# Patient Record
Sex: FEMALE | Race: WHITE | Employment: OTHER | URBAN - METROPOLITAN AREA
[De-identification: names, ages, dates, MRNs, and addresses within clinical notes are randomized per-mention and may not be internally consistent; named-entity substitution may affect disease eponyms.]

---

## 2018-09-22 ENCOUNTER — HOSPITAL ENCOUNTER (EMERGENCY)
Facility: CLINIC | Age: 83
Discharge: HOME | End: 2018-09-22
Attending: EMERGENCY MEDICINE | Admitting: EMERGENCY MEDICINE
Payer: COMMERCIAL

## 2018-09-22 VITALS
RESPIRATION RATE: 16 BRPM | SYSTOLIC BLOOD PRESSURE: 141 MMHG | OXYGEN SATURATION: 95 % | TEMPERATURE: 97.6 F | DIASTOLIC BLOOD PRESSURE: 83 MMHG

## 2018-09-22 DIAGNOSIS — I10 ESSENTIAL HYPERTENSION: ICD-10-CM

## 2018-09-22 LAB
ANION GAP SERPL CALCULATED.3IONS-SCNC: 8 MMOL/L (ref 3–14)
BASOPHILS # BLD AUTO: 0.1 10E9/L (ref 0–0.2)
BASOPHILS NFR BLD AUTO: 0.6 %
BUN SERPL-MCNC: 18 MG/DL (ref 7–30)
CALCIUM SERPL-MCNC: 9.2 MG/DL (ref 8.5–10.1)
CHLORIDE SERPL-SCNC: 98 MMOL/L (ref 94–109)
CO2 SERPL-SCNC: 29 MMOL/L (ref 20–32)
CREAT SERPL-MCNC: 0.67 MG/DL (ref 0.52–1.04)
DIFFERENTIAL METHOD BLD: NORMAL
EOSINOPHIL # BLD AUTO: 0.2 10E9/L (ref 0–0.7)
EOSINOPHIL NFR BLD AUTO: 2.5 %
ERYTHROCYTE [DISTWIDTH] IN BLOOD BY AUTOMATED COUNT: 13.4 % (ref 10–15)
GFR SERPL CREATININE-BSD FRML MDRD: 81 ML/MIN/1.7M2
GLUCOSE SERPL-MCNC: 104 MG/DL (ref 70–99)
HCT VFR BLD AUTO: 39.6 % (ref 35–47)
HGB BLD-MCNC: 13.7 G/DL (ref 11.7–15.7)
IMM GRANULOCYTES # BLD: 0 10E9/L (ref 0–0.4)
IMM GRANULOCYTES NFR BLD: 0.2 %
INTERPRETATION ECG - MUSE: NORMAL
LYMPHOCYTES # BLD AUTO: 1.4 10E9/L (ref 0.8–5.3)
LYMPHOCYTES NFR BLD AUTO: 16.7 %
MCH RBC QN AUTO: 32.3 PG (ref 26.5–33)
MCHC RBC AUTO-ENTMCNC: 34.6 G/DL (ref 31.5–36.5)
MCV RBC AUTO: 93 FL (ref 78–100)
MONOCYTES # BLD AUTO: 0.9 10E9/L (ref 0–1.3)
MONOCYTES NFR BLD AUTO: 10.3 %
NEUTROPHILS # BLD AUTO: 6 10E9/L (ref 1.6–8.3)
NEUTROPHILS NFR BLD AUTO: 69.7 %
PLATELET # BLD AUTO: 296 10E9/L (ref 150–450)
POTASSIUM SERPL-SCNC: 3.9 MMOL/L (ref 3.4–5.3)
RBC # BLD AUTO: 4.24 10E12/L (ref 3.8–5.2)
SODIUM SERPL-SCNC: 135 MMOL/L (ref 133–144)
TROPONIN I SERPL-MCNC: <0.015 UG/L (ref 0–0.04)
WBC # BLD AUTO: 8.7 10E9/L (ref 4–11)

## 2018-09-22 PROCEDURE — 96374 THER/PROPH/DIAG INJ IV PUSH: CPT

## 2018-09-22 PROCEDURE — 93005 ELECTROCARDIOGRAM TRACING: CPT

## 2018-09-22 PROCEDURE — 85025 COMPLETE CBC W/AUTO DIFF WBC: CPT | Performed by: EMERGENCY MEDICINE

## 2018-09-22 PROCEDURE — 84484 ASSAY OF TROPONIN QUANT: CPT | Performed by: EMERGENCY MEDICINE

## 2018-09-22 PROCEDURE — 80048 BASIC METABOLIC PNL TOTAL CA: CPT | Performed by: EMERGENCY MEDICINE

## 2018-09-22 PROCEDURE — 25000128 H RX IP 250 OP 636: Performed by: EMERGENCY MEDICINE

## 2018-09-22 PROCEDURE — 99284 EMERGENCY DEPT VISIT MOD MDM: CPT | Mod: 25

## 2018-09-22 RX ORDER — HYDRALAZINE HYDROCHLORIDE 20 MG/ML
20 INJECTION INTRAMUSCULAR; INTRAVENOUS ONCE
Status: COMPLETED | OUTPATIENT
Start: 2018-09-22 | End: 2018-09-22

## 2018-09-22 RX ADMIN — HYDRALAZINE HYDROCHLORIDE 20 MG: 20 INJECTION INTRAMUSCULAR; INTRAVENOUS at 18:58

## 2018-09-22 ASSESSMENT — ENCOUNTER SYMPTOMS
LIGHT-HEADEDNESS: 1
SHORTNESS OF BREATH: 0

## 2018-09-22 NOTE — ED AVS SNAPSHOT
Emergency Department    [address] [place] [address]    Phone:  [phone]    Fax:  [phone]                                       Rhea Abad   MRN: [id number]    Department:   Emergency Department   Date of Visit:  9/22/2018           After Visit Summary Signature Page     I have received my discharge instructions, and my questions have been answered. I have discussed any challenges I see with this plan with the nurse or doctor.    ..........................................................................................................................................  Patient/Patient Representative Signature      ..........................................................................................................................................  Patient Representative Print Name and Relationship to Patient    ..................................................               ................................................  Date                                   Time    ..........................................................................................................................................  Reviewed by Signature/Title    ...................................................              ..............................................  Date                                               Time          22EPIC Rev 08/18

## 2018-09-22 NOTE — ED PROVIDER NOTES
History     Chief Complaint:  Hypertension       HPI   Rhea Abad is a 89 year old female who presents with her daughter to the Emergency Department for evaluation of hypertension. The patient reports that she measured her BP at home, with a systolic pressure greater than 200. Her usual BP is 161/76.  She is currently on a variety of BP medication. She also notes a few 30 second episodes of lightheadedness today but otherwise has been asx. She had a med dose change in Atenol from 100 mg to 200 mg within the past few months.  She denies any chest pain or shortness of breath      Allergies:  Lisinopril    Medications:    atenolol (TENORMIN) 200 MG tablet  BUSPIRONE HCL PO  calcium polycarbophil (FIBERCON) 625 MG tablet  Citalopram Hydrobromide (CELEXA PO)  dimenhyDRINATE (DRAMAMINE) 25 MG tablet  furosemide (LASIX) 20 MG tablet  levothyroxine (LEVOXYL) 100 MCG tablet  LOSARTAN POTASSIUM PO  pantoprazole (PROTONIX) 40 MG enteric coated tablet  senna-docusate (SENOKOT-S;PERICOLACE) 8.6-50 MG per tablet  TUMS E-X 750 MG OR CHEW     Past Medical History:    Chronic or unspecified peptic ulcer, unspecified site, with hemorrhage, without mention of obstruct  Essential hypertension, benign   GLUCOSE INTOLERANCE   Unspecified hypothyroidism   Hypertension   Depression   Prolapse Vaginal wall   Hypotension due to blood loss   Acute upper GI bleed     Past Surgical History:    EGD x2   Hernia repair   HC Blood transfusion   HC Upper GI W/O KUB  Left cataracts extraction   Left breast core needle biopsy     Family History:    DM  Cerebrovascular Disease  Lung Cancer   HTN     Social History:  Marital Status:    The patient was accompanied to the ED by her daughter.  Smoking Status: Never  Smokeless Tobacco: Never  Alcohol Use: Yes-occ. mixed drinks or wine     Review of Systems   Constitutional:        High BP   Respiratory: Negative for shortness of breath.    Cardiovascular: Negative for chest pain.    Neurological: Positive for light-headedness.   All other systems reviewed and are negative.    Physical Exam   First Vitals:  BP: (!) 229/87  Heart Rate: 64  Temp: 97.6  F (36.4  C)  Resp: 16  SpO2: 98 %      Physical Exam  General/Appearance: appears stated age, well-groomed, appears comfortable  Eyes: EOMI, no scleral injection, no icterus  ENT: MMM  Neck: supple, nl ROM, no stiffness  Cardiovascular: RRR, nl S1S2, no m/r/g, 2+ pulses in all 4 extremities, cap refill <2sec  Respiratory: CTAB, good air movement throughout, no wheezes/rhonchi/rales, no increased WOB, no retractions  Back: no lesions  GI: abd soft, non-distended, nttp,  no HSM, no rebound, no guarding, nl BS  MSK: WALDROP, good tone, no bony abnormality  Skin: warm and well-perfused, no rash, no edema, no ecchymosis, nl turgor  Neuro: GCS 15, alert and oriented, no gross focal neuro deficits  Psych: interacts appropriately  Heme: no petechia, no purpura, no active bleeding      Emergency Department Course     ECG:  Indication: Hypertension  Completed at 2016.  Read at 2016.   Sinus rhythm with marked sinus arrhythmia. Otherwise normal ECG    Rate 71 bpm. WY interval 156. QRS duration 86. QT/QTc 428/465. P-R-T axes 55 67 64.     Laboratory:  Laboratory findings were communicated with the patient who voiced understanding of the findings.    CBC: AWNL. (WBC 8.7, HGB 13.7, )    BMP: Glucose 104 (H) o/w WNL (Creatinine 0.67)   Troponin I: < 0.015    Interventions:  1858: Apresoline 20 mg IV     Emergency Department Course:  IV was inserted and blood was drawn for laboratory testing, results above.    Nursing notes and vitals reviewed.  1845: I performed an exam of the patient as documented above.     Findings and plan explained to the Patient. Patient discharged home with instructions regarding supportive care, medications, and reasons to return. The importance of close follow-up was reviewed.     Impression & Plan      Medical Decision Making:  This  patient is a 89-year-old female who presents with hypertension.  She had several episodes of lightheadedness today but otherwise denies vertigo, headache, chest pain, shortness of breath.  She feels well.  Labs, including troponin and creatinine were within normal limits.  EKG shows no evidence of strain.  After 20 of hydralazine her blood pressure improved markedly.  I think she is safe for discharge and of asked her to follow-up with her PCP.  Diagnosis:    ICD-10-CM    1. Essential hypertension I10        Disposition:  discharged to home    Gwen   9/22/2018    EMERGENCY DEPARTMENT      Scribe Disclosure:  I, Gwen Sanchez, am serving as a scribe at 6:45 PM on 9/22/2018 to document services personally performed by Selma Mccormick MD based on my observations and the provider's statements to me.        Selma Mccormick MD  09/22/18 [id number]

## 2018-09-22 NOTE — ED AVS SNAPSHOT
Emergency Department    [address] [facility] [address]    Phone:  [phone]    Fax:  [phone]                                       Rhea Abad   MRN: [id number]    Department:   Emergency Department   Date of Visit:  9/22/2018           Patient Information     Date Of Birth          10/28/1923        Your diagnoses for this visit were:     Essential hypertension        You were seen by Selma Mccormick MD.      Follow-up Information     Schedule an appointment as soon as possible for a visit with Yoan Saldana MD.    Specialty:  Internal Medicine    Contact information:    [facility]  [address] [address] [phone]          Follow up with  Emergency Department.    Specialty:  EMERGENCY MEDICINE    Why:  As needed, If symptoms worsen    Contact information:    [address] [facility] [id number] [phone]        Discharge Instructions         Uncontrolled High Blood Pressure (Established)    Your blood pressure was unusually high today. This can occur if you ve missed doses of your blood pressure medicine. Or it can happen if you are taking other medicines. These include some asthma inhalers, decongestants, diet pills, and street drugs like cocaine and amphetamine.  Other causes include:    Weight gain    More salt in your diet    Smoking    Caffeine  Your blood pressure can also rise if you are emotionally upset or in intense pain. It may go back to normal after a period of rest.  Blood pressure measurements are given as 2 numbers. Systolic blood pressure is the upper number. This is the pressure when the heart contracts. Diastolic blood pressure is the lower number. This is the pressure when the heart relaxes between beats. You will see your blood pressure readings written together. For example, a person with a systolic pressure of 118 and a diastolic pressure of 78 will have 118/78 written in the medical  record. To be high blood pressure, the numbers must be higher when tested over a period of time.  Blood pressure is categorized as normal, elevated, or stage 1 or stage 2 high blood pressure:    Normal blood pressure is systolic of less than 120 and diastolic of less than 80 (120/80)    Elevated blood pressure is systolic of 120 to 129 and diastolic less than 80    Stage 1 high blood pressure is systolic is 130 to 139 or diastolic between 80 to 89    Stage 2 high blood pressure is when systolic is 140 or higher or the diastolic is 90 or higher  Uncontrolled high blood pressure can cause serious health problems. It raises your risk for heart attack, stroke, and heart failure. In general, if you have high blood pressure, keeping your blood pressure below 130/80 mmHg may help prevent these problems. Your healthcare provider may prescribe medicine to help control blood pressure if lifestyle changes are not enough.  Home care  It s important to take steps to lower your blood pressure. If you are taking blood pressure medicine, the guidelines below may help you need less or no medicines in the future.    Start a weight-loss program if you are overweight.    Cut back on the amount of salt in your diet:  ? Avoid high-salt foods like olives, pickles, smoked meats, and salted potato chips.  ? Don t add salt to your food at the table.  ? Use only small amounts of salt when cooking.    Start an exercise program. Talk with your healthcare provider about what exercise program is best for you. It doesn t have to be difficult. Even brisk walking for 20 minutes 3 times a week is a good form of exercise.    Avoid medicines that stimulates the heart. This includes many over-the-counter cold and sinus decongestant pills and sprays, as well as diet pills. Check the warnings about high blood pressure on the label. Before purchasing any over-the-counter medicines or supplements, always ask the pharmacist about the product's potential  interaction with your high blood pressure and your medicines.    Stimulants such as amphetamine or cocaine could be lethal for someone with high blood pressure. Never take these.    Limit how much caffeine you drink. Or switch to noncaffeinated beverages.    Stop smoking. If you are a long-time smoker, this can be hard. Enroll in a stop-smoking program to make it more likely that you will succeed. Talk with your provider about ways to quit.    Learn how to handle stress better. This is an important part of any program to lower blood pressure. Learn ways to relax. These include meditation, yoga, and biofeedback.    If medicines were prescribed, take them exactly as directed. Missing doses may cause your blood pressure to get out of control.    If you miss a dose or doses of your medicines, check with your healthcare provider or pharmacist about what to do.    Consider buying an automatic blood pressure machine. Your provider may recommend a certain type. You can get one of these at most pharmacies. Measure your blood pressure twice a day, in the morning, and in the late afternoon. Keep a written record of your home blood pressure readings and take the record to your medical appointments.  Here are some additional guidelines on home blood pressure monitoring from the American Heart Association.    Don't smoke or drink coffee for 30 minutes    Go to the bathroom before the test.    Relax for 5 minutes before taking the measurement.    Sit correctly. Be sure your back is supported. Don't sit on a couch or soft chair. Uncross your feet and place them flat on the floor. Place your arm on a solid, flat surface like a table with the upper arm at heart level. Make certain the middle of the cuff is directly above the bend of the elbow. Check the monitor's instruction manual for an illustration.    Take multiple readings. When you measure, take 2 or 3 readings one minute apart and record all of the results.    Take your blood  pressure at the same time every day, or as your healthcare provider recommends.    Record the date, time, and blood pressure reading.    Take the record with you to your next appointment. If your blood pressure monitor has a built-in memory, simply take the monitor with you to your next appointment.    Call your provider if you have several high readings. Don't be frightened by a single high reading, but if you get several high readings, check in with your healthcare provider.    Note: When blood pressure reaches a systolic (top number) of 180 or higher or a diastolic (bottom number) of 110 or higher, emergency medical treatment is required. Call your healthcare provider immediately.  Follow-up care  Regular visits to your own healthcare provider for blood pressure and medicine checks are an important part of your care. Make a follow-up appointment as directed. Bring the record of your home blood pressure readings to the appointment.  When to seek medical advice  Call your healthcare provider right away if any of these occur:    Blood pressure reaches a systolic (top number) of 180 or higher or diastolic (bottom number) of 110 or higher, emergency medical treatment is required.    Chest, arm, shoulder, neck, or upper back pain    Shortness of breath    Severe headache    Throbbing or rushing sound in the ears    Nosebleed    Extreme drowsiness, confusion, or fainting    Dizziness or dizziness with spinning sensation (vertigo)    Weakness in an arm or leg or on one side of the face    Trouble speaking or seeing   Date Last Reviewed: 1/1/2017 2000-2017 [employer]. [address] [place] [address]. All rights reserved. This information is not intended as a substitute for professional medical care. Always follow your healthcare professional's instructions.          24 Hour Appointment Hotline       To make an appointment at any [facility], call [phone] ([phone]). If you  don't have a family doctor or clinic, we will help you find one. [facility] clinics are conveniently located to serve the needs of you and your family.             Review of your medicines      Our records show that you are taking the medicines listed below. If these are incorrect, please call your family doctor or clinic.        Dose / Directions Last dose taken    atenolol 100 MG tablet   Commonly known as:  TENORMIN   Dose:  100 mg   Quantity:  90 tablet        Take 1 tablet by mouth daily.   Refills:  3        BUSPIRONE HCL PO   Dose:  7.5 mg        Take 7.5 mg by mouth daily Prescribed TID   Refills:  0        CALCIUM 600 PO   Dose:  1 tablet        Take 1 tablet by mouth daily   Refills:  0        calcium polycarbophil 625 MG tablet   Commonly known as:  FIBERCON   Dose:  2 tablet        Take 2 tablets by mouth daily   Refills:  0        CELEXA PO   Dose:  20 mg        Take 20 mg by mouth daily   Refills:  0        dimenhyDRINATE 25 mg half-tab   Commonly known as:  DRAMAMINE   Dose:  50 mg        Take 50 mg by mouth At Bedtime   Refills:  0        ferrous sulfate 325 (65 Fe) MG tablet   Commonly known as:  IRON   Dose:  325 mg   Quantity:  60 tablet        Take 1 tablet (325 mg) by mouth 2 times daily   Refills:  1        furosemide 20 MG tablet   Commonly known as:  LASIX   Dose:  20 mg   Quantity:  90 tablet        Take 1 tablet (20 mg) by mouth daily Stop taking Lasix for now until you will be seen by your PMD later on this week; if needed- he may resume Lasix.   Refills:  0        levothyroxine 100 MCG tablet   Commonly known as:  LEVOXYL   Dose:  1 tablet   Quantity:  90 tablet        Take 1 tablet by mouth daily.   Refills:  3        LOSARTAN POTASSIUM PO   Dose:  100 mg        Take 100 mg by mouth daily   Refills:  0        MULTIVITAMIN TABS   OR        1 tablet daily   Refills:  0        pantoprazole 40 MG EC tablet   Commonly known as:  PROTONIX   Dose:  40 mg   Quantity:  30 tablet        Take 1  tablet (40 mg) by mouth daily   Refills:  1        senna-docusate 8.6-50 MG per tablet   Commonly known as:  SENOKOT-S;PERICOLACE   Dose:  1-2 tablet   Quantity:  100 tablet        Take 1-2 tablets by mouth 2 times daily as needed (constipation )   Refills:  0        TUMS E-X 750 MG Chew   Generic drug:  calcium carbonate        prn   Refills:  0        VITAMIN C PO   Dose:  500 mg        Take 500 mg by mouth daily   Refills:  0                Procedures and tests performed during your visit     Basic metabolic panel    CBC with platelets differential    EKG 12-lead, tracing only    Troponin I      Orders Needing Specimen Collection     None      Pending Results     No orders found from 9/20/2018 to 9/23/2018.            Pending Culture Results     No orders found from 9/20/2018 to 9/23/2018.            Pending Results Instructions     If you had any lab results that were not finalized at the time of your Discharge, you can call the ED Lab Result RN at [phone]. You will be contacted by this team for any positive Lab results or changes in treatment. The nurses are available 7 days a week from 10A to 6:30P.  You can leave a message 24 hours per day and they will return your call.        Test Results From Your Hospital Stay        9/22/2018  7:22 PM      Component Results     Component Value Ref Range & Units Status    WBC 8.7 4.0 - 11.0 10e9/L Final    RBC Count 4.24 3.8 - 5.2 10e12/L Final    Hemoglobin 13.7 11.7 - 15.7 g/dL Final    Hematocrit 39.6 35.0 - 47.0 % Final    MCV 93 78 - 100 fl Final    MCH 32.3 26.5 - 33.0 pg Final    MCHC 34.6 31.5 - 36.5 g/dL Final    RDW 13.4 10.0 - 15.0 % Final    Platelet Count 296 150 - 450 10e9/L Final    Diff Method Automated Method  Final    % Neutrophils 69.7 % Final    % Lymphocytes 16.7 % Final    % Monocytes 10.3 % Final    % Eosinophils 2.5 % Final    % Basophils 0.6 % Final    % Immature Granulocytes 0.2 % Final    Absolute Neutrophil 6.0 1.6 - 8.3 10e9/L Final     Absolute Lymphocytes 1.4 0.8 - 5.3 10e9/L Final    Absolute Monocytes 0.9 0.0 - 1.3 10e9/L Final    Absolute Eosinophils 0.2 0.0 - 0.7 10e9/L Final    Absolute Basophils 0.1 0.0 - 0.2 10e9/L Final    Abs Immature Granulocytes 0.0 0 - 0.4 10e9/L Final         9/22/2018  7:24 PM      Component Results     Component Value Ref Range & Units Status    Sodium 135 133 - 144 mmol/L Final    Potassium 3.9 3.4 - 5.3 mmol/L Final    Chloride 98 94 - 109 mmol/L Final    Carbon Dioxide 29 20 - 32 mmol/L Final    Anion Gap 8 3 - 14 mmol/L Final    Glucose 104 (H) 70 - 99 mg/dL Final    Urea Nitrogen 18 7 - 30 mg/dL Final    Creatinine 0.67 0.52 - 1.04 mg/dL Final    GFR Estimate 81 >60 mL/min/1.7m2 Final    Non  GFR Calc    GFR Estimate If Black >90 >60 mL/min/1.7m2 Final    African American GFR Calc    Calcium 9.2 8.5 - 10.1 mg/dL Final         9/22/2018  8:26 PM      Component Results     Component Value Ref Range & Units Status    Troponin I ES <0.015 0.000 - 0.045 ug/L Final    The 99th percentile for upper reference range is 0.045 ug/L.  Troponin values   in the range of 0.045 - 0.120 ug/L may be associated with risks of adverse   clinical events.                  Clinical Quality Measure: Blood Pressure Screening     Your blood pressure was checked while you were in the emergency department today. The last reading we obtained was  BP: 141/83 . Please read the guidelines below about what these numbers mean and what you should do about them.  If your systolic blood pressure (the top number) is less than 120 and your diastolic blood pressure (the bottom number) is less than 80, then your blood pressure is normal. There is nothing more that you need to do about it.  If your systolic blood pressure (the top number) is 120-139 or your diastolic blood pressure (the bottom number) is 80-89, your blood pressure may be higher than it should be. You should have your blood pressure rechecked within a year by a primary  "care provider.  If your systolic blood pressure (the top number) is 140 or greater or your diastolic blood pressure (the bottom number) is 90 or greater, you may have high blood pressure. High blood pressure is treatable, but if left untreated over time it can put you at risk for heart attack, stroke, or kidney failure. You should have your blood pressure rechecked by a primary care provider within the next 4 weeks.  If your provider in the emergency department today gave you specific instructions to follow-up with your doctor or provider even sooner than that, you should follow that instruction and not wait for up to 4 weeks for your follow-up visit.        Thank you for choosing [facility]       Thank you for choosing [facility] for your care. Our goal is always to provide you with excellent care. Hearing back from our patients is one way we can continue to improve our services. Please take a few minutes to complete the written survey that you may receive in the mail after you visit with us. Thank you!        [employer] Information     [employer] lets you send messages to your doctor, view your test results, renew your prescriptions, schedule appointments and more. To sign up, go to [email or web address] . Click on \"Log in\" on the left side of the screen, which will take you to the Welcome page. Then click on \"Sign up Now\" on the right side of the page.     You will be asked to enter the access code listed below, as well as some personal information. Please follow the directions to create your username and password.     Your access code is: [email or web address]  Expires: 2018  8:28 PM     Your access code will  in 90 days. If you need help or a new code, please call your [facility] clinic or [phone].        Care EveryWhere ID     This is your Care EveryWhere ID. This could be used by other organizations to access your [facility] medical records  [id number]        Equal Access to Services     AAKASH GAMA: Hadii " ivelisse Gupta, marcelino rizvi, renee khan. So [facility] [phone].    ATENCIÓN: Si habla español, tiene a jain disposición servicios gratuitos de asistencia lingüística. Llame al [phone].    We comply with applicable federal civil rights laws and [place] laws. We do not discriminate on the basis of race, color, national origin, age, disability, sex, sexual orientation, or gender identity.            After Visit Summary       This is your record. Keep this with you and show to your community pharmacist(s) and doctor(s) at your next visit.

## 2018-09-23 NOTE — DISCHARGE INSTRUCTIONS
Uncontrolled High Blood Pressure (Established)    Your blood pressure was unusually high today. This can occur if you ve missed doses of your blood pressure medicine. Or it can happen if you are taking other medicines. These include some asthma inhalers, decongestants, diet pills, and street drugs like cocaine and amphetamine.  Other causes include:    Weight gain    More salt in your diet    Smoking    Caffeine  Your blood pressure can also rise if you are emotionally upset or in intense pain. It may go back to normal after a period of rest.  Blood pressure measurements are given as 2 numbers. Systolic blood pressure is the upper number. This is the pressure when the heart contracts. Diastolic blood pressure is the lower number. This is the pressure when the heart relaxes between beats. You will see your blood pressure readings written together. For example, a person with a systolic pressure of 118 and a diastolic pressure of 78 will have 118/78 written in the medical record. To be high blood pressure, the numbers must be higher when tested over a period of time.  Blood pressure is categorized as normal, elevated, or stage 1 or stage 2 high blood pressure:    Normal blood pressure is systolic of less than 120 and diastolic of less than 80 (120/80)    Elevated blood pressure is systolic of 120 to 129 and diastolic less than 80    Stage 1 high blood pressure is systolic is 130 to 139 or diastolic between 80 to 89    Stage 2 high blood pressure is when systolic is 140 or higher or the diastolic is 90 or higher  Uncontrolled high blood pressure can cause serious health problems. It raises your risk for heart attack, stroke, and heart failure. In general, if you have high blood pressure, keeping your blood pressure below 130/80 mmHg may help prevent these problems. Your healthcare provider may prescribe medicine to help control blood pressure if lifestyle changes are not enough.  Home care  It s important to take  steps to lower your blood pressure. If you are taking blood pressure medicine, the guidelines below may help you need less or no medicines in the future.    Start a weight-loss program if you are overweight.    Cut back on the amount of salt in your diet:  ? Avoid high-salt foods like olives, pickles, smoked meats, and salted potato chips.  ? Don t add salt to your food at the table.  ? Use only small amounts of salt when cooking.    Start an exercise program. Talk with your healthcare provider about what exercise program is best for you. It doesn t have to be difficult. Even brisk walking for 20 minutes 3 times a week is a good form of exercise.    Avoid medicines that stimulates the heart. This includes many over-the-counter cold and sinus decongestant pills and sprays, as well as diet pills. Check the warnings about high blood pressure on the label. Before purchasing any over-the-counter medicines or supplements, always ask the pharmacist about the product's potential interaction with your high blood pressure and your medicines.    Stimulants such as amphetamine or cocaine could be lethal for someone with high blood pressure. Never take these.    Limit how much caffeine you drink. Or switch to noncaffeinated beverages.    Stop smoking. If you are a long-time smoker, this can be hard. Enroll in a stop-smoking program to make it more likely that you will succeed. Talk with your provider about ways to quit.    Learn how to handle stress better. This is an important part of any program to lower blood pressure. Learn ways to relax. These include meditation, yoga, and biofeedback.    If medicines were prescribed, take them exactly as directed. Missing doses may cause your blood pressure to get out of control.    If you miss a dose or doses of your medicines, check with your healthcare provider or pharmacist about what to do.    Consider buying an automatic blood pressure machine. Your provider may recommend a certain  type. You can get one of these at most pharmacies. Measure your blood pressure twice a day, in the morning, and in the late afternoon. Keep a written record of your home blood pressure readings and take the record to your medical appointments.  Here are some additional guidelines on home blood pressure monitoring from the American [employer] Association.    Don't smoke or drink coffee for 30 minutes    Go to the bathroom before the test.    Relax for 5 minutes before taking the measurement.    Sit correctly. Be sure your back is supported. Don't sit on a couch or soft chair. Uncross your feet and place them flat on the floor. Place your arm on a solid, flat surface like a table with the upper arm at heart level. Make certain the middle of the cuff is directly above the bend of the elbow. Check the monitor's instruction manual for an illustration.    Take multiple readings. When you measure, take 2 or 3 readings one minute apart and record all of the results.    Take your blood pressure at the same time every day, or as your healthcare provider recommends.    Record the date, time, and blood pressure reading.    Take the record with you to your next appointment. If your blood pressure monitor has a built-in memory, simply take the monitor with you to your next appointment.    Call your provider if you have several high readings. Don't be frightened by a single high reading, but if you get several high readings, check in with your healthcare provider.    Note: When blood pressure reaches a systolic (top number) of 180 or higher or a diastolic (bottom number) of 110 or higher, emergency medical treatment is required. Call your healthcare provider immediately.  Follow-up care  Regular visits to your own healthcare provider for blood pressure and medicine checks are an important part of your care. Make a follow-up appointment as directed. Bring the record of your home blood pressure readings to the appointment.  When to seek  medical advice  Call your healthcare provider right away if any of these occur:    Blood pressure reaches a systolic (top number) of 180 or higher or diastolic (bottom number) of 110 or higher, emergency medical treatment is required.    Chest, arm, shoulder, neck, or upper back pain    Shortness of breath    Severe headache    Throbbing or rushing sound in the ears    Nosebleed    Extreme drowsiness, confusion, or fainting    Dizziness or dizziness with spinning sensation (vertigo)    Weakness in an arm or leg or on one side of the face    Trouble speaking or seeing   Date Last Reviewed: 1/1/2017 2000-2017 [employer]. [address] [place] [address]. All rights reserved. This information is not intended as a substitute for professional medical care. Always follow your healthcare professional's instructions.

## 2018-09-25 ENCOUNTER — HOSPITAL ENCOUNTER (EMERGENCY)
Facility: CLINIC | Age: 83
Discharge: HOME | End: 2018-09-25
Attending: EMERGENCY MEDICINE | Admitting: EMERGENCY MEDICINE
Payer: COMMERCIAL

## 2018-09-25 VITALS
DIASTOLIC BLOOD PRESSURE: 93 MMHG | RESPIRATION RATE: 18 BRPM | SYSTOLIC BLOOD PRESSURE: 203 MMHG | TEMPERATURE: 97.8 F | HEIGHT: 65 IN | BODY MASS INDEX: 19.49 KG/M2 | WEIGHT: 117 LBS | OXYGEN SATURATION: 95 %

## 2018-09-25 DIAGNOSIS — I10 ESSENTIAL HYPERTENSION: ICD-10-CM

## 2018-09-25 LAB
ANION GAP SERPL CALCULATED.3IONS-SCNC: 7 MMOL/L (ref 3–14)
BASOPHILS # BLD AUTO: 0.1 10E9/L (ref 0–0.2)
BASOPHILS NFR BLD AUTO: 0.7 %
BUN SERPL-MCNC: 18 MG/DL (ref 7–30)
CALCIUM SERPL-MCNC: 9 MG/DL (ref 8.5–10.1)
CHLORIDE SERPL-SCNC: 101 MMOL/L (ref 94–109)
CO2 SERPL-SCNC: 28 MMOL/L (ref 20–32)
CREAT SERPL-MCNC: 0.64 MG/DL (ref 0.52–1.04)
DIFFERENTIAL METHOD BLD: NORMAL
EOSINOPHIL # BLD AUTO: 0.2 10E9/L (ref 0–0.7)
EOSINOPHIL NFR BLD AUTO: 2.1 %
ERYTHROCYTE [DISTWIDTH] IN BLOOD BY AUTOMATED COUNT: 13.1 % (ref 10–15)
GFR SERPL CREATININE-BSD FRML MDRD: 86 ML/MIN/1.7M2
GLUCOSE SERPL-MCNC: 91 MG/DL (ref 70–99)
HCT VFR BLD AUTO: 38.9 % (ref 35–47)
HGB BLD-MCNC: 13.4 G/DL (ref 11.7–15.7)
IMM GRANULOCYTES # BLD: 0 10E9/L (ref 0–0.4)
IMM GRANULOCYTES NFR BLD: 0.1 %
LYMPHOCYTES # BLD AUTO: 1.3 10E9/L (ref 0.8–5.3)
LYMPHOCYTES NFR BLD AUTO: 17.3 %
MCH RBC QN AUTO: 32.3 PG (ref 26.5–33)
MCHC RBC AUTO-ENTMCNC: 34.4 G/DL (ref 31.5–36.5)
MCV RBC AUTO: 94 FL (ref 78–100)
MONOCYTES # BLD AUTO: 0.8 10E9/L (ref 0–1.3)
MONOCYTES NFR BLD AUTO: 10.6 %
NEUTROPHILS # BLD AUTO: 5.2 10E9/L (ref 1.6–8.3)
NEUTROPHILS NFR BLD AUTO: 69.2 %
NRBC # BLD AUTO: 0 10*3/UL
NRBC BLD AUTO-RTO: 0 /100
PLATELET # BLD AUTO: 266 10E9/L (ref 150–450)
POTASSIUM SERPL-SCNC: 4 MMOL/L (ref 3.4–5.3)
RBC # BLD AUTO: 4.15 10E12/L (ref 3.8–5.2)
SODIUM SERPL-SCNC: 136 MMOL/L (ref 133–144)
TROPONIN I SERPL-MCNC: 0.04 UG/L (ref 0–0.04)
WBC # BLD AUTO: 7.6 10E9/L (ref 4–11)

## 2018-09-25 PROCEDURE — 80048 BASIC METABOLIC PNL TOTAL CA: CPT | Performed by: EMERGENCY MEDICINE

## 2018-09-25 PROCEDURE — 25000132 ZZH RX MED GY IP 250 OP 250 PS 637: Performed by: EMERGENCY MEDICINE

## 2018-09-25 PROCEDURE — 85025 COMPLETE CBC W/AUTO DIFF WBC: CPT | Performed by: EMERGENCY MEDICINE

## 2018-09-25 PROCEDURE — 99283 EMERGENCY DEPT VISIT LOW MDM: CPT

## 2018-09-25 PROCEDURE — 84484 ASSAY OF TROPONIN QUANT: CPT | Performed by: EMERGENCY MEDICINE

## 2018-09-25 RX ORDER — HYDRALAZINE HYDROCHLORIDE 25 MG/1
25 TABLET, FILM COATED ORAL ONCE
Status: COMPLETED | OUTPATIENT
Start: 2018-09-25 | End: 2018-09-25

## 2018-09-25 RX ORDER — HYDRALAZINE HYDROCHLORIDE 25 MG/1
25 TABLET, FILM COATED ORAL
Status: DISCONTINUED | OUTPATIENT
Start: 2018-09-25 | End: 2018-09-26 | Stop reason: HOSPADM

## 2018-09-25 RX ORDER — HYDRALAZINE HYDROCHLORIDE 25 MG/1
25 TABLET, FILM COATED ORAL 3 TIMES DAILY
Qty: 90 TABLET | Refills: 1 | Status: SHIPPED | OUTPATIENT
Start: 2018-09-25 | End: 2018-09-25

## 2018-09-25 RX ORDER — HYDRALAZINE HYDROCHLORIDE 25 MG/1
25 TABLET, FILM COATED ORAL 3 TIMES DAILY
Qty: 90 TABLET | Refills: 1 | Status: ON HOLD | OUTPATIENT
Start: 2018-09-25 | End: 2019-11-15

## 2018-09-25 RX ADMIN — HYDRALAZINE HYDROCHLORIDE 25 MG: 25 TABLET ORAL at 20:56

## 2018-09-25 ASSESSMENT — ENCOUNTER SYMPTOMS
CONSTIPATION: 0
DIARRHEA: 0
DIFFICULTY URINATING: 0
NAUSEA: 0
ABDOMINAL PAIN: 0
LIGHT-HEADEDNESS: 1
DYSURIA: 0
VOMITING: 0
HEADACHES: 0
FREQUENCY: 0

## 2018-09-25 NOTE — ED AVS SNAPSHOT
Emergency Department    [address] [facility] [address]    Phone:  [phone]    Fax:  [phone]                                       Rhea Abad   MRN: [id number]    Department:   Emergency Department   Date of Visit:  9/25/2018           Patient Information     Date Of Birth          10/28/1923        Your diagnoses for this visit were:     Essential hypertension        You were seen by Nazario Green MD.      Follow-up Information     Follow up with Yoan Saldana MD In 1 day.    Specialty:  Internal Medicine    Contact information:    [facility]  [address] [address]  [phone]          Follow up with  Emergency Department.    Specialty:  EMERGENCY MEDICINE    Why:  As needed    Contact information:    [address] [facility] [address] [phone]        Discharge Instructions       Discharge Instructions  Hypertension - High Blood Pressure    During you visit to the Emergency Department, your blood pressure was higher than the recommended blood pressure.  This may be related to stress, pain, medication or other temporary conditions. In these cases, your blood pressure may return to normal on its own. If you have a history of high blood pressure, you may need to have your provider adjust your medications. Sometimes, your high measurement here may indicate that you have developed high blood pressure that will stay high unless it is treated. As a general rule, high blood pressure causes problems over years rather than days, weeks, or months. So, while it is important to treat blood pressure, it is rarely important to treat blood pressure immediately. Occasionally we will begin a medication in the Emergency Department; more often we will recommend close follow-up for medications with a primary doctor/clinic.    Generally, every Emergency Department visit should have a follow-up clinic visit with either a primary or a  specialty clinic/provider. Please follow-up as instructed by your emergency provider today.    Return to the Emergency Department if you start to have:    A severe headache.    Chest pain.    Shortness of breath.    Weakness or numbness that affects one part of the body.    Confusion.    Vision changes.    Significant swelling of legs and/or eyes.    A reaction to any medication started in the Emergency Department.    What can I do to help myself?    Avoid alcohol.    Take any blood pressure medicine that you are prescribed.    Get a good night s sleep.    Lower your salt intake.    Exercise.    Lose weight.    Manage stress.    See your doctor regularly    If blood pressure medication was started in the Emergency Department:    The medicine may not have an immediate effect. The body and brain determine what blood pressure you have. The medicine s job is to retrain the body s  thermostat  to a lower blood pressure.    You will need to follow up with your provider to see how this medicine is working for you.  If you were given a prescription for medicine here today, be sure to read all of the information (including the package insert) that comes with your prescription.  This will include important information about the medicine, its side effects, and any warnings that you need to know about.  The pharmacist who fills the prescription can provide more information and answer questions you may have about the medicine.  If you have questions or concerns that the pharmacist cannot address, please call or return to the Emergency Department.   Remember that you can always come back to the Emergency Department if you are not able to see your regular provider in the amount of time listed above, if you get any new symptoms, or if there is anything that worries you.      24 Hour Appointment Hotline       To make an appointment at any [facility], call [phone] ([phone]). If you don't have a family doctor or  clinic, we will help you find one. [facility] are conveniently located to serve the needs of you and your family.             Review of your medicines      START taking        Dose / Directions Last dose taken    hydrALAZINE 25 MG tablet   Commonly known as:  APRESOLINE   Dose:  25 mg   Quantity:  90 tablet        Take 1 tablet (25 mg) by mouth 3 times daily   Refills:  1          Our records show that you are taking the medicines listed below. If these are incorrect, please call your family doctor or clinic.        Dose / Directions Last dose taken    atenolol 100 MG tablet   Commonly known as:  TENORMIN   Dose:  100 mg   Quantity:  90 tablet        Take 1 tablet by mouth daily.   Refills:  3        BUSPIRONE HCL PO   Dose:  7.5 mg        Take 7.5 mg by mouth daily Prescribed TID   Refills:  0        CALCIUM 600 PO   Dose:  1 tablet        Take 1 tablet by mouth daily   Refills:  0        calcium polycarbophil 625 MG tablet   Commonly known as:  FIBERCON   Dose:  2 tablet        Take 2 tablets by mouth daily   Refills:  0        CELEXA PO   Dose:  20 mg        Take 20 mg by mouth daily   Refills:  0        dimenhyDRINATE 25 mg half-tab   Commonly known as:  DRAMAMINE   Dose:  50 mg        Take 50 mg by mouth At Bedtime   Refills:  0        ferrous sulfate 325 (65 Fe) MG tablet   Commonly known as:  IRON   Dose:  325 mg   Quantity:  60 tablet        Take 1 tablet (325 mg) by mouth 2 times daily   Refills:  1        furosemide 20 MG tablet   Commonly known as:  LASIX   Dose:  20 mg   Quantity:  90 tablet        Take 1 tablet (20 mg) by mouth daily Stop taking Lasix for now until you will be seen by your PMD later on this week; if needed- he may resume Lasix.   Refills:  0        levothyroxine 100 MCG tablet   Commonly known as:  LEVOXYL   Dose:  1 tablet   Quantity:  90 tablet        Take 1 tablet by mouth daily.   Refills:  3        LOSARTAN POTASSIUM PO   Dose:  100 mg        Take 100 mg by mouth daily    Refills:  0        MULTIVITAMIN TABS   OR        1 tablet daily   Refills:  0        pantoprazole 40 MG EC tablet   Commonly known as:  PROTONIX   Dose:  40 mg   Quantity:  30 tablet        Take 1 tablet (40 mg) by mouth daily   Refills:  1        senna-docusate 8.6-50 MG per tablet   Commonly known as:  SENOKOT-S;PERICOLACE   Dose:  1-2 tablet   Quantity:  100 tablet        Take 1-2 tablets by mouth 2 times daily as needed (constipation )   Refills:  0        TUMS E-X 750 MG Chew   Generic drug:  calcium carbonate        prn   Refills:  0        VITAMIN C PO   Dose:  500 mg        Take 500 mg by mouth daily   Refills:  0                Prescriptions were sent or printed at these locations (1 Prescription)                   Other Prescriptions                Printed at Department/Unit printer (1 of 1)         hydrALAZINE (APRESOLINE) 25 MG tablet                Procedures and tests performed during your visit     Basic metabolic panel    CBC with platelets differential    Troponin I      Orders Needing Specimen Collection     None      Pending Results     No orders found from 9/23/2018 to 9/26/2018.            Pending Culture Results     No orders found from 9/23/2018 to 9/26/2018.            Pending Results Instructions     If you had any lab results that were not finalized at the time of your Discharge, you can call the ED Lab Result RN at [phone]. You will be contacted by this team for any positive Lab results or changes in treatment. The nurses are available 7 days a week from 10A to 6:30P.  You can leave a message 24 hours per day and they will return your call.        Test Results From Your Hospital Stay        9/25/2018  8:59 PM      Component Results     Component Value Ref Range & Units Status    Sodium 136 133 - 144 mmol/L Final    Potassium 4.0 3.4 - 5.3 mmol/L Final    Chloride 101 94 - 109 mmol/L Final    Carbon Dioxide 28 20 - 32 mmol/L Final    Anion Gap 7 3 - 14 mmol/L Final    Glucose 91 70 -  99 mg/dL Final    Urea Nitrogen 18 7 - 30 mg/dL Final    Creatinine 0.64 0.52 - 1.04 mg/dL Final    GFR Estimate 86 >60 mL/min/1.7m2 Final    Non  GFR Calc    GFR Estimate If Black >90 >60 mL/min/1.7m2 Final    African American GFR Calc    Calcium 9.0 8.5 - 10.1 mg/dL Final         9/25/2018  8:43 PM      Component Results     Component Value Ref Range & Units Status    WBC 7.6 4.0 - 11.0 10e9/L Final    RBC Count 4.15 3.8 - 5.2 10e12/L Final    Hemoglobin 13.4 11.7 - 15.7 g/dL Final    Hematocrit 38.9 35.0 - 47.0 % Final    MCV 94 78 - 100 fl Final    MCH 32.3 26.5 - 33.0 pg Final    MCHC 34.4 31.5 - 36.5 g/dL Final    RDW 13.1 10.0 - 15.0 % Final    Platelet Count 266 150 - 450 10e9/L Final    Diff Method Automated Method  Final    % Neutrophils 69.2 % Final    % Lymphocytes 17.3 % Final    % Monocytes 10.6 % Final    % Eosinophils 2.1 % Final    % Basophils 0.7 % Final    % Immature Granulocytes 0.1 % Final    Nucleated RBCs 0 0 /100 Final    Absolute Neutrophil 5.2 1.6 - 8.3 10e9/L Final    Absolute Lymphocytes 1.3 0.8 - 5.3 10e9/L Final    Absolute Monocytes 0.8 0.0 - 1.3 10e9/L Final    Absolute Eosinophils 0.2 0.0 - 0.7 10e9/L Final    Absolute Basophils 0.1 0.0 - 0.2 10e9/L Final    Abs Immature Granulocytes 0.0 0 - 0.4 10e9/L Final    Absolute Nucleated RBC 0.0  Final         9/25/2018  9:03 PM      Component Results     Component Value Ref Range & Units Status    Troponin I ES 0.045 0.000 - 0.045 ug/L Final    The 99th percentile for upper reference range is 0.045 ug/L.  Troponin values   in the range of 0.045 - 0.120 ug/L may be associated with risks of adverse   clinical events.                  Clinical Quality Measure: Blood Pressure Screening     Your blood pressure was checked while you were in the emergency department today. The last reading we obtained was  BP: (!) 186/94 . Please read the guidelines below about what these numbers mean and what you should do about them.  If your  "systolic blood pressure (the top number) is less than 120 and your diastolic blood pressure (the bottom number) is less than 80, then your blood pressure is normal. There is nothing more that you need to do about it.  If your systolic blood pressure (the top number) is 120-139 or your diastolic blood pressure (the bottom number) is 80-89, your blood pressure may be higher than it should be. You should have your blood pressure rechecked within a year by a primary care provider.  If your systolic blood pressure (the top number) is 140 or greater or your diastolic blood pressure (the bottom number) is 90 or greater, you may have high blood pressure. High blood pressure is treatable, but if left untreated over time it can put you at risk for heart attack, stroke, or kidney failure. You should have your blood pressure rechecked by a primary care provider within the next 4 weeks.  If your provider in the emergency department today gave you specific instructions to follow-up with your doctor or provider even sooner than that, you should follow that instruction and not wait for up to 4 weeks for your follow-up visit.        Thank you for choosing [facility]       Thank you for choosing [facility] for your care. Our goal is always to provide you with excellent care. Hearing back from our patients is one way we can continue to improve our services. Please take a few minutes to complete the written survey that you may receive in the mail after you visit with us. Thank you!        goOutMap Information     goOutMap lets you send messages to your doctor, view your test results, renew your prescriptions, schedule appointments and more. To sign up, go to [email or web address] . Click on \"Log in\" on the left side of the screen, which will take you to the Welcome page. Then click on \"Sign up Now\" on the right side of the page.     You will be asked to enter the access code listed below, as well as some personal information. Please " follow the directions to create your username and password.     Your access code is: [email or web address]  Expires: 2018  8:28 PM     Your access code will  in 90 days. If you need help or a new code, please call your [facility] clinic or [phone].        Care EveryWhere ID     This is your Care EveryWhere ID. This could be used by other organizations to access your [facility] medical records  [id number]        Equal Access to Services     [facility] CenterLEONID : Hadsofía loyao Sokirill, waaxda luqadaha, qaybta kaalmada adedelfino, renee reece. So [facility] [phone].    ATENCIÓN: Si habla español, tiene a jain disposición servicios gratuitos de asistencia lingüística. Llame al [phone].    We comply with applicable federal civil rights laws and [place] laws. We do not discriminate on the basis of race, color, national origin, age, disability, sex, sexual orientation, or gender identity.            After Visit Summary       This is your record. Keep this with you and show to your community pharmacist(s) and doctor(s) at your next visit.

## 2018-09-25 NOTE — ED AVS SNAPSHOT
Emergency Department    [address] [place] [address]    Phone:  [phone]    Fax:  [phone]                                       Rhea Abad   MRN: [id number]    Department:   Emergency Department   Date of Visit:  9/25/2018           After Visit Summary Signature Page     I have received my discharge instructions, and my questions have been answered. I have discussed any challenges I see with this plan with the nurse or doctor.    ..........................................................................................................................................  Patient/Patient Representative Signature      ..........................................................................................................................................  Patient Representative Print Name and Relationship to Patient    ..................................................               ................................................  Date                                   Time    ..........................................................................................................................................  Reviewed by Signature/Title    ...................................................              ..............................................  Date                                               Time          22EPIC Rev 08/18

## 2018-09-26 NOTE — ED PROVIDER NOTES
History     Chief Complaint:    Hypertension    HPI   Rhea Abad is a 89 year old with a history of hypertension female who presents with hypertension. The patient reports that on 9/22/18 she was seen in the emergency department for hypertension and eventually was discharged after her blood pressure came down with intervention. Today, the patient notes that her blood pressure has gone back up and she has been feeling intermittently lightheaded but is currently asymptomatic. She denies any chest pain, shortness of breath, headache, nausea, vomiting, changes in urination or bowel movements, or abdominal pain. The patient reports that she has not been able to see her primary doctor yesterday or today. She describes that she takes her blood pressure at home and at one point today the machine stopped and said error after a systolic reading of 215. Today, she notes she has had 40 mg of the hydralazine. Of note, the patient describes that her blood pressure will flare up occasionally in the 35 years of taking medications for hypertension.     Allergies:  Lisinopril     Medications:    Tenormin  Fibercon  Celexa  Dramamine  Lasix  Levoxyl  Losartan Potassium  Senokot-S;Pericolace  Hydralazine    Past Medical History:    Peptic ulcer  Hypertension  Glucose intolerance  Hypothyroidism   Constipation  Low back and right leg pain  prolapse of vaginal wall  Acute upper gastrointestinal bleeding    Past Surgical History:    C REMV cataract intracap  Breast biopsy  Esophagoscopy, gastroscopy, dueodenoscopy  HC blood tranfusion  HC Upper GI W/O CATALINA  Hernia repair    Family History:    Mother: diabetes  Father: Cerebrovascular disease  Brother: Cancer, hypertension  Sister: Hypertension  Maternal grandmother: breast cancer    Social History:  The patient was accompanied to the ED by daughter.  Smoking Status: Never Smoker  Smokeless Tobacco: Never Used  Alcohol Use: Yes  Marital Status:        Review of Systems  "  Cardiovascular: Negative for chest pain.   Gastrointestinal: Negative for abdominal pain, constipation, diarrhea, nausea and vomiting.   Genitourinary: Negative for decreased urine volume, difficulty urinating, dysuria, frequency and urgency.   Neurological: Positive for light-headedness. Negative for headaches.   All other systems reviewed and are negative.    Physical Exam     Patient Vitals for the past 24 hrs:   BP Temp Temp src Heart Rate Resp SpO2 Height Weight   09/25/18 2203 (!) 186/94 - - 59 - 96 % - -   09/25/18 2143 120/90 - - 57 - 95 % - -   09/25/18 2056 (!) 203/102 - - - - - - -   09/25/18 2054 (!) 203/102 - - - - - - -   09/25/18 1824 (!) 220/104 97.8  F (36.6  C) Oral 61 18 96 % 1.651 m (5' 5\") 53.1 kg (117 lb)     Physical Exam  Constitutional: Well developed, nontox appearance  Head: Atraumatic.   Mouth/Throat: Oropharynx is clear and moist.   Neck:  no stridor  Eyes: no scleral icterus  Cardiovascular: borderline bradycardia, 2+ bilat radial, DP pulses  Pulmonary/Chest: nml resp effort, Clear BS bilat  Abdominal: ND, +BS, soft, NT, no rebound or guarding   Ext: Warm, well perfused, no edema  Neurological: A&O, symmetric facies, moves ext x4  Skin: Skin is warm and dry.   Psychiatric: Behavior is normal. Thought content normal.   Nursing note and vitals reviewed.        Emergency Department Course   Laboratory:  Laboratory findings were communicated with the patient who voiced understanding of the findings.    CBC: WBC 7.6, HGB 13.4,   BMP: WNL (Creatinine 0.64)  Troponin (Collected 2011): 0.045    Interventions:  2056 Apresoline 25 mg PO    Emergency Department Course:    1824 Nursing notes and vitals reviewed.    2029 I performed an exam of the patient as documented above.     2011 IV was inserted and blood was drawn for laboratory testing, results above.    2211 Recheck and update.    2232 I personally reviewed the lab results with the patient and answered all related questions prior to " discharge.    Impression & Plan      Medical Decision Making:  Rhea Abad is a 89 year old female who presents to the emergency department today for evaluation of Rhea Abad is a 89 year old female who presents for evaluation of elevated blood pressure.  There is positive history of hypertension in the past.  The workup here is negative and the patient does not have any clinical, lab or historical signs of end-organ dysfunction.  There is no signs of hypertensive emergency or urgency.  She is currently asymptomatic.  Supportive outpatient management is therefore indicated with close follow-up of primary care physician.  Given data obtained here in ED, plan to increase the pt's hydralazine to 25 mg TID at this time from 10 mg TID and encouraged serial blood pressure monitoring at home to aid primary in decision making regarding hypertension.  Recommendations given regarding follow up with primary care doctor in 1 day and return to the emergency department as needed for new or worsening symptoms.  Counseled on all results, diagnosis and disposition.  Pt understanding and agreeable to plan. Patient discharged in stable condition.        Diagnosis:    ICD-10-CM    1. Essential hypertension I10      Disposition:   The patient is discharged to home.    Discharge Medications:  New Prescriptions    HYDRALAZINE (APRESOLINE) 25 MG TABLET    Take 1 tablet (25 mg) by mouth 3 times daily     Scribe Disclosure:  I, Orla Severson, am serving as a scribe at 8:29 PM on 9/25/2018 to document services personally performed by Nazario Green MD based on my observations and the provider's statements to me.     EMERGENCY DEPARTMENT       Nazario Green MD  09/25/18 5719

## 2019-10-05 ENCOUNTER — HOSPITAL ENCOUNTER (EMERGENCY)
Facility: CLINIC | Age: 84
Discharge: HOME | End: 2019-10-05
Attending: EMERGENCY MEDICINE | Admitting: EMERGENCY MEDICINE
Payer: COMMERCIAL

## 2019-10-05 ENCOUNTER — APPOINTMENT (OUTPATIENT)
Dept: GENERAL RADIOLOGY | Facility: CLINIC | Age: 84
End: 2019-10-05
Attending: EMERGENCY MEDICINE
Payer: COMMERCIAL

## 2019-10-05 VITALS
SYSTOLIC BLOOD PRESSURE: 161 MMHG | HEIGHT: 64 IN | OXYGEN SATURATION: 97 % | WEIGHT: 107 LBS | TEMPERATURE: 98.4 F | RESPIRATION RATE: 24 BRPM | DIASTOLIC BLOOD PRESSURE: 78 MMHG | BODY MASS INDEX: 18.27 KG/M2 | HEART RATE: 60 BPM

## 2019-10-05 DIAGNOSIS — R05.9 COUGH: ICD-10-CM

## 2019-10-05 DIAGNOSIS — I10 HYPERTENSION, UNSPECIFIED TYPE: ICD-10-CM

## 2019-10-05 LAB
ANION GAP SERPL CALCULATED.3IONS-SCNC: 5 MMOL/L (ref 3–14)
BASOPHILS # BLD AUTO: 0 10E9/L (ref 0–0.2)
BASOPHILS NFR BLD AUTO: 0.5 %
BUN SERPL-MCNC: 13 MG/DL (ref 7–30)
CALCIUM SERPL-MCNC: 8.7 MG/DL (ref 8.5–10.1)
CHLORIDE SERPL-SCNC: 105 MMOL/L (ref 94–109)
CO2 SERPL-SCNC: 26 MMOL/L (ref 20–32)
CREAT SERPL-MCNC: 0.74 MG/DL (ref 0.52–1.04)
D DIMER PPP FEU-MCNC: 0.7 UG/ML FEU (ref 0–0.5)
DIFFERENTIAL METHOD BLD: NORMAL
EOSINOPHIL # BLD AUTO: 0.2 10E9/L (ref 0–0.7)
EOSINOPHIL NFR BLD AUTO: 2.2 %
ERYTHROCYTE [DISTWIDTH] IN BLOOD BY AUTOMATED COUNT: 12.8 % (ref 10–15)
GFR SERPL CREATININE-BSD FRML MDRD: 69 ML/MIN/{1.73_M2}
GLUCOSE SERPL-MCNC: 103 MG/DL (ref 70–99)
HCT VFR BLD AUTO: 38.7 % (ref 35–47)
HGB BLD-MCNC: 13.1 G/DL (ref 11.7–15.7)
IMM GRANULOCYTES # BLD: 0 10E9/L (ref 0–0.4)
IMM GRANULOCYTES NFR BLD: 0.1 %
INTERPRETATION ECG - MUSE: NORMAL
LYMPHOCYTES # BLD AUTO: 2.2 10E9/L (ref 0.8–5.3)
LYMPHOCYTES NFR BLD AUTO: 26.4 %
MCH RBC QN AUTO: 31.8 PG (ref 26.5–33)
MCHC RBC AUTO-ENTMCNC: 33.9 G/DL (ref 31.5–36.5)
MCV RBC AUTO: 94 FL (ref 78–100)
MONOCYTES # BLD AUTO: 0.7 10E9/L (ref 0–1.3)
MONOCYTES NFR BLD AUTO: 8.9 %
NEUTROPHILS # BLD AUTO: 5.2 10E9/L (ref 1.6–8.3)
NEUTROPHILS NFR BLD AUTO: 61.9 %
NRBC # BLD AUTO: 0 10*3/UL
NRBC BLD AUTO-RTO: 0 /100
PLATELET # BLD AUTO: 352 10E9/L (ref 150–450)
POTASSIUM SERPL-SCNC: 4 MMOL/L (ref 3.4–5.3)
RBC # BLD AUTO: 4.12 10E12/L (ref 3.8–5.2)
SODIUM SERPL-SCNC: 136 MMOL/L (ref 133–144)
WBC # BLD AUTO: 8.3 10E9/L (ref 4–11)

## 2019-10-05 PROCEDURE — 99285 EMERGENCY DEPT VISIT HI MDM: CPT | Mod: 25

## 2019-10-05 PROCEDURE — 85379 FIBRIN DEGRADATION QUANT: CPT | Performed by: EMERGENCY MEDICINE

## 2019-10-05 PROCEDURE — 85025 COMPLETE CBC W/AUTO DIFF WBC: CPT | Performed by: EMERGENCY MEDICINE

## 2019-10-05 PROCEDURE — 80048 BASIC METABOLIC PNL TOTAL CA: CPT | Performed by: EMERGENCY MEDICINE

## 2019-10-05 PROCEDURE — 93005 ELECTROCARDIOGRAM TRACING: CPT

## 2019-10-05 PROCEDURE — 71046 X-RAY EXAM CHEST 2 VIEWS: CPT

## 2019-10-05 ASSESSMENT — ENCOUNTER SYMPTOMS
DYSURIA: 0
HEMATURIA: 0
SHORTNESS OF BREATH: 1
UNEXPECTED WEIGHT CHANGE: 0
COUGH: 1
FEVER: 0
LIGHT-HEADEDNESS: 0
CHILLS: 0
PALPITATIONS: 1
DIZZINESS: 0
BLOOD IN STOOL: 0

## 2019-10-05 ASSESSMENT — MIFFLIN-ST. JEOR: SCORE: 865.35

## 2019-10-05 NOTE — ED AVS SNAPSHOT
Emergency Department  [address] [place] [id number]  Phone:  [phone]  Fax:  [phone]                                    Rhea Abad   MRN: [id number]    Department:   Emergency Department   Date of Visit:  10/5/2019           After Visit Summary Signature Page    I have received my discharge instructions, and my questions have been answered. I have discussed any challenges I see with this plan with the nurse or doctor.    ..........................................................................................................................................  Patient/Patient Representative Signature      ..........................................................................................................................................  Patient Representative Print Name and Relationship to Patient    ..................................................               ................................................  Date                                   Time    ..........................................................................................................................................  Reviewed by Signature/Title    ...................................................              ..............................................  Date                                               Time          22EPIC Rev 08/18

## 2019-10-05 NOTE — ED PROVIDER NOTES
"  History     Chief Complaint:  Cough and Generalized Weakness    HPI   Rhea Abad is a 89 year old female with a history of hypertension who presents with her daughter for the evaluation of cough and generalized weakness. The patient reports that over the past two weeks she has been experiencing a slightly productive cough and that today just prior to her arrival she experienced an episode of shortness of breath, heart palpitations, and hypertension with a blood pressure in the 200swhere she \"could not think straight,\" prompting her to the ED. The patient notes that over the past few weeks her blood pressure has been normal and that she has not had any recent changes to her medications. She states that over the past month she has also been experiencing generalized weakness and increased fatigue. She also notes that she has been experiencing rhinorrhea over the past few days. The patient denies hematemesis, chest pain, lightheadedness, dizziness, blood in her stool, dysuria, hematuria, leg swelling, fever, chills, unexpected weight changes, and other issues.      Allergies:  Lisinopril      Medications:    Tenormin  Buspirone  Celexa  Dramamine  Lasix  Apresoline  Levoxyl  Losartan  Protonix  Senokot     Past Medical History:    Peptic ulcer  Hypertension  Glucose intolerance  Hypothyroidism  Hypotension  GI Bleed  Prolapse of vaginal wall  Depression  Constipation     Past Surgical History:    History reviewed. No pertinent surgical history.     Family History:    Diabetes  Cerebrovascular disease  Cancer    Social History:  Smoking status: Never Smoker  Alcohol use: Yes  Drug use: No   Marital Status:   [5]   Accompanied to the ED by daughter.     Review of Systems   Constitutional: Negative for chills, fever and unexpected weight change.   Respiratory: Positive for cough and shortness of breath.    Cardiovascular: Positive for palpitations. Negative for chest pain and leg swelling. " "  Gastrointestinal: Negative for blood in stool.   Genitourinary: Negative for dysuria and hematuria.   Neurological: Negative for dizziness and light-headedness.   All other systems reviewed and are negative.    Physical Exam     Patient Vitals for the past 24 hrs:   BP Temp Temp src Pulse Heart Rate Resp SpO2 Height Weight   10/05/19 1930 (!) 161/78 -- -- 60 -- 24 97 % -- --   10/05/19 1910 (!) 163/86 -- -- -- 61 26 98 % -- --   10/05/19 1830 (!) 169/85 -- -- 60 66 22 98 % -- --   10/05/19 1815 (!) 195/97 -- -- -- 68 20 97 % -- --   10/05/19 1757 (!) 185/97 98.4  F (36.9  C) Oral 70 -- 16 97 % 1.626 m (5' 4\") 48.5 kg (107 lb)        Physical Exam  SKIN:  Warm, dry.  HEMATOLOGIC/IMMUNOLOGIC/LYMPHATIC:  No pallor.  No extremity edema.  HENT:  Moist oral mucosa.  No oropharyngeal inflammation.  Normal phonation.  No stridor.  EYES:  Conjunctivae normal.  CARDIOVASCULAR:  Regular rate and rhythm.  No murmur.  RESPIRATORY:  No respiratory distress, breath sounds equal and normal.  GASTROINTESTINAL:  Soft, nontender abdomen.  MUSCULOSKELETAL: Normal body habitus.  NEUROLOGIC:  Alert, conversant.  PSYCHIATRIC: Anxious mood.    Emergency Department Course   ECG (18:11:58):  Rate 61 bpm. WY interval 172. QRS duration 90. QT/QTc 440/442. P-R-T axes 78 57 56. Normal sinus rhythm with sinus arrhythmia. Normal ECG.  Interpreted at 1825 by Tomy Choi MD.     Imaging:  Radiographic findings were communicated with the patient and family who voiced understanding of the findings.   Chest XR, PA and LAT  IMPRESSION:  1. Large hiatal hernia likely containing most of if not the complete  stomach.  2. Hyperaeration and mild pulmonary scarring likely represent COPD. No  definite pneumonia.  3. Chronic appearing degenerative changes of the right shoulder.  As read by Radiology.     Laboratory:  CBC: WNL (WBC 8.3, HGB 13.1, )  BMP: Glucose 103 (H), WNL (Creatinine 0.74)  D dimer quantitative (1814): 0.7 (H)    Emergency " Department Course:  Past medical records, nursing notes, and vitals reviewed.  1800: I performed an exam of the patient and obtained history, as documented above.     IV inserted and blood drawn.     The patient was sent for a chest x ray while in the emergency department, findings above.     1918: I rechecked the patient. Explained findings to patient and daughter.     1932: I rechecked the patient.  Findings and plan explained to the Patient and daughter. Patient and daughter discharged home with instructions regarding supportive care, medications, and reasons to return. The importance of close follow-up was reviewed.        Impression & Plan    Medical Decision Making:  This patient presents with a number of complaints. Well appearing. She has been having cough for sometime although x ray was unrevealing. She has an elevated d dimer, but at an age adjusted d dimer, I consider it negative so I did not think further imaging regarding PE was indicated. Blood pressure was high upon arrival, but did improve somewhat without intervention. I do not think she is having symptoms of end organ damage relative to hypertension. I reassured the patient that testing was fine and she can follow up with PCP regarding blood pressure cuff or other symptoms.     Diagnosis:    ICD-10-CM    1. Hypertension, unspecified type I10    2. Cough R05      Disposition:  discharged to home    Scribe Disclosure:  I, Brandon Pérez, am serving as a scribe at 6:02 PM on 10/5/2019 to document services personally performed by Tomy Choi MD based on my observations and the provider's statements to me.      Brandon Pérez  10/5/2019    EMERGENCY DEPARTMENT       Tomy Choi MD  10/05/19 0602

## 2019-10-05 NOTE — ED TRIAGE NOTES
Cough for 1-2 weeks,  patient now c/o generalized weakness. Patient hypertensive, having trouble controlling with meds.

## 2019-11-15 ENCOUNTER — APPOINTMENT (OUTPATIENT)
Dept: CT IMAGING | Facility: CLINIC | Age: 84
End: 2019-11-15
Attending: EMERGENCY MEDICINE
Payer: COMMERCIAL

## 2019-11-15 ENCOUNTER — APPOINTMENT (OUTPATIENT)
Dept: ULTRASOUND IMAGING | Facility: CLINIC | Age: 84
End: 2019-11-15
Attending: INTERNAL MEDICINE
Payer: COMMERCIAL

## 2019-11-15 ENCOUNTER — APPOINTMENT (OUTPATIENT)
Dept: GENERAL RADIOLOGY | Facility: CLINIC | Age: 84
End: 2019-11-15
Attending: EMERGENCY MEDICINE
Payer: COMMERCIAL

## 2019-11-15 ENCOUNTER — HOSPITAL ENCOUNTER (OUTPATIENT)
Facility: CLINIC | Age: 84
Setting detail: OBSERVATION
Discharge: HOME | End: 2019-11-17
Attending: EMERGENCY MEDICINE | Admitting: INTERNAL MEDICINE
Payer: COMMERCIAL

## 2019-11-15 DIAGNOSIS — G45.9 TIA (TRANSIENT ISCHEMIC ATTACK): ICD-10-CM

## 2019-11-15 DIAGNOSIS — I63.423 CEREBROVASCULAR ACCIDENT (CVA) DUE TO BILATERAL EMBOLISM OF ANTERIOR CEREBRAL ARTERIES (H): Primary | ICD-10-CM

## 2019-11-15 LAB
ANION GAP SERPL CALCULATED.3IONS-SCNC: <1 MMOL/L (ref 3–14)
BASOPHILS # BLD AUTO: 0 10E9/L (ref 0–0.2)
BASOPHILS NFR BLD AUTO: 0.3 %
BUN SERPL-MCNC: 25 MG/DL (ref 7–30)
CALCIUM SERPL-MCNC: 8.9 MG/DL (ref 8.5–10.1)
CHLORIDE SERPL-SCNC: 104 MMOL/L (ref 94–109)
CO2 SERPL-SCNC: 33 MMOL/L (ref 20–32)
CREAT SERPL-MCNC: 0.82 MG/DL (ref 0.52–1.04)
DIFFERENTIAL METHOD BLD: NORMAL
EOSINOPHIL # BLD AUTO: 0.2 10E9/L (ref 0–0.7)
EOSINOPHIL NFR BLD AUTO: 2.2 %
ERYTHROCYTE [DISTWIDTH] IN BLOOD BY AUTOMATED COUNT: 12.9 % (ref 10–15)
GFR SERPL CREATININE-BSD FRML MDRD: 61 ML/MIN/{1.73_M2}
GLUCOSE SERPL-MCNC: 125 MG/DL (ref 70–99)
HCT VFR BLD AUTO: 38.9 % (ref 35–47)
HGB BLD-MCNC: 12.9 G/DL (ref 11.7–15.7)
IMM GRANULOCYTES # BLD: 0 10E9/L (ref 0–0.4)
IMM GRANULOCYTES NFR BLD: 0.1 %
LYMPHOCYTES # BLD AUTO: 1.1 10E9/L (ref 0.8–5.3)
LYMPHOCYTES NFR BLD AUTO: 16.2 %
MCH RBC QN AUTO: 32.1 PG (ref 26.5–33)
MCHC RBC AUTO-ENTMCNC: 33.2 G/DL (ref 31.5–36.5)
MCV RBC AUTO: 97 FL (ref 78–100)
MONOCYTES # BLD AUTO: 0.7 10E9/L (ref 0–1.3)
MONOCYTES NFR BLD AUTO: 10.8 %
NEUTROPHILS # BLD AUTO: 4.8 10E9/L (ref 1.6–8.3)
NEUTROPHILS NFR BLD AUTO: 70.4 %
NRBC # BLD AUTO: 0 10*3/UL
NRBC BLD AUTO-RTO: 0 /100
PLATELET # BLD AUTO: 282 10E9/L (ref 150–450)
POTASSIUM SERPL-SCNC: 3.9 MMOL/L (ref 3.4–5.3)
RBC # BLD AUTO: 4.02 10E12/L (ref 3.8–5.2)
SODIUM SERPL-SCNC: 137 MMOL/L (ref 133–144)
WBC # BLD AUTO: 6.8 10E9/L (ref 4–11)

## 2019-11-15 PROCEDURE — 71046 X-RAY EXAM CHEST 2 VIEWS: CPT

## 2019-11-15 PROCEDURE — 25000132 ZZH RX MED GY IP 250 OP 250 PS 637: Performed by: EMERGENCY MEDICINE

## 2019-11-15 PROCEDURE — 25000125 ZZHC RX 250: Performed by: EMERGENCY MEDICINE

## 2019-11-15 PROCEDURE — 25800030 ZZH RX IP 258 OP 636: Performed by: EMERGENCY MEDICINE

## 2019-11-15 PROCEDURE — G0378 HOSPITAL OBSERVATION PER HR: HCPCS

## 2019-11-15 PROCEDURE — 25000132 ZZH RX MED GY IP 250 OP 250 PS 637: Performed by: INTERNAL MEDICINE

## 2019-11-15 PROCEDURE — 70498 CT ANGIOGRAPHY NECK: CPT

## 2019-11-15 PROCEDURE — 25000128 H RX IP 250 OP 636: Performed by: EMERGENCY MEDICINE

## 2019-11-15 PROCEDURE — 83036 HEMOGLOBIN GLYCOSYLATED A1C: CPT | Performed by: EMERGENCY MEDICINE

## 2019-11-15 PROCEDURE — 93880 EXTRACRANIAL BILAT STUDY: CPT

## 2019-11-15 PROCEDURE — 99285 EMERGENCY DEPT VISIT HI MDM: CPT | Mod: 25

## 2019-11-15 PROCEDURE — 85025 COMPLETE CBC W/AUTO DIFF WBC: CPT | Performed by: EMERGENCY MEDICINE

## 2019-11-15 PROCEDURE — 93005 ELECTROCARDIOGRAM TRACING: CPT

## 2019-11-15 PROCEDURE — 80048 BASIC METABOLIC PNL TOTAL CA: CPT | Performed by: EMERGENCY MEDICINE

## 2019-11-15 PROCEDURE — 96360 HYDRATION IV INFUSION INIT: CPT | Mod: 59

## 2019-11-15 PROCEDURE — 70450 CT HEAD/BRAIN W/O DYE: CPT | Mod: 59

## 2019-11-15 PROCEDURE — 99203 OFFICE O/P NEW LOW 30 MIN: CPT | Mod: GC | Performed by: PSYCHIATRY & NEUROLOGY

## 2019-11-15 PROCEDURE — 99220 ZZC INITIAL OBSERVATION CARE,LEVL III: CPT | Performed by: INTERNAL MEDICINE

## 2019-11-15 RX ORDER — CALCIUM CARBONATE 500 MG/1
1-2 TABLET, CHEWABLE ORAL DAILY PRN
COMMUNITY

## 2019-11-15 RX ORDER — ATENOLOL 50 MG/1
100 TABLET ORAL 2 TIMES DAILY
Status: ON HOLD | COMMUNITY
End: 2021-01-09

## 2019-11-15 RX ORDER — ACETAMINOPHEN 650 MG/1
650 SUPPOSITORY RECTAL EVERY 4 HOURS PRN
Status: DISCONTINUED | OUTPATIENT
Start: 2019-11-15 | End: 2019-11-17 | Stop reason: HOSPADM

## 2019-11-15 RX ORDER — ACETAMINOPHEN 325 MG/1
650 TABLET ORAL EVERY 4 HOURS PRN
Status: DISCONTINUED | OUTPATIENT
Start: 2019-11-15 | End: 2019-11-17 | Stop reason: HOSPADM

## 2019-11-15 RX ORDER — ONDANSETRON 2 MG/ML
4 INJECTION INTRAMUSCULAR; INTRAVENOUS EVERY 6 HOURS PRN
Status: DISCONTINUED | OUTPATIENT
Start: 2019-11-15 | End: 2019-11-17 | Stop reason: HOSPADM

## 2019-11-15 RX ORDER — AMOXICILLIN 250 MG
1 CAPSULE ORAL DAILY PRN
COMMUNITY

## 2019-11-15 RX ORDER — HYDRALAZINE HYDROCHLORIDE 20 MG/ML
10 INJECTION INTRAMUSCULAR; INTRAVENOUS EVERY 4 HOURS PRN
Status: DISCONTINUED | OUTPATIENT
Start: 2019-11-15 | End: 2019-11-17 | Stop reason: HOSPADM

## 2019-11-15 RX ORDER — NALOXONE HYDROCHLORIDE 0.4 MG/ML
.1-.4 INJECTION, SOLUTION INTRAMUSCULAR; INTRAVENOUS; SUBCUTANEOUS
Status: DISCONTINUED | OUTPATIENT
Start: 2019-11-15 | End: 2019-11-17 | Stop reason: HOSPADM

## 2019-11-15 RX ORDER — CLOPIDOGREL BISULFATE 75 MG/1
75 TABLET ORAL DAILY
Status: DISCONTINUED | OUTPATIENT
Start: 2019-11-16 | End: 2019-11-17 | Stop reason: HOSPADM

## 2019-11-15 RX ORDER — CITALOPRAM HYDROBROMIDE 20 MG/1
20 TABLET ORAL DAILY
Status: DISCONTINUED | OUTPATIENT
Start: 2019-11-16 | End: 2019-11-17 | Stop reason: HOSPADM

## 2019-11-15 RX ORDER — LORAZEPAM 0.5 MG/1
0.5 TABLET ORAL 2 TIMES DAILY PRN
Status: DISCONTINUED | OUTPATIENT
Start: 2019-11-15 | End: 2019-11-17 | Stop reason: HOSPADM

## 2019-11-15 RX ORDER — ONDANSETRON 4 MG/1
4 TABLET, ORALLY DISINTEGRATING ORAL EVERY 6 HOURS PRN
Status: DISCONTINUED | OUTPATIENT
Start: 2019-11-15 | End: 2019-11-17 | Stop reason: HOSPADM

## 2019-11-15 RX ORDER — HYDRALAZINE HYDROCHLORIDE 25 MG/1
25 TABLET, FILM COATED ORAL 3 TIMES DAILY
Status: DISCONTINUED | OUTPATIENT
Start: 2019-11-15 | End: 2019-11-17 | Stop reason: HOSPADM

## 2019-11-15 RX ORDER — PANTOPRAZOLE SODIUM 40 MG/1
40 TABLET, DELAYED RELEASE ORAL DAILY
Status: DISCONTINUED | OUTPATIENT
Start: 2019-11-16 | End: 2019-11-17 | Stop reason: HOSPADM

## 2019-11-15 RX ORDER — LORAZEPAM 0.5 MG/1
0.5 TABLET ORAL 2 TIMES DAILY PRN
COMMUNITY
End: 2021-01-06

## 2019-11-15 RX ORDER — HYDRALAZINE HYDROCHLORIDE 25 MG/1
25 TABLET, FILM COATED ORAL 2 TIMES DAILY
Status: ON HOLD | COMMUNITY
End: 2021-01-09

## 2019-11-15 RX ORDER — IOPAMIDOL 755 MG/ML
120 INJECTION, SOLUTION INTRAVASCULAR ONCE
Status: COMPLETED | OUTPATIENT
Start: 2019-11-15 | End: 2019-11-15

## 2019-11-15 RX ORDER — LOSARTAN POTASSIUM 100 MG/1
100 TABLET ORAL DAILY
Status: DISCONTINUED | OUTPATIENT
Start: 2019-11-16 | End: 2019-11-17 | Stop reason: HOSPADM

## 2019-11-15 RX ORDER — CLOPIDOGREL 300 MG/1
300 TABLET, FILM COATED ORAL ONCE
Status: COMPLETED | OUTPATIENT
Start: 2019-11-15 | End: 2019-11-15

## 2019-11-15 RX ORDER — LEVOTHYROXINE SODIUM 100 UG/1
100 TABLET ORAL
Status: DISCONTINUED | OUTPATIENT
Start: 2019-11-16 | End: 2019-11-17 | Stop reason: HOSPADM

## 2019-11-15 RX ORDER — FUROSEMIDE 20 MG
20 TABLET ORAL EVERY MORNING
Status: ON HOLD | COMMUNITY
End: 2021-01-09

## 2019-11-15 RX ORDER — ASPIRIN 325 MG
325 TABLET ORAL ONCE
Status: COMPLETED | OUTPATIENT
Start: 2019-11-15 | End: 2019-11-15

## 2019-11-15 RX ORDER — ATENOLOL 50 MG/1
100 TABLET ORAL DAILY
Status: DISCONTINUED | OUTPATIENT
Start: 2019-11-16 | End: 2019-11-17 | Stop reason: HOSPADM

## 2019-11-15 RX ADMIN — SODIUM CHLORIDE 100 ML: 9 INJECTION, SOLUTION INTRAVENOUS at 17:35

## 2019-11-15 RX ADMIN — Medication 1 LOZENGE: at 21:15

## 2019-11-15 RX ADMIN — IOPAMIDOL 120 ML: 755 INJECTION, SOLUTION INTRAVENOUS at 17:35

## 2019-11-15 RX ADMIN — SODIUM CHLORIDE 1000 ML: 9 INJECTION, SOLUTION INTRAVENOUS at 18:16

## 2019-11-15 RX ADMIN — HYDRALAZINE HYDROCHLORIDE 25 MG: 25 TABLET ORAL at 21:15

## 2019-11-15 RX ADMIN — Medication 1 MG: at 21:57

## 2019-11-15 RX ADMIN — ASPIRIN 325 MG ORAL TABLET 325 MG: 325 PILL ORAL at 18:20

## 2019-11-15 RX ADMIN — CLOPIDOGREL BISULFATE 300 MG: 300 TABLET, FILM COATED ORAL at 18:20

## 2019-11-15 ASSESSMENT — ENCOUNTER SYMPTOMS
COUGH: 1
WEAKNESS: 1

## 2019-11-15 ASSESSMENT — MIFFLIN-ST. JEOR: SCORE: 846.74

## 2019-11-15 NOTE — ED TRIAGE NOTES
Patients BP was high today when EMS was called to [facility] when friends and family witnessed her at the table at [facility] Slurred speech/left arm weakness, EMS witnessed high BP but no stroke S/S.  She declined the first EMS call to report tot the hospital for further workup.  Family called EMS again when she got home due to HTN and she had her legs buckle 3-4 times.  She took atenolol 50mg and hydralazine 25mg about an hour ago on top of her normal AM medications.

## 2019-11-15 NOTE — ED PROVIDER NOTES
History     Chief Complaint:  Hypertension    HPI   Rhea Abad is a 89 year old female with a history of hypertension and hypothyroidisn who presents to the emergency department for evaluation of hypertension. The patient was recently seen on 10/05 for a cough and generalized weakness. She was diagnosed with a cough and hypertension and discharged to home. Today the patient reports that she was eating at [employer] today around 1300 with her friends and family. Her family noted that the patient seemed to not be paying attention and they noted that she had slurring of her speech and left arm weakness. The patient states that she did not find the conversation interesting and that explained her inattention. The family called EMS who did not find any evidence of stroke, but did note that her blood pressure was high. Later in the day the patient retook her blood pressure and it was 222/80. She then took 25 mg of hydralazine and 50 mg of atenolol in addition to her normal medications. The patient also reportedly had another episode of a glazed look and potential left sided weakness. EMS was called again due to the patient's continued high blood pressure and reoccurring symptoms. The patient did note that today she has had 4 episodes of her knees buckling. The patient had 3 episodes of this in the morning and an episode later in the day at [employer]. She states having a Baker's cyst on her right knee that has been more painful than normal. The patient was able to catch herself each episode and did not fall. The patient mentions not feeling safe walking recently. She does have some leg swelling. She does mention having a cough and congestion that she was seen on 10/05 for. The daughter states that the patient had a glazed look and was not responding normally to direct questions. She also mentions that the daughter was asked to raise both of her arms, but only raised one. This was concerning enough to her to call EMS.  "The patient denies any weakness.    Allergies:  Lisinopril    Medications:    Tenormin  Buspirone  Fibercon  Celexa  Dramamine  Iron  Lasix  Apresoline  Levoxyl  Losartan  Protonix  Senokot  Tums     Past Medical History:    Chronic ulcer  Hypertension  Glucose intolerance  Hypothyroidism  Chronic otitis media  Depression    Past Surgical History:    Cataract extraction  Cataract surgery  Left breast biopsy  Thyroidectomy  EGD x2  Blood transfusion  Hernia repair x2    Family History:    Diabetes  Cerebrovascular disease  Cancer  Hypertension    Social History:  The patient was accompanied to the ED by EMS and her daughter.  Smoking Status: Never  Smokeless Tobacco: Never  Alcohol Use: Yes  Drug Use: No  Marital Status:        Review of Systems   HENT: Positive for congestion.    Respiratory: Positive for cough.    Cardiovascular: Positive for leg swelling.   Neurological: Positive for weakness.   All other systems reviewed and are negative.      Physical Exam   Vitals:  Patient Vitals for the past 24 hrs:   BP Temp Pulse Heart Rate Resp SpO2 Height Weight   11/15/19 1630 (!) 177/79 -- 60 -- -- -- -- --   11/15/19 1615 (!) 183/83 -- 61 -- -- 97 % -- --   11/15/19 1612 (!) 183/81 98.5  F (36.9  C) -- 61 18 98 % 1.626 m (5' 4\") 47.2 kg (104 lb)     Physical Exam  Eye:  Pupils are equal, round, and reactive.  Extraocular movements intact.    ENT:  No rhinorrhea.  Moist mucus membranes.  Normal tongue and tonsil.    Cardiac:  Regular rate and rhythm.  No murmurs, gallops, or rubs.    Pulmonary:  Clear to auscultation bilaterally.  No wheezes, rales, or rhonchi.    Abdomen:  Positive bowel sounds.  Abdomen is soft and non-distended, without focal tenderness.    Musculoskeletal:  Normal movement of all extremities without evidence for deficit.    Skin:  Warm and dry without rashes.    Neurological: CN II - XII intact.  5/5 strength in all extremities.  Normal sensation throughout.  Normal finger to nose and heel to " shin.  2+ patellar reflexes.  Normal gait.    Psychiatric:  Normal affect with appropriate interaction with examiner.    Emergency Department Course     ECG:  Indication: Stroke  Completed at 1806.  Read at 1813.   Rate 57 bpm. GA interval 170. QRS duration 86. QT/QTc 438/426. P-R-T axes 51 59 60.  Sinus bradycardia with premature atrial complexes  Otherwise normal ECG.    Imaging:  Radiographic findings were communicated with the patient and family who voiced understanding of the findings.    CT Head WO Contrast (1710)  No acute intracranial abnormality.  Reading per radiology.    XR Chest 2 Views  PA and lateral views of the chest. Lungs are clear. Heart  is normal in size. No effusions are evident. No pneumothorax. Large  hiatal hernia or Bochdalek hernia is again noted.  Reading per radiology.    CT Head WO Contrast (1737)  Pending    CTA Head Neck W Contrast  CTA Head: High-grade stenosis involving the right anterior cerebral  artery A3 segment.   CTA Neck: Severe plaquing at the bilateral carotid bifurcations  without evidence of flow-limiting stenosis.   Reading per radiology.    Laboratory:  CBC: WNL. (WBC 6.8, HGB 12.9, )   BMP: Carbon Dioxide 33 (H) Anion Gap <1 Glucose 125 (H) o/w AWNL (Creatinine 0.82)  UA with micro: Pending    Interventions:  1816 NS, 1 L, IV bolus  1820 Aspirin 325 mg PO  1820 Plavix 300 mg PO    Emergency Department Course:  Nursing notes and vitals reviewed. 1621 I performed an exam of the patient as documented above.     IV inserted. Medicine administered as documented above. Blood drawn. This was sent to the lab for further testing, results above.    The patient provided a urine sample here in the emergency department. This was sent for laboratory testing, findings above.    The patient was sent for a chest XR, neck CTA and head CT x2 while in the emergency department, findings above.     1725 I rechecked and updated the patient. The patient is now showing signs of a  stroke.    1726 I called a code stroke.    1732 I spoke with the Dr. Jennings, stroke fellow, regarding the patient.    1743 I spoke with Dr. Padgett, neuroradiology regarding the patient.    1758 I spoke with Dr. Jennings, stroke fellow, following his examination of the patient.     1803 I spoke with Dr. Sheikh, hospitalist, who agreed to admit the patient.     1806 I spoke with Dr. Padgett, neuroradiology regarding the patient.    1833 I rechecked the patient and discussed the results of her workup thus far.     Findings and plan explained to the Patient and daughter who consents to observation or admission. Discussed the patient with Dr. Sheikh, who will place the patient in a neuro obs bed for further monitoring, evaluation, and treatment.    I personally reviewed the laboratory results with the Patient and daughter and answered all related questions prior to admission.    Impression & Plan      CMS Diagnoses: The patient has stroke symptoms:           ED Stroke specific documentation           NIHSS PDF          Protocol PDF     Patient last known well time: 1300  ED Provider first to bedside at: 1621  CT Results received at: 1808    tPA:   Not given due to Quick resolution of all symptoms , outside the time window.    If treating with tPA: Ensure SBP<185 and DBP<105 prior to treatment with IV tPA.  Administering IV tPA after treatment with IV labetalol, hydralazine, or nicardipine is reasonable once BP control is established.    Endovascular Retrieval:  Not initiated due to absence of proximal vessel occlusion    National Institutes of Health Stroke Scale (Baseline)  Time Performed: Time of arrival      Score    Level of consciousness: (0)   Alert, keenly responsive    LOC questions: (0)   Answers both questions correctly    LOC commands: (0)   Performs both tasks correctly    Best gaze: (0)   Normal    Visual: (0)   No visual loss    Facial palsy: (0)   Normal symmetrical movements    Motor arm (left): (0)   No  "drift    Motor arm (right): (0)   No drift    Motor leg (left): (0)   No drift    Motor leg (right): (0)   No drift    Limb ataxia: (0)   Absent    Sensory: (0)   Normal- no sensory loss    Best language: (0)   Normal- no aphasia    Dysarthria: (0)   Normal    Extinction and inattention: (0)   No abnormality        Total Score:  0        Stroke Mimics were considered (including migraine headache, seizure disorder, hypoglycemia (or hyperglycemia), head or spinal trauma, CNS infection, Toxin ingestion and shock state (e.g. sepsis) .      Medical Decision Making:  This very healthy 89-year-old woman presents to us because of possible strokelike symptoms.  Family noted that she seems slightly weaker than usual today.  When they were out for lunch, she had a spell where she seemed to be \"out of it\" and may be \"listing to the left.\"  The patient denies any of this, noting that she felt completely fine.  Nonetheless, EMS evaluated her after the restaurant and they found no sign of abnormality except for an elevated blood pressure, but the patient refusing to come to the hospital.    Family then watched her at home and she had another spell where she seemed to have significant weakness in the left side of her body that lasted a few seconds, certainly less than a minute.  This in addition to her elevated blood pressure prompted EMS to again be called in the patient to be brought to the hospital.    On my initial assessment, the patient appears clinically very well.  She is alert and able to provide an excellent history.  She denies any focal weakness or numbness.  I performed a head to toe physical exam and found no evidence for a neurologic deficit.  She states that she took an extra tab of her atenolol and hydralazine because of her high blood pressure at home, but otherwise noted that she was asymptomatic to this.  Her blood pressure has come down to its baseline of 170s over 80s.  In the spirit of caution in a " 89-year-old, laboratory investigation was pursued which was normal.  A chest x-ray and a head CT were obtained which were all unremarkable.    I was called to the room when the patient returned from CT.  She now had signs of a dense weakness to the left arm and the left leg, unable to lift them against gravity or move them at all.  She also had a significant left-sided facial droop.  She was able to communicate, but was much slower to respond and had slurring of her speech.  With this sudden and dramatic change, a code stroke was initiated.  The patient was taken back to CT for an angiogram.  However, by the time that she returned to the emergency department, all of her symptoms had again completely resolved and a third neuro exam shows 100% strength in the left arm and left leg with no further facial droop and normal speech.    I spoke with the neuroradiologist and the stroke neurologist.  There appears to be a narrowing of the vessel in her right NAE that may be the culprit lesion.  However, this is too small to intervene on.  Furthermore, she has had waxing and waning symptoms throughout the day and would not be a TPA candidate.  Furthermore, her neurologic exam puts her stroke scale back to 0.  We have elected not to pursue any type of thrombolysis.  However, she was started on aspirin and loaded with Plavix in consultation with neurology.  Plan will be for admission to the neurology floor for frequent neuro checks and further TIA work-up to see if there are any other interventions that will need to be pursued.  The patient's questions are answered and she is comfortable with the plan for admission.      Critical Care time:  was 30 minutes for this patient excluding procedures.    Diagnosis:    ICD-10-CM    1. TIA (transient ischemic attack) G45.9        Disposition:  Admitted to Dr. Sheikh for a neuro floor observation bed      Gee GARDNER, am serving as a scribe on 11/15/2019 at 4:08 PM to personally  document services performed by Trierweiler, Chad A, MD based on my observations and the provider's statements to me.     Gee Easton  11/15/2019    EMERGENCY DEPARTMENT       Trierweiler, Chad A, MD  11/16/19 1897

## 2019-11-15 NOTE — ED NOTES
Bed: ED29  Expected date:   Expected time:   Means of arrival:   Comments:  [facility] [place], [place] [address] - 89F HTN - ETA 5 min

## 2019-11-16 ENCOUNTER — APPOINTMENT (OUTPATIENT)
Dept: OCCUPATIONAL THERAPY | Facility: CLINIC | Age: 84
End: 2019-11-16
Attending: INTERNAL MEDICINE
Payer: COMMERCIAL

## 2019-11-16 ENCOUNTER — APPOINTMENT (OUTPATIENT)
Dept: MRI IMAGING | Facility: CLINIC | Age: 84
End: 2019-11-16
Attending: PHYSICIAN ASSISTANT
Payer: COMMERCIAL

## 2019-11-16 ENCOUNTER — APPOINTMENT (OUTPATIENT)
Dept: PHYSICAL THERAPY | Facility: CLINIC | Age: 84
End: 2019-11-16
Attending: INTERNAL MEDICINE
Payer: COMMERCIAL

## 2019-11-16 ENCOUNTER — APPOINTMENT (OUTPATIENT)
Dept: CARDIOLOGY | Facility: CLINIC | Age: 84
End: 2019-11-16
Attending: INTERNAL MEDICINE
Payer: COMMERCIAL

## 2019-11-16 PROBLEM — I63.423: Status: ACTIVE | Noted: 2019-11-15

## 2019-11-16 LAB
ALBUMIN UR-MCNC: 10 MG/DL
APPEARANCE UR: CLEAR
BILIRUB UR QL STRIP: NEGATIVE
CHOLEST SERPL-MCNC: 155 MG/DL
COLOR UR AUTO: YELLOW
GLUCOSE BLDC GLUCOMTR-MCNC: 81 MG/DL (ref 70–99)
GLUCOSE BLDC GLUCOMTR-MCNC: 95 MG/DL (ref 70–99)
GLUCOSE UR STRIP-MCNC: NEGATIVE MG/DL
HBA1C MFR BLD: 5.3 % (ref 0–5.6)
HDLC SERPL-MCNC: 57 MG/DL
HGB UR QL STRIP: NEGATIVE
INTERPRETATION ECG - MUSE: NORMAL
KETONES UR STRIP-MCNC: NEGATIVE MG/DL
LDLC SERPL CALC-MCNC: 83 MG/DL
LEUKOCYTE ESTERASE UR QL STRIP: ABNORMAL
MUCOUS THREADS #/AREA URNS LPF: PRESENT /LPF
NITRATE UR QL: NEGATIVE
NONHDLC SERPL-MCNC: 98 MG/DL
PH UR STRIP: 6 PH (ref 5–7)
RBC #/AREA URNS AUTO: 154 /HPF (ref 0–2)
SOURCE: ABNORMAL
SP GR UR STRIP: >1.035 (ref 1–1.03)
SQUAMOUS #/AREA URNS AUTO: 20 /HPF (ref 0–1)
TRIGL SERPL-MCNC: 77 MG/DL
TROPONIN I SERPL-MCNC: <0.015 UG/L (ref 0–0.04)
UROBILINOGEN UR STRIP-MCNC: NORMAL MG/DL (ref 0–2)
WBC #/AREA URNS AUTO: 153 /HPF (ref 0–5)

## 2019-11-16 PROCEDURE — 93005 ELECTROCARDIOGRAM TRACING: CPT

## 2019-11-16 PROCEDURE — 87186 SC STD MICRODIL/AGAR DIL: CPT | Performed by: HOSPITALIST

## 2019-11-16 PROCEDURE — 87086 URINE CULTURE/COLONY COUNT: CPT | Performed by: HOSPITALIST

## 2019-11-16 PROCEDURE — 97161 PT EVAL LOW COMPLEX 20 MIN: CPT | Mod: GP

## 2019-11-16 PROCEDURE — 99214 OFFICE O/P EST MOD 30 MIN: CPT | Performed by: PSYCHIATRY & NEUROLOGY

## 2019-11-16 PROCEDURE — 87088 URINE BACTERIA CULTURE: CPT | Performed by: HOSPITALIST

## 2019-11-16 PROCEDURE — 70553 MRI BRAIN STEM W/O & W/DYE: CPT

## 2019-11-16 PROCEDURE — 25000132 ZZH RX MED GY IP 250 OP 250 PS 637: Performed by: INTERNAL MEDICINE

## 2019-11-16 PROCEDURE — 25500064 ZZH RX 255 OP 636: Performed by: INTERNAL MEDICINE

## 2019-11-16 PROCEDURE — 97116 GAIT TRAINING THERAPY: CPT | Mod: GP

## 2019-11-16 PROCEDURE — 84484 ASSAY OF TROPONIN QUANT: CPT | Performed by: INTERNAL MEDICINE

## 2019-11-16 PROCEDURE — 99225 ZZC SUBSEQUENT OBSERVATION CARE,LEVEL II: CPT | Performed by: INTERNAL MEDICINE

## 2019-11-16 PROCEDURE — G0378 HOSPITAL OBSERVATION PER HR: HCPCS

## 2019-11-16 PROCEDURE — 80061 LIPID PANEL: CPT | Performed by: INTERNAL MEDICINE

## 2019-11-16 PROCEDURE — 40000264 ECHOCARDIOGRAM COMPLETE

## 2019-11-16 PROCEDURE — 93306 TTE W/DOPPLER COMPLETE: CPT | Mod: 26 | Performed by: INTERNAL MEDICINE

## 2019-11-16 PROCEDURE — A9585 GADOBUTROL INJECTION: HCPCS | Performed by: INTERNAL MEDICINE

## 2019-11-16 PROCEDURE — 93010 ELECTROCARDIOGRAM REPORT: CPT | Performed by: INTERNAL MEDICINE

## 2019-11-16 PROCEDURE — 97530 THERAPEUTIC ACTIVITIES: CPT | Mod: GO,59

## 2019-11-16 PROCEDURE — 25000132 ZZH RX MED GY IP 250 OP 250 PS 637: Performed by: PHYSICIAN ASSISTANT

## 2019-11-16 PROCEDURE — 97535 SELF CARE MNGMENT TRAINING: CPT | Mod: GO

## 2019-11-16 PROCEDURE — 00000146 ZZHCL STATISTIC GLUCOSE BY METER IP

## 2019-11-16 PROCEDURE — 36415 COLL VENOUS BLD VENIPUNCTURE: CPT | Performed by: INTERNAL MEDICINE

## 2019-11-16 PROCEDURE — 81001 URINALYSIS AUTO W/SCOPE: CPT | Performed by: EMERGENCY MEDICINE

## 2019-11-16 PROCEDURE — 97165 OT EVAL LOW COMPLEX 30 MIN: CPT | Mod: GO

## 2019-11-16 RX ORDER — ASPIRIN 81 MG/1
81 TABLET, CHEWABLE ORAL DAILY
Status: DISCONTINUED | OUTPATIENT
Start: 2019-11-16 | End: 2019-11-17 | Stop reason: HOSPADM

## 2019-11-16 RX ORDER — ATORVASTATIN CALCIUM 10 MG/1
10 TABLET, FILM COATED ORAL EVERY EVENING
Status: DISCONTINUED | OUTPATIENT
Start: 2019-11-16 | End: 2019-11-16

## 2019-11-16 RX ORDER — ATORVASTATIN CALCIUM 10 MG/1
5 TABLET, FILM COATED ORAL EVERY EVENING
Qty: 15 TABLET | Refills: 0 | Status: SHIPPED | OUTPATIENT
Start: 2019-11-16 | End: 2021-01-06

## 2019-11-16 RX ORDER — CLOPIDOGREL BISULFATE 75 MG/1
75 TABLET ORAL DAILY
Qty: 30 TABLET | Refills: 0 | Status: SHIPPED | OUTPATIENT
Start: 2019-11-17 | End: 2021-01-06

## 2019-11-16 RX ORDER — GADOBUTROL 604.72 MG/ML
5 INJECTION INTRAVENOUS ONCE
Status: COMPLETED | OUTPATIENT
Start: 2019-11-16 | End: 2019-11-16

## 2019-11-16 RX ADMIN — GADOBUTROL 5 ML: 604.72 INJECTION INTRAVENOUS at 12:38

## 2019-11-16 RX ADMIN — LORAZEPAM 0.5 MG: 0.5 TABLET ORAL at 11:48

## 2019-11-16 RX ADMIN — LOSARTAN POTASSIUM 100 MG: 100 TABLET, FILM COATED ORAL at 08:16

## 2019-11-16 RX ADMIN — CITALOPRAM HYDROBROMIDE 20 MG: 20 TABLET ORAL at 08:16

## 2019-11-16 RX ADMIN — HYDRALAZINE HYDROCHLORIDE 25 MG: 25 TABLET ORAL at 20:57

## 2019-11-16 RX ADMIN — ATORVASTATIN CALCIUM 5 MG: 10 TABLET, FILM COATED ORAL at 20:57

## 2019-11-16 RX ADMIN — HYDRALAZINE HYDROCHLORIDE 25 MG: 25 TABLET ORAL at 08:16

## 2019-11-16 RX ADMIN — Medication 1 MG: at 21:03

## 2019-11-16 RX ADMIN — HYDRALAZINE HYDROCHLORIDE 25 MG: 25 TABLET ORAL at 16:06

## 2019-11-16 RX ADMIN — PANTOPRAZOLE SODIUM 40 MG: 40 TABLET, DELAYED RELEASE ORAL at 08:16

## 2019-11-16 RX ADMIN — CLOPIDOGREL BISULFATE 75 MG: 75 TABLET ORAL at 08:16

## 2019-11-16 RX ADMIN — ASPIRIN 81 MG 81 MG: 81 TABLET ORAL at 09:54

## 2019-11-16 RX ADMIN — LEVOTHYROXINE SODIUM 100 MCG: 100 TABLET ORAL at 06:56

## 2019-11-16 ASSESSMENT — ACTIVITIES OF DAILY LIVING (ADL): PREVIOUS_RESPONSIBILITIES: MEAL PREP;HOUSEKEEPING;LAUNDRY;SHOPPING;MEDICATION MANAGEMENT;FINANCES

## 2019-11-16 NOTE — PROGRESS NOTES
[facility]      OUTPATIENT PHYSICAL THERAPY EVALUATION  PLAN OF TREATMENT FOR OUTPATIENT REHABILITATION  (COMPLETE FOR INITIAL CLAIMS ONLY)  Patient's Last Name, First Name, M.I.  YOB: 1923  Rhea Abad                        Provider's Name  [facility] Medical Record No.  [id number]                               Onset Date:  11/15/19   Start of Care Date:  11/16/19      Type:     _X_PT   ___OT   ___SLP Medical Diagnosis:  TIA                        PT Diagnosis:  Impaired gait   Visits from SOC:  1   _________________________________________________________________________________  Plan of Treatment/Functional Goals    Planned Interventions:  ,    gait training, transfer training,       Goals: See Physical Therapy Goals on Care Plan in [employer] electronic health record.    Therapy Frequency: Other (see comments)(eval plus one treat)  Predicted Duration of Therapy Intervention: 1 day  _________________________________________________________________________________    I CERTIFY THE NEED FOR THESE SERVICES FURNISHED UNDER        THIS PLAN OF TREATMENT AND WHILE UNDER MY CARE     (Physician co-signature of this document indicates review and certification of the therapy plan).                Certification date from: 11/16/19, Certification date to: 11/16/19    Referring Physician: Karen Sheikh MD            Initial Assessment        See Physical Therapy evaluation dated 11/16/19 in Epic electronic health record.

## 2019-11-16 NOTE — PROGRESS NOTES
[facility]      OUTPATIENT OCCUPATIONAL THERAPY  EVALUATION  PLAN OF TREATMENT FOR OUTPATIENT REHABILITATION  (COMPLETE FOR INITIAL CLAIMS ONLY)  Patient's Last Name, First Name, M.I.  YOB: 1923  Rhea Abad                          Provider's Name  [facility] Medical Record No.  [id number]                               Onset Date:  11/15/19   Start of Care Date:  11/15/19     Type:     ___PT   _X_OT   ___SLP Medical Diagnosis:  TIA workup/possibly recent stroke on CT.                        OT Diagnosis:  Decreased Ind w/I/ADL   Visits from SOC:  1   _________________________________________________________________________________  Plan of Treatment/Functional Goals    Planned Interventions:  ,       Goals: See Occupational Therapy Goals on Care Plan in [email or web address] electronic health record.    Therapy Frequency: Daily  Predicted Duration of Therapy Intervention: Eval and one treat  _________________________________________________________________________________    I CERTIFY THE NEED FOR THESE SERVICES FURNISHED UNDER        THIS PLAN OF TREATMENT AND WHILE UNDER MY CARE     (Physician co-signature of this document indicates review and certification of the therapy plan).                Certification date from: 11/16/19, Certification date to: 11/16/19    Referring Physician: Karen Sheikh MD            Initial Assessment        See Occupational Therapy evaluation dated 11/15/19 in Epic electronic health record.

## 2019-11-16 NOTE — PROGRESS NOTES
"  [facility]    Stroke Progress Note    Interval Events  She feels well today.    Stroke Evaluation summarized:  MRI/Head CT: MRI shows multiple embolic-appearing infarcts in multiple vascular territories: R NAE, L occipital lobe, R subcortical area  Intracranial Vascular Imaging: CTA Head: High-grade stenosis involving the right anterior cerebral  artery A3 segment.   Cervical Carotid and Vertebral Artery Vascular Imaging: CTA Neck: Severe plaquing at the bilateral carotid bifurcations without definite flow-limiting stenosis.  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  We will continue to follow.     India Powell PA-C  Neurology  11/16/2019 3:24 PM  Text Page (8am-5pm)  To page stroke neurology after hours or on a subsequent day, click here: [facility]COM  Choose \"On Call\" tab at top, then search dropdown box for \"Neurology Adult\" & press Enter, look for Neuro ICU/Stroke    ______________________________________________________    Medications     Scheduled Meds    aspirin  81 mg Oral Daily     atenolol  100 mg Oral Daily     atorvastatin  5 mg Oral QPM     citalopram  20 mg Oral Daily     clopidogrel  75 mg Oral or NG Tube Daily     hydrALAZINE  25 mg Oral TID     levothyroxine  100 mcg Oral QAM AC     " losartan  100 mg Oral Daily     pantoprazole  40 mg Oral Daily       Infusion Meds    - MEDICATION INSTRUCTIONS -         PRN Meds  acetaminophen, acetaminophen, sore throat lozenge, hydrALAZINE, LORazepam, - MEDICATION INSTRUCTIONS -, melatonin, naloxone, ondansetron **OR** ondansetron       PHYSICAL EXAMINATION  Temp:  [97.6  F (36.4  C)-98.5  F (36.9  C)] 97.6  F (36.4  C)  Pulse:  [59-61] 59  Heart Rate:  [51-61] 51  Resp:  [18] 18  BP: (121-192)/(56-95) 132/72  SpO2:  [94 %-98 %] 97 %     General:  patient lying in bed without any acute distress    HEENT:  normocephalic/atraumatic  Pulmonary:  no respiratory distress     Neurologic  Mental Status:  alert, oriented x 3, follows commands, speech clear and fluent, naming and repetition normal  Cranial Nerves:  visual fields intact, PERRL, EOMI with normal smooth pursuit, facial sensation intact and symmetric, facial movements symmetric, hearing not formally tested but intact to conversation, palate elevation symmetric and uvula midline, no dysarthria, shoulder shrug strong bilaterally, tongue protrusion midline  Motor:  normal muscle tone and bulk, no abnormal movements, able to move all limbs spontaneously, strength 5/5 throughout upper and lower extremities, no pronator drift  Reflexes:  toes down-going  Sensory:  light touch sensation intact and symmetric throughout upper and lower extremities, no extinction on double simultaneous stimulation   Coordination:  normal finger-to-nose and heel-to-shin bilaterally without dysmetria, rapid alternating movements symmetric  Station/Gait:  deferred     Stroke Scales    NIHSS  Interval baseline (11/15/19 2014)   Interval Comments admissin (11/15/19 2043)   1a. Level of Consciousness 0-->Alert, keenly responsive   1b. LOC Questions 0-->Answers both questions correctly   1c. LOC Commands 0-->Performs both tasks correctly   2.   Best Gaze 0-->Normal   3.   Visual 0-->No visual loss   4.   Facial Palsy 0-->Normal symmetrical  movements   5a. Motor Arm, Left 0-->No drift, limb holds 90 (or 45) degrees for full 10 secs   5b. Motor Arm, Right 0-->No drift, limb holds 90 (or 45) degrees for full 10 secs   6a. Motor Leg, Left 0-->No drift, leg holds 30 degree position for full 5 secs   6b. Motor Leg, right 0-->No drift, leg holds 30 degree position for full 5 secs   7.   Limb Ataxia 0-->Absent   8.   Sensory 0-->Normal, no sensory loss   9.   Best Language 0-->No aphasia, normal   10. Dysarthria 0-->Normal   11. Extinction and Inattention  0-->No abnormality   Total 0 (11/15/19 2043)       Imaging  I personally reviewed all imaging; relevant findings per HPI.     Lab Results Data   CBC  Recent Labs   Lab 11/15/19  1639   WBC 6.8   RBC 4.02   HGB 12.9   HCT 38.9        Basic Metabolic Panel    Recent Labs   Lab 11/15/19  1639      POTASSIUM 3.9   CHLORIDE 104   CO2 33*   BUN 25   CR 0.82   *   GRUPO 8.9     Liver Panel  Recent Labs   Lab Test 03/17/16  2049   PROTTOTAL 6.4*   ALBUMIN 3.1*   BILITOTAL 0.2   ALKPHOS 67   AST 17   ALT 26     INRNo lab results found.   Lipid Profile  Recent Labs   Lab Test 11/16/19  0551   CHOL 155   HDL 57   LDL 83   TRIG 77     A1C  Recent Labs   Lab Test 11/15/19  1639   A1C 5.3     Troponin I  Recent Labs   Lab 11/16/19  0551   TROPI <0.015

## 2019-11-16 NOTE — PHARMACY-ADMISSION MEDICATION HISTORY
Pharmacy Medication History  Admission medication history interview status for the 11/15/2019  admission is complete. See EPIC admission navigator for prior to admission medications     Medication history sources: Patient and Surescripts  Medication history source reliability: Good  Adherence assessment: Good    Significant changes made to the medication list:  - Atenolol prescribed as 100 mg (2 x 50 mg) BID. Patient takes 100 mg AM and checks her blood pressure in the afternoon. Will take 50 mg if SBP <~160, will take 100 mg if SBP >160-180.  - Removed buspirone, ferrous sulfate, and pantoprazole (takes omeprazole) from list.  - Hydralazine ordered 25 mg TID, patient takes 25 mg BID.    Medication reconciliation completed by provider prior to medication history? No    Time spent in this activity: 30 minutes      Prior to Admission medications    Medication Sig Last Dose Taking? Auth Provider   ascorbic acid (VITAMIN C) 500 MG CPCR CR capsule Take 500 mg by mouth daily Takes in the afternoon. 11/14/2019 at afternoon Yes Unknown, Entered By History   atenolol (TENORMIN) 50 MG tablet Take 100 mg by mouth 2 times daily Rx written for 100 mg BID. Patient takes 100 mg (2 x 50 mg) in the morning and then checks blood pressure in the afternoon. If SBP >160-180, will take 100 mg (2 x 50 mg) in the evening. If SBP < ~160 will take 50 mg. 11/15/2019 at Took 100 mg am and 50 mg in the afternoon Yes Unknown, Entered By History   calcium carbonate (CALCIUM CARBONATE) 600 MG tablet Take 600 mg by mouth daily Takes in the afternoon 11/14/2019 at afternoon Yes Unknown, Entered By History   calcium carbonate (TUMS) 500 MG chewable tablet Take 1-2 chew tab by mouth daily as needed for heartburn prn Yes Unknown, Entered By History   calcium polycarbophil (FIBERCON) 625 MG tablet Take 2 tablets by mouth daily In the afternoon 11/14/2019 at afternoon Yes Unknown, Entered By History   citalopram (CELEXA) 20 MG tablet Take 20 mg by mouth  daily  11/15/2019 at am Yes Unknown, Entered By History   dimenhyDRINATE 50 MG CHEW Take 50 mg by mouth At Bedtime 11/14/2019 at hs Yes Unknown, Entered By History   furosemide (LASIX) 20 MG tablet Take 20 mg by mouth every morning 11/15/2019 at am Yes Unknown, Entered By History   hydrALAZINE (APRESOLINE) 25 MG tablet Take 25 mg by mouth 2 times daily Written TID, patient taking BID. 11/15/2019 at am Yes Unknown, Entered By History   levothyroxine (LEVOXYL) 100 MCG tablet Take 1 tablet by mouth daily. 11/15/2019 at am Yes Nazario Strong MD   LORazepam (ATIVAN) 0.5 MG tablet Take 0.5 mg by mouth 2 times daily as needed for anxiety prn Yes Unknown, Entered By History   losartan (COZAAR) 100 MG tablet Take 100 mg by mouth daily  11/15/2019 at am Yes Unknown, Entered By History   Multiple Vitamins-Minerals (ONE-A-DAY WOMENS 50 PLUS PO) Take 1 tablet by mouth daily In the afternoon 11/14/2019 at afternoon Yes Unknown, Entered By History   omeprazole (PRILOSEC) 20 MG DR capsule Take 20 mg by mouth daily 11/15/2019 at am Yes Unknown, Entered By History   senna-docusate (SENOKOT-S/PERICOLACE) 8.6-50 MG tablet Take 1 tablet by mouth daily as needed for constipation prn Yes Unknown, Entered By History   Tetrahydrozoline HCl (VISINE OP) Place 1 drop into both eyes daily as needed prn Yes Unknown, Entered By History

## 2019-11-16 NOTE — PLAN OF CARE
PT: PT orders received, initial eval and treatment completed. Patient lives alone in a house with 2 stairs to enter and main floor living, basement laundry and shower. Previously independent with all ADLs and mobility without a device, does drive to the grocery store and [employer]. Pt presented with L side weakness, near fall, spells. Admitted under OBS status. Work-up pending for possible TIA/CVA.     Discharge Planner PT   Patient plan for discharge: home  Current status: Pt demonstrates independence with bed mobility; requires SBA for transfers and CGA for gait x 10' without a device. Unsteady on her feet. Instructed pt in correct use of a FWW for transfers and gait with pt demonstrating modified independence with transfers and gait x 150' after instruction in safe walker use. Pt in agreement to have a FWW issued and used at home. Pt reports that her daughters can assist with driving and negotiating stairs at home.  Barriers to return to prior living situation: none  Recommendations for discharge: home with daughter assist to negotiate exterior and basement stairs, use of FWW  Rationale for recommendations: Goals met. No further skilled PT needs. Pt will consult with her PCP regarding any OPPT needs. Will sign off       Entered by: Capri Varma 11/16/2019 10:58 AM

## 2019-11-16 NOTE — PROGRESS NOTES
RECEIVING UNIT ED HANDOFF REVIEW    ED Nurse Handoff Report was reviewed by: Gwen Hopson RN on November 15, 2019 at 7:46 PM

## 2019-11-16 NOTE — ED NOTES
Observation Brochure and Video    Patient and family informed of observation status based on provider's order.  Observation brochure was given. Patient/Family stated understanding. Questions answered.  Enid Padilla RN

## 2019-11-16 NOTE — PROGRESS NOTES
11/16/19 0829   Quick Adds   Type of Visit Initial Occupational Therapy Evaluation   Living Environment   Lives With alone   Living Arrangements house   Home Accessibility stairs to enter home;stairs within home   Number of Stairs, Main Entrance 2   Number of Stairs, Within Home, Primary 10   Stair Railings, Within Home, Primary railings on both sides of stairs   Transportation Anticipated family or friend will provide   Living Environment Comment Pt lives in a one story home but has stairs to enter basement for laundry.   Self-Care   Usual Activity Tolerance good   Current Activity Tolerance moderate   Regular Exercise Yes   Activity/Exercise Type other (see comments)  (exercycle for 25 mins/day)   Exercise Amount/Frequency daily   Equipment Currently Used at Home none   Functional Level   Ambulation 0-->independent   Transferring 0-->independent   Toileting 0-->independent   Bathing 0-->independent   Dressing 0-->independent   Eating 0-->independent   Communication 0-->understands/communicates without difficulty   Cognition 0 - no cognition issues reported   Fall history within last six months no   General Information   Onset of Illness/Injury or Date of Surgery - Date 11/15/19   Referring Physician Karen Sheikh MD   Patient/Family Goals Statement Home   Additional Occupational Profile Info/Pertinent History of Current Problem TIA workup/possibly recent stroke on CT.   Precautions/Limitations fall precautions   Cognitive Status Examination   Orientation orientation to person, place and time   Level of Consciousness alert   Follows Commands (Cognition) WNL   Visual Perception   Visual Perception No deficits were identified   Sensory Examination   Sensory Quick Adds No deficits were identified   Pain Assessment   Patient Currently in Pain No   Range of Motion (ROM)   ROM Quick Adds No deficits were identified   Strength   Strength Comments 4/5 in LUE, 4+5 in RUE   Coordination   Upper Extremity  "Coordination Right UE impaired   Coordination Comments Thumb to 4h digit opposition impaired due to trigger finger    Transfer Skill: Bed to Chair/Chair to Bed   Level of [place]: Bed to Chair stand-by assist   Physical Assist/Nonphysical Assist: Bed to Chair supervision   Weight-Bearing Restrictions full weight-bearing   Transfer Skill: Sit to Stand   Level of [place]: Sit/Stand stand-by assist   Physical Assist/Nonphysical Assist: Sit/Stand supervision   Transfer Skill: Toilet Transfer   Level of [place]: Toilet stand-by assist   Physical Assist/Nonphysical Assist: Toilet supervision   Assistive Device grab bars;seat riser   Lower Body Dressing   Level of [place]: Dress Lower Body independent   Toileting   Level of [place]: Toilet independent   Grooming   Level of [place]: Grooming independent   Eating/Self Feeding   Level of [place]: Eating independent   Instrumental Activities of Daily Living (IADL)   Previous Responsibilities meal prep;housekeeping;laundry;shopping;medication management;finances   Activities of Daily Living Analysis   Impairments Contributing to Impaired Activities of Daily Living strength decreased   Clinical Impression   Criteria for Skilled Therapeutic Interventions Met no   OT Diagnosis Decreased Ind w/I/ADL   Influenced by the following impairments L-sided weakness   Assessment of Occupational Performance 1-3 Performance Deficits   Identified Performance Deficits decreased I w/I/ADL   Clinical Decision Making (Complexity) Low complexity   Therapy Frequency Daily   Predicted Duration of Therapy Intervention (days/wks) Eval and one treat   Anticipated Equipment Needs at Discharge bath sponge;shower chair;reacher   Anticipated Discharge Disposition Home with Home Therapy;Home with Assist   Risks and Benefits of Treatment have been explained. Yes   Patient, Family & other staff in agreement with plan of care Yes   [facility] AM-PAC TM \"6 Clicks\"   " "2016, Trustees of [facility], under license to [employer].  All rights reserved.   6 Clicks Short Forms Daily Activity Inpatient Short Form   [facility] AM-PAC  \"6 Clicks\" Daily Activity Inpatient Short Form   1. Putting on and taking off regular lower body clothing? 4 - None   2. Bathing (including washing, rinsing, drying)? 3 - A Little   3. Toileting, which includes using toilet, bedpan or urinal? 4 - None   4. Putting on and taking off regular upper body clothing? 4 - None   5. Taking care of personal grooming such as brushing teeth? 4 - None   6. Eating meals? 4 - None   Daily Activity Raw Score (Score out of 24.Lower scores equate to lower levels of function) 23   Total Evaluation Time   Total Evaluation Time (Minutes) 8     "

## 2019-11-16 NOTE — ED NOTES
"[facility]  ED Nurse Handoff Report    ED Chief complaint: Hypertension      ED Diagnosis:   Final diagnoses:   TIA (transient ischemic attack)       Code Status: Full Code    Allergies:   Allergies   Allergen Reactions     Lisinopril Cough     No Known Drug Allergies        Activity level - Baseline/Home:  Independent  Activity Level - Current:   Independent with SBA, almost fell three-four times today.    Patient's Preferred language: English   Needed?: No    Isolation: No  Infection: Not Applicable  Bariatric?: No    Vital Signs:   Vitals:    11/15/19 1612 11/15/19 1615 11/15/19 1630   BP: (!) 183/81 (!) 183/83 (!) 177/79   Pulse:  61 60   Resp: 18     Temp: 98.5  F (36.9  C)     SpO2: 98% 97%    Weight: 47.2 kg (104 lb)     Height: 1.626 m (5' 4\")         Cardiac Rhythm: ,        Pain level:      Is this patient confused?: No   Does this patient have a guardian?  No         If yes, is there guardianship documents in the Epic \"Code/ACP\" activity?  N/A         Guardian Notified?  N/A  [place] - Suicide Severity Rating Scale Completed?  Yes  If yes, what color did the patient score?  N/A    Patient Report: Initial Complaint: HTN with stroke S/S witnessed at 1230/1728  Focused Assessment: Patient comes in with high blood pressure evaluation with 1230 Stroke signs that resolved at a restaurant per family.  On arrival t the room by EMS 1603, she was not having S/S of stroke. Labs sent. Xray completed an head CT w/o contrast completed and then at 1728, RN/writer was going to take her for a walk to the bathroom but she was unable to use her left arm or leg, and she had a left facial droop. CODE stroke was called for the other half of her CT-profusion.  When she returned to the room her stroke symptoms had resolved.  No cognitive S/S but she was a little hesitant to answer/slower to reponde, and she was unaware of the episode.    Tests Performed: CT/Labs and Xray  Abnormal Results:   Labs " Ordered and Resulted from Time of ED Arrival Up to the Time of Departure from the ED   BASIC METABOLIC PANEL - Abnormal; Notable for the following components:       Result Value    Carbon Dioxide 33 (*)     Anion Gap <1 (*)     Glucose 125 (*)     All other components within normal limits   CBC WITH PLATELETS DIFFERENTIAL   ROUTINE UA WITH MICROSCOPIC   PULSE OXIMETRY NURSING   CARDIAC CONTINUOUS MONITORING   PERIPHERAL IV CATHETER       Treatments provided: Palvix/ASA/bolus    Family Comments: two daughters in the room, supportive and concerned    OBS brochure/video discussed/provided to patient/family: YES              Name of person given brochure if not patient: Daughter and marito herself              Relationship to patient: daughters and patient    ED Medications:   Medications   sodium chloride (PF) 0.9% PF flush 3 mL (has no administration in time range)   sodium chloride (PF) 0.9% PF flush 3 mL (has no administration in time range)   iopamidol (ISOVUE-370) solution 120 mL (120 mLs Intravenous Given 11/15/19 1735)   Saline (100 mLs As instructed Given 11/15/19 1735)   aspirin (ASA) tablet 325 mg (325 mg Oral Given 11/15/19 1820)   clopidogrel (PLAVIX) tablet 300 mg (300 mg Oral Given 11/15/19 1820)   0.9% sodium chloride BOLUS (1,000 mLs Intravenous New Bag 11/15/19 1816)       Drips infusing?:  No    For the majority of the shift this patient was green  Interventions performed were NA.    Severe Sepsis OR Septic Shock Diagnosis Present: No    To be done/followed up on inpatient unit:  NA    ED NURSE PHONE NUMBER: *[phone]

## 2019-11-16 NOTE — CONSULTS
[facility]    Stroke Consult Note    Reason for Consult: stroke code    Chief Complaint: Hypertension      HPI  89 F hx of HTN who p/w stuttering TIAs. The patient was in her normal state of health today when around 1300 she had sudden onset of feeling unwell. It is unclear, but she may have had mild slurred speech and LUE weakness. EMS came to assess her and found she was backa t baseline. She refused to come in. After they left, she measured her BP and it was 222/80 so she took hydralazine and two atenolol tabs. She came in to the ED to be evaluated for HTN. While in the ED at 1725, she abruptly developed dense L hemiplegia including the face. This lasted for roughly 15 minutes or so and after CT/CTA were done she was back to baseline. CTH shows a recent-appearing infarct in the R genu of the internal capsule so tPA was not given, along with resolving symptoms as a reason. She has no prior history of stroke.     ______________________________________________________    Past Medical History   Past Medical History:   Diagnosis Date     Chronic or unspecified peptic ulcer, unspecified site, with hemorrhage, without mention of obstruction 1984     Essential hypertension, benign      GLUCOSE INTOLERANCE 11-01     Unspecified hypothyroidism      Past Surgical History   Past Surgical History:   Procedure Laterality Date     C REMV CATARACT INTRACAP,INSERT LENS  06/01/1989    L cataract extraction     C REMV CATARACT INTRACAP,INSERT LENS  1987,1994    cataract surgeries     C STEREOTACTIC BREAST BIOPSY  01/02/98    left breast ster. core needle biopsy     C THYROIDECTOMY  1966    throidectomy     ESOPHAGOSCOPY, GASTROSCOPY, DUODENOSCOPY (EGD), COMBINED N/A 3/18/2016    Procedure: COMBINED ESOPHAGOSCOPY, GASTROSCOPY, DUODENOSCOPY (EGD);  Surgeon: Tray Davis MD;  Location: [facility]     ESOPHAGOSCOPY, GASTROSCOPY, DUODENOSCOPY (EGD), COMBINED N/A 3/19/2016    Procedure: COMBINED ESOPHAGOSCOPY,  GASTROSCOPY, DUODENOSCOPY (EGD);  Surgeon: Reyna Carrero MD;  Location:  GI     HC BLOOD TRANSFUSION SERVICE  1960's    bleeding ulcer requiring transfusion     HC UPPER GI W/O KUB  6/19/2001    Upper GI- hiatal hernia     HERNIA REPAIR       Medications   Home Meds  Prior to Admission medications    Medication Sig Start Date End Date Taking? Authorizing Provider   ascorbic acid (VITAMIN C) 500 MG CPCR CR capsule Take 500 mg by mouth daily Takes in the afternoon.   Yes Unknown, Entered By History   atenolol (TENORMIN) 50 MG tablet Take 100 mg by mouth 2 times daily Rx written for 100 mg BID. Patient takes 100 mg (2 x 50 mg) in the morning and then checks blood pressure in the afternoon. If SBP >160-180, will take 100 mg (2 x 50 mg) in the evening. If SBP < ~160 will take 50 mg.   Yes Unknown, Entered By History   calcium carbonate (CALCIUM CARBONATE) 600 MG tablet Take 600 mg by mouth daily Takes in the afternoon   Yes Unknown, Entered By History   calcium carbonate (TUMS) 500 MG chewable tablet Take 1-2 chew tab by mouth daily as needed for heartburn   Yes Unknown, Entered By History   calcium polycarbophil (FIBERCON) 625 MG tablet Take 2 tablets by mouth daily In the afternoon   Yes Unknown, Entered By History   citalopram (CELEXA) 20 MG tablet Take 20 mg by mouth daily    Yes Unknown, Entered By History   dimenhyDRINATE 50 MG CHEW Take 50 mg by mouth At Bedtime   Yes Unknown, Entered By History   furosemide (LASIX) 20 MG tablet Take 20 mg by mouth every morning   Yes Unknown, Entered By History   hydrALAZINE (APRESOLINE) 25 MG tablet Take 25 mg by mouth 2 times daily Written TID, patient taking BID.   Yes Unknown, Entered By History   levothyroxine (LEVOXYL) 100 MCG tablet Take 1 tablet by mouth daily. 12/27/11  Yes Nazario Strong MD   LORazepam (ATIVAN) 0.5 MG tablet Take 0.5 mg by mouth 2 times daily as needed for anxiety   Yes Unknown, Entered By History   losartan (COZAAR) 100 MG  tablet Take 100 mg by mouth daily    Yes Unknown, Entered By History   Multiple Vitamins-Minerals (ONE-A-DAY WOMENS 50 PLUS PO) Take 1 tablet by mouth daily In the afternoon   Yes Unknown, Entered By History   omeprazole (PRILOSEC) 20 MG DR capsule Take 20 mg by mouth daily   Yes Unknown, Entered By History   senna-docusate (SENOKOT-S/PERICOLACE) 8.6-50 MG tablet Take 1 tablet by mouth daily as needed for constipation   Yes Unknown, Entered By History   Tetrahydrozoline HCl (VISINE OP) Place 1 drop into both eyes daily as needed   Yes Unknown, Entered By History       Scheduled Meds    atenolol  100 mg Oral Daily     busPIRone  7.5 mg Oral Daily     citalopram  20 mg Oral Daily     clopidogrel  75 mg Oral or NG Tube Daily     hydrALAZINE  25 mg Oral TID     levothyroxine  100 mcg Oral Daily     losartan  100 mg Oral Daily     pantoprazole  40 mg Oral Daily     sodium chloride (PF)  3 mL Intracatheter Q8H       Infusion Meds    - MEDICATION INSTRUCTIONS -         PRN Meds  acetaminophen, acetaminophen, hydrALAZINE, - MEDICATION INSTRUCTIONS -, melatonin, naloxone, ondansetron **OR** ondansetron, sodium chloride (PF)    Allergies   Allergies   Allergen Reactions     Lisinopril Cough     No Known Drug Allergies      Family History   Family History   Problem Relation Age of Onset     Diabetes Mother              Cerebrovascular Disease Father              Cancer Brother          - lung     Hypertension Sister      Hypertension Brother      Breast Cancer Maternal Grandmother         DIABETES MELLITUS     Social History   Social History     Tobacco Use     Smoking status: Never Smoker     Smokeless tobacco: Never Used   Substance Use Topics     Alcohol use: Yes     Comment: occasional wine or mixed drink     Drug use: No       Review of Systems   The 10 point Review of Systems is negative other than noted in the HPI or here.        PHYSICAL EXAMINATION  Temp:  [97.6  F (36.4  C)-98.5  F (36.9  C)]  97.6  F (36.4  C)  Pulse:  [59-61] 59  Heart Rate:  [54-61] 54  Resp:  [18] 18  BP: (177-192)/(74-95) 179/74  SpO2:  [95 %-98 %] 95 %     Mental status: AOX4, speech fluent  Cranial nerves: EOMI, VFF, face symmetric and equal to LT  Motor: 5/5 diffusely  Sensory: equal to LT  Coordination: FTN intact BL  Gait: defer    Dysphagia Screen  Passed screening, no dysarthria - Regular Diet with thin liquids  11/15/2019 1545    Stroke Scales    NIHSS  Interval baseline (11/15/19 2014)   Interval Comments     1a. Level of Consciousness 0-->Alert, keenly responsive   1b. LOC Questions 0-->Answers both questions correctly   1c. LOC Commands 0-->Performs both tasks correctly   2.   Best Gaze 0-->Normal   3.   Visual 0-->No visual loss   4.   Facial Palsy 0-->Normal symmetrical movements   5a. Motor Arm, Left 0-->No drift, limb holds 90 (or 45) degrees for full 10 secs   5b. Motor Arm, Right 0-->No drift, limb holds 90 (or 45) degrees for full 10 secs   6a. Motor Leg, Left 0-->No drift, leg holds 30 degree position for full 5 secs   6b. Motor Leg, right 0-->No drift, leg holds 30 degree position for full 5 secs   7.   Limb Ataxia 0-->Absent   8.   Sensory 0-->Normal, no sensory loss   9.   Best Language 0-->No aphasia, normal   10. Dysarthria 0-->Normal   11. Extinction and Inattention  0-->No abnormality   Total 0 (11/15/19 2014)       Imaging  I personally reviewed all imaging; relevant findings per HPI.     Lab Results CBC  Recent Labs   Lab 11/15/19  1639   WBC 6.8   RBC 4.02   HGB 12.9   HCT 38.9        Basic Metabolic Panel    Recent Labs   Lab 11/15/19  1639      POTASSIUM 3.9   CHLORIDE 104   CO2 33*   BUN 25   CR 0.82   *   GRUPO 8.9     Liver Panel  Recent Labs   Lab Test 03/17/16  2049   PROTTOTAL 6.4*   ALBUMIN 3.1*   BILITOTAL 0.2   ALKPHOS 67   AST 17   ALT 26     INRNo lab results found.   Lipid ProfileNo lab results found.  A1CNo lab results found.  Troponin Arsen results for input(s): TROPI in  "the last 168 hours.    =================================================================    ASSESSMENT/PLAN: 89 F hx of HTN who p/w stuttering TIAs.     ## stuttering TIAs: presenting with recurrent L-sided symptoms. Most likely etiology is a stuttering lacune. Imaging does show a recent infarct in the R internal capsule genu. Whether this infarct is extending into an adjacent area leading to symptoms is unclear. Her pressures are very high as she is pressure-dependent to the ischemic area at risk and probably precipitated worsening of symptoms when she took her BP meds earlier today.   --recommend admission to the floor with stroke consult.  --q2h neuro checks overnight  --aspirin 81mg daily  --loaded with plavix in the ED. Begin plavix 75mg daily starting tomorrow  --goal SBP < 220 for 24h after symptom onset, f/b <180 ongoing  --goal A1c < 7, goal LDL < 70  --PT/OT/SLP  --LDL, A1c  --statin pending LDL  --cardiac monitoring  --TTE  --she does have some ongoing memory deficits. She does not remember any of her episodes of weakness today. Family noticed this multiple times. Unclear how this ties into her clinical presentation currently.  --MRI brain when stable.     =================================================================    Walter Jennings  Vascular Neurology Fellow    11/15/2019 8:20 PM  Text Page (8am-7pm)  To page stroke neurology after hours or on a subsequent day, click here: AMCOM  Choose \"On Call\" tab at top, then search dropdown box for \"Neurology Adult\" & press Enter, look for Neuro ICU/Stroke    Stroke Code / Stroke Consult Data Data   Stroke Code Data  (for stroke code without tele)  Stroke code activated 11/15/19   1730   First stroke provider response 11/15/19   1732   Last known normal 11/15/19   1300   Time of discovery   (or onset of symptoms) 11/15/19   1300   Head CT read by me 11/15/19   2326   Was stroke code de-escalated?                     "

## 2019-11-16 NOTE — DISCHARGE INSTRUCTIONS
Please follow up with neurology in 2-4 weeks. You may call any of the following neurology clinics for an appointment or a clinic of your choice. Tell them you were recently hospitalized and need follow up as recommended at discharge.    1. [facility]   [address], [address]   [place] [address]   [phone]  2. [facility]   [address] [facility], [address]   [place] [address]   [phone]  3. [facility]   Alen Valladares MD    Your risk factors for stroke or TIA (transient ischemic attack):    Your Risk Factors Your Results Normal Ranges   High blood pressure BP Readings from Last 1 Encounters:   11/17/19 (!) 150/82    Less than 120/80   Cholesterol              Total Lab Results   Component Value Date    CHOL 155 11/16/2019      Less than 150    Triglycerides   Lab Results   Component Value Date    TRIG 77 11/16/2019    Less than 150   LDL Lab Results   Component Value Date    LDL 83 11/16/2019       Less than 70   HDL Lab Results   Component Value Date    HDL 57 11/16/2019            Greater than 40 (men)  Greater than 50 (women)   Diabetes Recent Labs   Lab 11/15/19  1639   *    Fasting blood glucose    Smoking/tobacco use  Quit smoking and tobacco   Overweight  Lose 1-2 pounds a week   Lack of exercise  30 minutes moderate activity each day   Other risk factors include carotid (neck) artery disease, atrial fibrillation and stress. You may be on new medicine to treat high blood pressure, cholesterol, diabetes or atrial fibrillation.    Understanding Stroke Booklet given to patient. Please refer to booklet for further information.    Stroke warning signs and symptoms - CALL 911 right away for:  - Sudden numbness or weakness in the face, arm or leg (often on one side of the body).  - Sudden confusion or trouble understanding what is going on.  - Sudden blurred or decreased vision in one or both eyes.  - Sudden trouble speaking,  loss of balance, dizziness or problems with coordination.  - Sudden, severe headache for no reason.  - Fainting or seizures.  - Symptoms may go away then come back suddenly.   [address] [place] [address]   [phone]  4. [facility] - Hadley Valladares MD   [address]   [place], [place] [address] [phone]    AFTER SPEAKING WITH DR. RUANO IT IS OK FOR YOU TO TAKE MELATONIN AT NIGHT TO SLEEP.  START WITH 1MG BUT MAY TAKE AS MUCH 3MG. YOU MAY TAKE THIS WITH DRAMAMINE BUT WITH CAUTION. PLEASE START WITH JUST DRAMAMINE AND ADD MELATONIN as NEEDED.

## 2019-11-16 NOTE — H&P
Admitted:     11/15/2019      PRIMARY CARE PHYSICIAN:  Yoan Saldana MD      CHIEF COMPLAINT:  Spell and left-sided weakness and clumsiness.      HISTORY OF PRESENT ILLNESS:  The history is obtained from discussion with the ER staff as well as the patient herself who is a good historian and her friends at the bedside.  Ms. Abad is a generally active 89-year-old female who lives independently, who has had issues with labile hypertension who presented today after a series of spells.  She reported that earlier today she felt some leg weakness in which her legs would buckle underneath her.  This did not concern her enough to prevent her from going out to visit with her friends for lunch.  During lunch, the patient was noted to have a period of spells which she does not quite remember that lasted for several seconds by description of her friends at the bedside.  Afterwards, she had some clumsiness with her left arm and inability to raise a glass.  This was transient and ultimately the symptoms resolved.  However, the symptoms happened again several minutes later and at the urging by the patient's friend, she came to the emergency room for further evaluation where she was seen by Dr. Trierweiler.  She was noted to be somewhat hypertensive in the emergency room and was also observed to have an aphasic spell that again was self-limited.  This prompted a stroke evaluation.  She underwent CTA which did not reveal evidence of acute stroke, but did reveal high-grade stenosis of the right NAE.  At the time of my interview, the patient is comfortable.  She is alert and oriented x 3.  No current complaints as above.  She typically lives independently could climb a flight of stairs.  She is able to drive.      REVIEW OF SYSTEMS:  Patient denies any recent antecedent illnesses.  No chest pain, shortness of breath.  Otherwise, a 10-point review of systems was conducted and is negative except as noted above in the history of  present illness.      PAST MEDICAL HISTORY:   1.  Labile hypertension.   2.  History of upper gastrointestinal bleed, likely related to Anant erosions.   3.  Acute blood loss anemia secondary to above.   4.  Hypothyroidism.   5.  History of depression.      SOCIAL HISTORY:  The patient lives independently.  Lifetime nonsmoker and very rare alcohol use.      FAMILY HISTORY:  Reviewed and noncontributory.      MEDICATIONS PRIOR TO ADMISSION:   1.  Atenolol.   2.  Buspirone.   3.  Celexa.   4.  P.r.n. Dramamine.   5.  Lasix.   6.  Hydralazine.   7.  Synthroid.   8.  Losartan.   9.  P.r.n. Senna.      PHYSICAL EXAMINATION:   VITAL SIGNS:  Blood pressure ranging from 177/79 to 183/83, heart rate is 61, temperature is 98.5, respirations 18.   GENERAL:  This is a well-nourished, slightly thin-appearing female.  She is cooperative and appropriate, oriented x 3 currently, does have some unusual amnesia related to the events of earlier today.   HEENT:  Pupils are round and reactive.   NEUROLOGIC:  She has equal  strength.  Bilateral downgoing, dorsi and plantarflexion are equal.  Deep tendon patellar reflexes are equal.  Tongue and uvula are midline.   HEART:  Rhythm is regular.  S1, S2 present.  Rhythm is regular.   ABDOMEN:  Soft, nontender, nondistended.   EXTREMITIES:  No substantial peripheral edema is noted.      LABORATORY DATA:  Sodium 137, potassium 3.9, BUN 25, creatinine 0.82.  White blood cell count 6.8, hemoglobin 12.9, platelets are 282.      IMAGIN.  Chest x-ray:  Impression is no effusions are evident, lungs are clear, and heart is normal in size.   2.  CTA of the head:  Impression is high-grade stenosis involving the right anterior cerebral artery, A3 segment.  Severe plaquing of the bilateral carotid bifurcations without definite flow-limiting stenosis.  Evaluation is limited due to motion artifact.   3.  CT scan of the head:  Preliminary read is no acute stroke.      ASSESSMENT AND PLAN:      1.  This is a 89-year-old female presenting with what appears to be a transient ischemic attack in the setting of an ICA stenosis.  Her symptomatology would be consistent with that anatomy.  She is being admitted for stroke evaluation.  There is a carotid plaque noted without flow-limiting lesions.  We will consult the Neurology Service.  We will order a carotid ultrasounds at this point.  Defer further vascular management to Neurology.  I suspect the patient's labile hypertension is also contributing.  Of note, she does report that she measured her blood pressure at home and sometimes self-titrates her atenolol, specifically.  It may be reasonable to consider switching her to a different beta blocker for now.  We will allow for permissive hypertension.  She does have hydralazine ordered on a p.r.n. basis.  Otherwise, the patient can continue her oral hydralazine, Atenolol at 100 mg daily, and her losartan.  For now, we will plan on holding her Lasix.  She does have a slight metabolic alkalosis consider the possibility of a contraction alkalosis.   2.  Hypothyroidism.  She can continue her Synthroid.   3.  Disposition:  The patient was admitted to observation with Neurology consultation, echocardiogram, and carotid ultrasound.  Further recommendations based on results of these studies.  I suspect this will be a short hospital stay.   4.  CODE STATUS:  Full code.      TOTAL TIME SPENT:  Greater than 50% of which involved counseling and coordination of patient care, is 45 minutes.         NIMISHA CONWAY MD             D: 11/15/2019   T: 11/15/2019   MT: DINORA      Name:     MODESTA ALEJO   MRN:      [id number]        Account:      [id number]   :      10/28/1923        Admitted:     11/15/2019                   Document: [id number]       cc: Yoan Saldana MD

## 2019-11-16 NOTE — PROGRESS NOTES
"   11/16/19 1015   Quick Adds   Type of Visit Initial PT Evaluation   Living Environment   Lives With alone   Living Arrangements house   Home Accessibility stairs to enter home;stairs within home   Number of Stairs, Main Entrance 2   Stair Railings, Main Entrance none   Number of Stairs, Within Home, Primary 10   Stair Railings, Within Home, Primary railings on both sides of stairs   Transportation Anticipated car, drives self;family or friend will provide   Living Environment Comment main floor living but basement laundry and shower. Pt reportst hat she still drives to [place] and for groceries   Self-Care   Usual Activity Tolerance good   Current Activity Tolerance moderate   Regular Exercise Yes   Activity/Exercise Type other (see comments)  (exercycle )   Exercise Amount/Frequency daily   Equipment Currently Used at Home none   Functional Level Prior   Ambulation 0-->independent   Transferring 0-->independent   Toileting 0-->independent   Bathing 0-->independent   Communication 0-->understands/communicates without difficulty   Swallowing 0-->swallows foods/liquids without difficulty   Cognition 0 - no cognition issues reported   Fall history within last six months no   Prior Functional Level Comment Pt did have both knees buckle yesterday but caught herself with a chair   General Information   Onset of Illness/Injury or Date of Surgery - Date 11/15/19   Referring Physician Karen Sheikh MD   Patient/Family Goals Statement \"Go home\"   Pertinent History of Current Problem (include personal factors and/or comorbidities that impact the POC) 89 YOF presented with L side weakness, near fall, spells. Admitted under OBS status. Work-up pending for possible TIA/CVA. PMH: HTN   Precautions/Limitations fall precautions   Weight-Bearing Status - LUE full weight-bearing   Weight-Bearing Status - RUE full weight-bearing   Weight-Bearing Status - LLE full weight-bearing   Weight-Bearing Status - RLE full weight-bearing "   General Observations Pleasant and cooperative   General Info Comments Activity: ambulate with assist   Cognitive Status Examination   Orientation orientation to person, place and time   Level of Consciousness alert   Follows Commands and Answers Questions 100% of the time;able to follow multistep instructions   Personal Safety and Judgment intact   Memory intact   Pain Assessment   Patient Currently in Pain No   Posture    Posture Forward head position;Protracted shoulders;Kyphosis   Range of Motion (ROM)   ROM Comment BLEs WFL   Strength   Strength Comments BLEs 5/5 throughout   Bed Mobility   Bed Mobility Comments Independent   Transfer Skills   Transfer Comments Sit<>stand with SBA   Gait   Gait Comments Gait without a device x 10' with CGA, wide JAUN< decreased step length B, reaches out for UE support   Balance   Balance Comments Good sitting and static standing, requires UE support for dynamic standing   Sensory Examination   Sensory Perception no deficits were identified   Coordination   Coordination no deficits were identified   General Therapy Interventions   Planned Therapy Interventions gait training;transfer training   Clinical Impression   Criteria for Skilled Therapeutic Intervention yes, treatment indicated   PT Diagnosis Impaired gait   Influenced by the following impairments impaired balance   Functional limitations due to impairments transfers, gait, stairs   Clinical Presentation Stable/Uncomplicated   Clinical Presentation Rationale see above   Clinical Decision Making (Complexity) Low complexity   Therapy Frequency Other (see comments)  (eval plus one treat)   Predicted Duration of Therapy Intervention (days/wks) 1 day   Anticipated Equipment Needs at Discharge walker   Anticipated Discharge Disposition Home with Assist   Risk & Benefits of therapy have been explained Yes   Patient, Family & other staff in agreement with plan of care Yes   Clinical Impression Comments Pt reports that her  "daughters can do the driving and assist with navigating exterior stairs and to the basement to complete laundry. Pt will benefit from one session to instruct in safe walker use   [facility] TM \"6 Clicks\"   2016, Trustees of [facility], under license to [employer].  All rights reserved.   6 Clicks Short Forms Basic Mobility Inpatient Short Form   [facility]  \"6 Clicks\" V.2 Basic Mobility Inpatient Short Form   1. Turning from your back to your side while in a flat bed without using bedrails? 4 - None   2. Moving from lying on your back to sitting on the side of a flat bed without using bedrails? 4 - None   3. Moving to and from a bed to a chair (including a wheelchair)? 3 - A Little   4. Standing up from a chair using your arms (e.g., wheelchair, or bedside chair)? 3 - A Little   5. To walk in hospital room? 3 - A Little   6. Climbing 3-5 steps with a railing? 3 - A Little   Basic Mobility Raw Score (Score out of 24.Lower scores equate to lower levels of function) 20   Total Evaluation Time   Total Evaluation Time (Minutes) 10     "

## 2019-11-16 NOTE — PROVIDER NOTIFICATION
MD Notification    Notified Person: MD    Notified Person Name: Dr Razo    Notification Date/Time: 11/16/2019 6:32 AM     Notification Interaction: paged    Purpose of Notification: patient UA has come back with WBC, large leuks. Here with TIA/AMS. No UC ordered. Please advise.    Orders Received: Dr Razo ordered a UC. Will have day shift review and start antibiotics as needed pending those results    Comments:

## 2019-11-16 NOTE — PROVIDER NOTIFICATION
"Text page to Dr. Grider, \"Tele strip from 0813 shows afib with SVR, 12 lead ekg ordered to confirm FYI.\"  "

## 2019-11-16 NOTE — PROGRESS NOTES
[facility]      OUTPATIENT OCCUPATIONAL THERAPY  EVALUATION  PLAN OF TREATMENT FOR OUTPATIENT REHABILITATION  (COMPLETE FOR INITIAL CLAIMS ONLY)  Patient's Last Name, First Name, M.I.  YOB: 1923  Rhea Abad                          Provider's Name  [facility] Medical Record No.  [id number]                               Onset Date:  11/15/19   Start of Care Date:  11/16/19      Type:     ___PT   _X_OT   ___SLP Medical Diagnosis:                           OT Diagnosis:  Decreased Ind w/I/ADL   Visits from SOC:  1   _________________________________________________________________________________  Plan of Treatment/Functional Goals    Planned Interventions:  ,       Goals: See Occupational Therapy Goals on Care Plan in [facility] electronic health record.    Therapy Frequency: Daily  Predicted Duration of Therapy Intervention: Eval and one treat  _________________________________________________________________________________    I CERTIFY THE NEED FOR THESE SERVICES FURNISHED UNDER        THIS PLAN OF TREATMENT AND WHILE UNDER MY CARE     (Physician co-signature of this document indicates review and certification of the therapy plan).                 Certification date from: 11/16/19, Certification date to: 11/16/19           ,      Referring Physician: Karen Sheikh MD            Initial Assessment        See Occupational Therapy evaluation dated   in Epic electronic health record.

## 2019-11-16 NOTE — PLAN OF CARE
A&Ox4. Neuros intact. VSS, ex bradycardia. Atenolol held this AM d/t HR <60. Tele SB, run of afib noted on tele this AM, MD notified, pt reports no symptoms. 12 lead EKG later in the morning revealed SB. Reg diet, thin liquids. Takes pills whole with water. Up with SBA, gait belt, walker. Denies pain. Pt scoring Green on the Aggression Stop Light Tool. Plan for home with HH OT. Discharge pending workup.  [id number]  Neuros unchanged. Awaiting ECHO result. NIHSS completed. Plan for likely discharge home tomorrow morning with OP therapies. PLC power point handout given to pt.

## 2019-11-16 NOTE — PLAN OF CARE
Pt here with TIA workup/possibly recent stroke on CT per neurology note. A&Ox4. Neuros intact. VSS. Tele SB PAC. Regular diet, thin liquids. Takes pills whole. Up with SBA. Denies pain. Pt scoring green on the Aggression Stop Light Tool. Plan to collect UA, CT head, see neuro, see therapies, echo, workup. No MRI ordered. Discharge pending workup.

## 2019-11-16 NOTE — CONSULTS
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        Contact information and PCP information verified: Yes  Lives With: alone   Living Arrangements: house                 Insurance concerns: No Insurance issues identified  ASSESSMENT  Patient currently receives the following services:  Pt lives indep at home        Identified issues/concerns regarding health management: Pt admitted with TIA.  PT recc home w/ OP PT.  OT rec HHOT.  Met with patient and explained role.  She does not feel she will be homebound and wants to do OP therapy.  MD in room and aware.  Dtr plans to stay with patient and is comfortable with this plan. Pt would like a walker at discharge. MD to order.  Pt/family wish to set up own f/u appts. Voice no other needs for discharge.     PLAN  Financial costs for the patient include TBD .  Patient given options and choices for discharge yes .  Patient/family is agreeable to the plan?  Yes:   Patient anticipates discharging to home .        Patient anticipates needs for home equipment: Yes  Transportation/person available to transport on day of discharge  is daugther and have they been notified/set up dtr here  Plan/Disposition: Home   Appointments: Family to make      Care  (CTS) will continue to follow as needed.                Geovanna Hein RN BSN  Inpatient Care Coordination  [facility]  [phone]

## 2019-11-16 NOTE — PLAN OF CARE
Discharge Planner OT   Patient plan for discharge: Home  Current status: OT orders rec'd, initial eval complete, treatment initiated. Pt admitted under OBS status for TIA work up due to L sided weakness. Pt lives alone in a one story home, but needs to ambulate stairs for laundry and shower. Pt previously IND w/I/ADL's.   Pt SBA for STS transfers, mod I using grab bars for toilet transfers, Ambulated in the hallway ~ 75 ft w/CGA and no adaptive equipment. Pt able to complete ascending and descending 3 stairs x2 trails w/CGA and use of rails on both sides. Pt IND w/g/h and IND w/donning/doffing LB clothing, Ind/w toileting. Pt has not further IP OT needs. Will complete orders.  Barriers to return to prior living situation: Stairs w/in home. Lives alone  Recommendations for discharge: Home w/assist of daughter for I/ADL's (laundry) and supervision w/ ambulating stairs and shower transfers. And HH OT for home safety eval.   Rationale for recommendations: Pt will benefit from assistance w/ambulating stairs for laundry and showering in lower level of home. And HH OT for increased safety and independence in the home.        Entered by: Althea Henriquez 11/16/2019 9:20 AM

## 2019-11-16 NOTE — PROGRESS NOTES
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 Had been in usual state of health prior to admission. At 1300 on day of admission, had sudden onset of \"feeling unwell\". May have had some transient mild slurred speech and LUE weakness. EMS was called and was back to baseline by the time they assessed her. Declined seeking evaluation in the ED. After EMS left, she checked her blood pressure and found it was elevated to 222/80. She then took hydralazine and two tabs of atenolol. Was agreeable to coming to ED for further evaluation. While in the ED, she was noted to have abrupt onset of L sided hemiplegia including the face. Sx lasted for 15 mins. Stat head imaging obtained. Head CT showed findings of a recent lacunar infarct in the R internal capsule anterior limb without acute ischemia. CTA head/neck showed high grade stenosis involving the R NAE (A3 segment) and severe plaquing a the bilateral carotid bifurcations without definite flow-limiting stenosis. Was back to her baseline by the time CT imaging was completed. Not a candidate for TPA. Clinical picture consistent with TIA in setting of known intracranial stenosis and use of additional antihypertensives (given degree of stenosis, perfusion of region is likely pressure dependent). Given aspirin and Plavix load in ED.      FLP this stay showed tot cholest 155, LDL 83, HDL 57. Trop neg. A1C 5.3.   Carotid US obtained and showed <50% stenosis bilaterally.   MRI brain showed acute infarcts in several vascular territories:  IMPRESSION:  1. Multiple acute infarcts involving the right anterior " cerebral artery distribution involving the right frontal lobe and paracentral lobule.  2. Punctate acute infarct involving the left anterior cerebral artery distribution within the left corona radiata.  3. A 1 cm infarct involving the genu of the internal capsule, caudate head, and globus pallidus.   4. Acute punctate infarct involving the left occipital lobe cuneus.     Echocardiogram pending.     Some concern for underlying arrhythmia on telemetry monitor but no confirmed afib on EKG this stay. Will discharge with 30 day cardiac event monitor.     -- maintained on aspirin and Plavix during stay, patient reports intolerance to aspirin with hx of GI bleed so will discharge on Plavix only.   -- cont low dose statin  -- cont antihypertensives  -- planning for outpatient PT/OT after discharge  -- will follow up with PCP in 1 wk and [facility] in 4 wks.      Hypertension  Allowing for permissive hypertension in the setting of above  Conts on atenolol 100mg daily, losartan 100mg daily, hydralazine 25mg TID and      Hypothyroidism  Chronic and stable on levothyroxine     Depression  Chronic and stable on citalopram     Abnl UA  UA on presentation showed lg leuk est with 153 WBCs and 154 RBCs, neg nitrites and mucous. Urine culture pending but in given absence of sx or other signs of infection so have not initiated antibiotics at this time.     FEN: no IVFs, lytes stable, regular diet  DVT Prophylaxis: PCDs  Code Status: Full Code    Disposition: Anticipate discharge home tomorrow pending findings on echocardiogram.    Stephanie Grider    Interval History   Seen this afternoon. Resting comfortably. No complaints. Denies cp/sob/cough, abd pain/n/v. Remains at baseline neurologically.     -Data reviewed today: I reviewed all new labs and imaging results over the last 24 hours. I personally reviewed no images or EKG's today.    Physical Exam   Temp: 97.5  F (36.4  C) Temp src: Oral BP: (!) 147/73  Pulse: 60 Heart Rate: 51 Resp: 18 SpO2: 95 % O2 Device: None (Room air)    Vitals:    11/15/19 1612   Weight: 47.2 kg (104 lb)     Vital Signs with Ranges  Temp:  [97.5  F (36.4  C)-98.1  F (36.7  C)] 97.5  F (36.4  C)  Pulse:  [59-60] 60  Heart Rate:  [51-55] 51  Resp:  [18] 18  BP: (121-192)/(56-95) 147/73  SpO2:  [94 %-97 %] 95 %  I/O last 3 completed shifts:  In: 1020 [P.O.:1020]  Out: 300 [Urine:300]    General: Resting comfortably, alert, conversive, NAD  CVS: HRRR with occ irregular beats, no MGR, no LE edema  Respiratory: CTAB, no wheeze/rales/rhonchi, no increased work of breathing  GI: S, NT, ND, +BS  Skin: Warm/dry  Neuro: A&Ox3, CNs 2-12 intact, no focal motor/sensory deficits    Medications     - MEDICATION INSTRUCTIONS -         aspirin  81 mg Oral Daily     atenolol  100 mg Oral Daily     atorvastatin  5 mg Oral QPM     citalopram  20 mg Oral Daily     clopidogrel  75 mg Oral or NG Tube Daily     hydrALAZINE  25 mg Oral TID     levothyroxine  100 mcg Oral QAM AC     losartan  100 mg Oral Daily     pantoprazole  40 mg Oral Daily       Data   Recent Labs   Lab 11/16/19  0551 11/15/19  1639   WBC  --  6.8   HGB  --  12.9   MCV  --  97   PLT  --  282   NA  --  137   POTASSIUM  --  3.9   CHLORIDE  --  104   CO2  --  33*   BUN  --  25   CR  --  0.82   ANIONGAP  --  <1*   GRUPO  --  8.9   GLC  --  125*   TROPI <0.015  --        Recent Results (from the past 24 hour(s))   CTA Head Neck with Contrast    Narrative    CT ANGIOGRAM OF THE HEAD AND NECK WITH CONTRAST  11/15/2019 5:39 PM     HISTORY: Code stroke     TECHNIQUE:  CT angiography with an injection of 70 mL [id number] IV  with scans through the head and neck. Images were transferred to a  separate 3-D workstation where multiplanar reformations and 3-D images  were created. Estimates of carotid stenoses are made relative to the  distal internal carotid artery diameters except as noted. Radiation  dose for this scan was reduced using automated exposure  control,  adjustment of the mA and/or kV according to patient size, or iterative  reconstruction technique.    COMPARISON: None.     CT ANGIOGRAM HEAD FINDINGS:    Scattered atherosclerotic irregularity is present. The intracranial  vertebral arteries, basilar artery, and posterior cerebral arteries  are patent. The internal carotid arteries, anterior cerebral arteries,  and middle cerebral arteries are patent. High-grade stenosis is  present involving the right anterior cerebral artery A3 segments. No  evidence of aneurysm or vascular reformation. Dural venous sinuses are  unremarkable. Right transverse sinus is dominant.     CT ANGIOGRAM NECK FINDINGS:   A three-vessel aortic arch is present. The bilateral common carotid,  anterior carotid, external carotid, and vertebral arteries are patent.  Severe plaquing is present at the bilateral carotid bifurcations.  There is approximately 50% narrowing of the proximal left internal  carotid artery and 30% narrowing of the proximal right internal  carotid artery. No evidence of dissection, occlusion, or flow-limiting  stenosis. Mild plaquing is present at the bilateral vertebral artery  origins with mild stenosis bilaterally.    Thyroid gland is small or absent. Moderate cervical spine degenerative  change is present.       Impression    IMPRESSION:   CTA Head: High-grade stenosis involving the right anterior cerebral  artery A3 segment.   CTA Neck: Severe plaquing at the bilateral carotid bifurcations  without definite flow-limiting stenosis. Evaluation is limited due to  motion artifact. Consider carotid Doppler ultrasound correlation.    PANCHO MARKS MD   US Carotid Bilateral    Narrative    US CAROTID BILATERAL 11/15/2019 9:46 PM    HISTORY: Plaque in the bilateral carotid bulbs.    COMPARISON: None.    FINDINGS:     Right side:   On the grayscale images, there is shadowing calcified plaque in the  right carotid bulb extending into the internal and external  carotid  arteries.  Spectral waveform analysis was performed. Peak systolic and diastolic  velocities in the right internal carotid artery are 97 and 24 cm/s.  Per sonographic criteria, degree of stenosis in the right internal  carotid artery is less than 50%.  Flow in the right vertebral artery is antegrade.     Left side:   On the grayscale images, there is shadowing calcified plaque in the  right carotid bulb extending into the internal and external carotid  arteries.  Spectral waveform analysis was performed. Peak systolic and diastolic   velocities in the left internal carotid artery are 115 and 20 cm/s.  Per sonographic criteria, degree of stenosis in the left internal  carotid artery is less than 50%.  Flow in the left vertebral artery is antegrade.       Impression    IMPRESSION: Per sonographic criteria, there are less than 50% stenoses  in the internal carotid arteries bilaterally.    Degree of stenosis measured relative to the diameter of the normal  internal carotid artery per NASCET or NASCET type criteria    PRAMOD GILMORE MD   MR Brain w/o & w Contrast    Narrative    MRI BRAIN WITHOUT AND WITH CONTRAST  11/16/2019 12:38 PM    HISTORY:  Stroke, follow up.     TECHNIQUE:  Multiplanar, multisequence MRI of the brain without and  with 5 mL Gadavist.    COMPARISON: None.    FINDINGS:  Cortical and subcortical diffusion restriction is present involving  the anterior aspect of the right superior frontal gyrus. Multiple  smaller punctate areas of diffusion restriction are present involving  the posterior aspect of the right superior frontal gyrus, right middle  frontal gyrus, and right paracentral lobule (series 5 image 92). A 1  cm area of patchy diffusion restriction is present corresponding to  the right internal capsule at the genu (series 5 image 75). Small area  of diffusion restriction is present within the right caudate head.  Punctate acute infarct is present involving the left occipital  lobe  cuneus. No evidence of hemorrhage or significant mass effect.  Frontoparietal prominent white matter T2 hyperintensities likely  represent chronic small vessel ischemic change. No abnormal  enhancement is identified. The visualized calvarium, tympanic  cavities, and paranasal sinuses are unremarkable. Trace opacification  of bilateral mastoid cavities, likely inflammatory.      Impression    IMPRESSION:  1. Multiple acute infarcts involving the right anterior cerebral  artery distribution involving the right frontal lobe and paracentral  lobule.  2. Punctate acute infarct involving the left anterior cerebral artery  distribution within the left corona radiata.  3. A 1 cm infarct involving the genu of the internal capsule, caudate  head, and globus pallidus.   4. Acute punctate infarct involving the left occipital lobe cuneus.    PANCHO MARKS MD

## 2019-11-17 ENCOUNTER — APPOINTMENT (OUTPATIENT)
Dept: CARDIOLOGY | Facility: CLINIC | Age: 84
End: 2019-11-17
Attending: INTERNAL MEDICINE
Payer: COMMERCIAL

## 2019-11-17 VITALS
WEIGHT: 104 LBS | OXYGEN SATURATION: 97 % | BODY MASS INDEX: 17.75 KG/M2 | TEMPERATURE: 98.5 F | DIASTOLIC BLOOD PRESSURE: 82 MMHG | SYSTOLIC BLOOD PRESSURE: 150 MMHG | HEIGHT: 64 IN | HEART RATE: 56 BPM | RESPIRATION RATE: 18 BRPM

## 2019-11-17 LAB
BACTERIA SPEC CULT: ABNORMAL
Lab: ABNORMAL
SPECIMEN SOURCE: ABNORMAL

## 2019-11-17 PROCEDURE — 93270 REMOTE 30 DAY ECG REV/REPORT: CPT

## 2019-11-17 PROCEDURE — 93272 ECG/REVIEW INTERPRET ONLY: CPT | Performed by: INTERNAL MEDICINE

## 2019-11-17 PROCEDURE — 25000132 ZZH RX MED GY IP 250 OP 250 PS 637: Performed by: PHYSICIAN ASSISTANT

## 2019-11-17 PROCEDURE — G0378 HOSPITAL OBSERVATION PER HR: HCPCS

## 2019-11-17 PROCEDURE — 99217 ZZC OBSERVATION CARE DISCHARGE: CPT | Performed by: INTERNAL MEDICINE

## 2019-11-17 PROCEDURE — 25000132 ZZH RX MED GY IP 250 OP 250 PS 637: Performed by: INTERNAL MEDICINE

## 2019-11-17 RX ADMIN — ATENOLOL 100 MG: 50 TABLET ORAL at 08:39

## 2019-11-17 RX ADMIN — CITALOPRAM HYDROBROMIDE 20 MG: 20 TABLET ORAL at 08:39

## 2019-11-17 RX ADMIN — LOSARTAN POTASSIUM 100 MG: 100 TABLET, FILM COATED ORAL at 08:39

## 2019-11-17 RX ADMIN — CLOPIDOGREL BISULFATE 75 MG: 75 TABLET ORAL at 08:39

## 2019-11-17 RX ADMIN — PANTOPRAZOLE SODIUM 40 MG: 40 TABLET, DELAYED RELEASE ORAL at 08:39

## 2019-11-17 RX ADMIN — ASPIRIN 81 MG 81 MG: 81 TABLET ORAL at 08:39

## 2019-11-17 RX ADMIN — HYDRALAZINE HYDROCHLORIDE 25 MG: 25 TABLET ORAL at 08:39

## 2019-11-17 RX ADMIN — LEVOTHYROXINE SODIUM 100 MCG: 100 TABLET ORAL at 07:21

## 2019-11-17 NOTE — DISCHARGE SUMMARY
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 Had been in usual state of health prior to admission. At 1300 on day of admission, had sudden onset of \"feeling unwell\". May have had some transient mild slurred speech and LUE weakness. EMS was called and was back to baseline by the time they assessed her. Declined seeking evaluation in the ED. After EMS left, she checked her blood pressure and found it was elevated to 222/80. She then took hydralazine and two tabs of atenolol. Was agreeable to coming to ED for further evaluation. While in the ED, she was noted to have abrupt onset of L sided hemiplegia including the face. Sx lasted for 15 mins. Stat head imaging obtained. Head CT showed findings of a recent lacunar infarct in the R internal capsule anterior limb without acute ischemia. CTA head/neck showed high grade stenosis involving the R NAE (A3 segment) and severe plaquing a the bilateral carotid bifurcations without definite flow-limiting stenosis. Was back to her baseline by the time CT imaging was completed. Not a candidate for TPA. Clinical picture consistent with TIA in setting of known intracranial stenosis and use of additional " antihypertensives (given degree of stenosis, perfusion of region is likely pressure dependent). Given aspirin and Plavix load in ED.     Carotid US obtained and showed <50% stenosis bilaterally.   FLP this stay showed tot cholest 155, LDL 83, HDL 57. Trop neg. A1C 5.3.   MRI brain showed acute infarcts in several vascular territories:  IMPRESSION:  1. Multiple acute infarcts involving the right anterior cerebral artery distribution involving the right frontal lobe and paracentral lobule.  2. Punctate acute infarct involving the left anterior cerebral artery distribution within the left corona radiata.  3. A 1 cm infarct involving the genu of the internal capsule, caudate head, and globus pallidus.   4. Acute punctate infarct involving the left occipital lobe cuneus.    Echocardiogram showed EF 60-65% with grade II diastolic dysfunction, a severely dilated left atrium and mild to mod MR as well as moderate pulmonary hypertension. Bubble study was negative.    Some concern for underlying arrhythmia on telemetry monitor (PACs, sinus davonte) but no confirmed afib on EKG or telemetry this stay. Will discharge with 30 day cardiac event monitor.    Maintained on aspirin and Plavix during stay. Patient reports intolerance to aspirin with hx of GI bleed so was discharged on Plavix only. Low dose atorvastatin also prescribed at discharge. Antihypertensives continued without changes. Seen by PT/OT this stay -- orders placed for outpatient PT/OT. Walker given at discharge.    Will follow up with PCP in 1 wk and [facility] in 4 wks.      Hypertension  Allowing for permissive hypertension in the setting of above  Conts on atenolol 100mg daily, losartan 100mg daily, hydralazine 25mg TID and      Hypothyroidism  Chronic and stable on levothyroxine     Depression  Chronic and stable on citalopram     Abnl UA  UA on presentation showed lg leuk est with 153 WBCs and 154 RBCs, neg nitrites and mucous. Urine culture  pending but in given absence of sx or other signs of infection did not initiate antibiotics at this time. Urine culture pending at time of discharge.    Stephanie Grider DO    Significant Results and Procedures   See imaging reports below.    Pending Results   These results will be followed up by PCP  Unresulted Labs Ordered in the Past 30 Days of this Admission     Date and Time Order Name Status Description    11/16/2019 0634 Urine Culture Aerobic Bacterial Preliminary           Code Status   Full Code       Primary Care Physician   Yoan TYRA Shana    Physical Exam   Temp: 98.5  F (36.9  C) Temp src: Oral BP: (!) 150/82 Pulse: 56 Heart Rate: 60 Resp: 18 SpO2: 97 % O2 Device: None (Room air)    Vitals:    11/15/19 1612   Weight: 47.2 kg (104 lb)     Vital Signs with Ranges  Temp:  [97.5  F (36.4  C)-98.5  F (36.9  C)] 98.5  F (36.9  C)  Pulse:  [56-60] 56  Heart Rate:  [55-60] 60  Resp:  [18] 18  BP: (132-151)/(66-82) 150/82  SpO2:  [92 %-97 %] 97 %  I/O last 3 completed shifts:  In: 1380 [P.O.:1380]  Out: 100 [Urine:100]    General: Resting comfortably, alert, conversive, NAD  CVS: HRRR with occ irregular beats, no MGR, no LE edema  Respiratory: CTAB, no wheeze/rales/rhonchi, no increased work of breathing  GI: S, NT, ND, +BS  Skin: Warm/dry  Neuro: A&Ox3, CNs 2-12 intact, no focal motor/sensory deficits    Discharge Disposition   Discharged to home  Condition at discharge: Stable    Consultations This Hospital Stay   PHYSICAL THERAPY ADULT IP CONSULT  OCCUPATIONAL THERAPY ADULT IP CONSULT  NEUROLOGY IP CONSULT    Time Spent on this Encounter   Stephanie GARDNER DO, personally saw the patient today and spent greater than 30 minutes discharging this patient.    Discharge Orders      Physical Therapy Referral      Occupational Therapy Referral      Reason for your hospital stay    Evaluation of your weakness, which was due to a stroke.     Follow-up and recommended labs and tests     1. Follow up  with your PCP in the next week.   2. Follow up with [facility] in the next 2-4 weeks.     Activity    Your activity upon discharge: activity as tolerated     Diet    Follow this diet upon discharge: Regular     Discharge Medications   Current Discharge Medication List      START taking these medications    Details   atorvastatin (LIPITOR) 10 MG tablet Take 0.5 tablets (5 mg) by mouth every evening  Qty: 15 tablet, Refills: 0    Comments: Future refills by PCP Dr. Yoan Saldana with phone number [phone].  Associated Diagnoses: Cerebrovascular accident (CVA) due to bilateral embolism of anterior cerebral arteries (H)      clopidogrel (PLAVIX) 75 MG tablet Take 1 tablet (75 mg) by mouth daily  Qty: 30 tablet, Refills: 0    Comments: Future refills by PCP Dr. Yoan Saldana with phone number [phone].  Associated Diagnoses: Cerebrovascular accident (CVA) due to bilateral embolism of anterior cerebral arteries (H)      order for DME Equipment being ordered: Walker Wheels () and Walker ()  Treatment Diagnosis: Impaired gait  Qty: 1 each, Refills: 0    Associated Diagnoses: TIA (transient ischemic attack)         CONTINUE these medications which have NOT CHANGED    Details   ascorbic acid (VITAMIN C) 500 MG CPCR CR capsule Take 500 mg by mouth daily Takes in the afternoon.      atenolol (TENORMIN) 50 MG tablet Take 100 mg by mouth 2 times daily Rx written for 100 mg BID. Patient takes 100 mg (2 x 50 mg) in the morning and then checks blood pressure in the afternoon. If SBP >160-180, will take 100 mg (2 x 50 mg) in the evening. If SBP < ~160 will take 50 mg.      calcium carbonate (CALCIUM CARBONATE) 600 MG tablet Take 600 mg by mouth daily Takes in the afternoon      calcium carbonate (TUMS) 500 MG chewable tablet Take 1-2 chew tab by mouth daily as needed for heartburn      calcium polycarbophil (FIBERCON) 625 MG tablet Take 2 tablets by mouth daily In the afternoon      citalopram  (CELEXA) 20 MG tablet Take 20 mg by mouth daily       dimenhyDRINATE 50 MG CHEW Take 50 mg by mouth At Bedtime      furosemide (LASIX) 20 MG tablet Take 20 mg by mouth every morning      hydrALAZINE (APRESOLINE) 25 MG tablet Take 25 mg by mouth 2 times daily Written TID, patient taking BID.      levothyroxine (LEVOXYL) 100 MCG tablet Take 1 tablet by mouth daily.  Qty: 90 tablet, Refills: 3    Associated Diagnoses: Hypothyroidism      LORazepam (ATIVAN) 0.5 MG tablet Take 0.5 mg by mouth 2 times daily as needed for anxiety      losartan (COZAAR) 100 MG tablet Take 100 mg by mouth daily       Multiple Vitamins-Minerals (ONE-A-DAY WOMENS 50 PLUS PO) Take 1 tablet by mouth daily In the afternoon      omeprazole (PRILOSEC) 20 MG DR capsule Take 20 mg by mouth daily      senna-docusate (SENOKOT-S/PERICOLACE) 8.6-50 MG tablet Take 1 tablet by mouth daily as needed for constipation      Tetrahydrozoline HCl (VISINE OP) Place 1 drop into both eyes daily as needed         STOP taking these medications       pantoprazole (PROTONIX) 40 MG enteric coated tablet Comments:   Reason for Stopping:             Allergies   Allergies   Allergen Reactions     Lisinopril Cough     No Known Drug Allergies      Data   Most Recent 3 CBC's:  Recent Labs   Lab Test 11/15/19  1639 10/05/19  1814 09/25/18  2011   WBC 6.8 8.3 7.6   HGB 12.9 13.1 13.4   MCV 97 94 94    352 266      Most Recent 3 BMP's:  Recent Labs   Lab Test 11/15/19  1639 10/05/19  1814 09/25/18  2011    136 136   POTASSIUM 3.9 4.0 4.0   CHLORIDE 104 105 101   CO2 33* 26 28   BUN 25 13 18   CR 0.82 0.74 0.64   ANIONGAP <1* 5 7   GRUPO 8.9 8.7 9.0   * 103* 91     Most Recent 3 Troponin's:  Recent Labs   Lab Test 11/16/19 0551 09/25/18 2011 09/22/18  1850   TROPI <0.015 0.045 <0.015     Most Recent Cholesterol Panel:  Recent Labs   Lab Test 11/16/19 0551   CHOL 155   LDL 83   HDL 57   TRIG 77     Most Recent 6 Bacteria Isolates From Any Culture (See EPIC  Reports for Culture Details):  Recent Labs   Lab Test 11/16/19  0530   CULT PENDING     Most Recent TSH, T4 and A1c Labs:  Recent Labs   Lab Test 11/15/19  1639   A1C 5.3     Results for orders placed or performed during the hospital encounter of 11/15/19   XR Chest 2 Views    Narrative    XR CHEST TWO VIEWS   11/15/2019 5:10 PM     HISTORY: Cough, weakness.    COMPARISON: Chest x-ray 10/5/2019.      Impression    IMPRESSION: PA and lateral views of the chest. Lungs are clear. Heart  is normal in size. No effusions are evident. No pneumothorax. Large  hiatal hernia or Bochdalek hernia is again noted.    ADITI PADILLA MD   CT Head w/o Contrast    Addendum: 11/15/2019    MODESTA ALEJO  [id number]    Findings were discussed by phone between Dr. Marks and Dr. Trierweiler at 5:43 PM on 11/15/2019.    Alvaro Marks MD ( Date of Addendum: 11/15/2019 )    ALVARO MARKS MD      Narrative    CT SCAN OF THE HEAD WITHOUT CONTRAST   11/15/2019 4:54 PM     HISTORY: Altered mental state.    TECHNIQUE: Axial images of the head and coronal reformations without  IV contrast material. Radiation dose for this scan was reduced using  automated exposure control, adjustment of the mA and/or kV according  to patient size, or iterative reconstruction technique.    COMPARISON: None.    FINDINGS:  Mild volume loss is present. Patchy white matter hypoattenuation  likely represents chronic small ischemic change. Possible old lacunar  infarct within the right internal capsule anterior limb. No evidence  of acute ischemia, hemorrhage, mass, mass effect, or hydrocephalus.  The visualized calvarium, tympanic cavities, mastoid cavities, and  paranasal sinuses are unremarkable. Partially calcified lesion along  the left posterior parietal calvarium like represents epidermal  inclusion or sebaceous cyst.      Impression    IMPRESSION:   No acute intracranial abnormality.    ALVARO MARKS MD   CTA Head Neck with Contrast    Narrative     CT ANGIOGRAM OF THE HEAD AND NECK WITH CONTRAST  11/15/2019 5:39 PM     HISTORY: Code stroke     TECHNIQUE:  CT angiography with an injection of 70 mL [id number] IV  with scans through the head and neck. Images were transferred to a  separate 3-D workstation where multiplanar reformations and 3-D images  were created. Estimates of carotid stenoses are made relative to the  distal internal carotid artery diameters except as noted. Radiation  dose for this scan was reduced using automated exposure control,  adjustment of the mA and/or kV according to patient size, or iterative  reconstruction technique.    COMPARISON: None.     CT ANGIOGRAM HEAD FINDINGS:    Scattered atherosclerotic irregularity is present. The intracranial  vertebral arteries, basilar artery, and posterior cerebral arteries  are patent. The internal carotid arteries, anterior cerebral arteries,  and middle cerebral arteries are patent. High-grade stenosis is  present involving the right anterior cerebral artery A3 segments. No  evidence of aneurysm or vascular reformation. Dural venous sinuses are  unremarkable. Right transverse sinus is dominant.     CT ANGIOGRAM NECK FINDINGS:   A three-vessel aortic arch is present. The bilateral common carotid,  anterior carotid, external carotid, and vertebral arteries are patent.  Severe plaquing is present at the bilateral carotid bifurcations.  There is approximately 50% narrowing of the proximal left internal  carotid artery and 30% narrowing of the proximal right internal  carotid artery. No evidence of dissection, occlusion, or flow-limiting  stenosis. Mild plaquing is present at the bilateral vertebral artery  origins with mild stenosis bilaterally.    Thyroid gland is small or absent. Moderate cervical spine degenerative  change is present.       Impression    IMPRESSION:   CTA Head: High-grade stenosis involving the right anterior cerebral  artery A3 segment.   CTA Neck: Severe plaquing at the  bilateral carotid bifurcations  without definite flow-limiting stenosis. Evaluation is limited due to  motion artifact. Consider carotid Doppler ultrasound correlation.    PANCHO MARKS MD   US Carotid Bilateral    Narrative    US CAROTID BILATERAL 11/15/2019 9:46 PM    HISTORY: Plaque in the bilateral carotid bulbs.    COMPARISON: None.    FINDINGS:     Right side:   On the grayscale images, there is shadowing calcified plaque in the  right carotid bulb extending into the internal and external carotid  arteries.  Spectral waveform analysis was performed. Peak systolic and diastolic  velocities in the right internal carotid artery are 97 and 24 cm/s.  Per sonographic criteria, degree of stenosis in the right internal  carotid artery is less than 50%.  Flow in the right vertebral artery is antegrade.     Left side:   On the grayscale images, there is shadowing calcified plaque in the  right carotid bulb extending into the internal and external carotid  arteries.  Spectral waveform analysis was performed. Peak systolic and diastolic   velocities in the left internal carotid artery are 115 and 20 cm/s.  Per sonographic criteria, degree of stenosis in the left internal  carotid artery is less than 50%.  Flow in the left vertebral artery is antegrade.       Impression    IMPRESSION: Per sonographic criteria, there are less than 50% stenoses  in the internal carotid arteries bilaterally.    Degree of stenosis measured relative to the diameter of the normal  internal carotid artery per NASCET or NASCET type criteria    PRAMOD GILMORE MD   MR Brain w/o & w Contrast    Narrative    MRI BRAIN WITHOUT AND WITH CONTRAST  11/16/2019 12:38 PM    HISTORY:  Stroke, follow up.     TECHNIQUE:  Multiplanar, multisequence MRI of the brain without and  with 5 mL Gadavist.    COMPARISON: None.    FINDINGS:  Cortical and subcortical diffusion restriction is present involving  the anterior aspect of the right superior frontal gyrus.  Multiple  smaller punctate areas of diffusion restriction are present involving  the posterior aspect of the right superior frontal gyrus, right middle  frontal gyrus, and right paracentral lobule (series 5 image 92). A 1  cm area of patchy diffusion restriction is present corresponding to  the right internal capsule at the genu (series 5 image 75). Small area  of diffusion restriction is present within the right caudate head.  Punctate acute infarct is present involving the left occipital lobe  cuneus. No evidence of hemorrhage or significant mass effect.  Frontoparietal prominent white matter T2 hyperintensities likely  represent chronic small vessel ischemic change. No abnormal  enhancement is identified. The visualized calvarium, tympanic  cavities, and paranasal sinuses are unremarkable. Trace opacification  of bilateral mastoid cavities, likely inflammatory.      Impression    IMPRESSION:  1. Multiple acute infarcts involving the right anterior cerebral  artery distribution involving the right frontal lobe and paracentral  lobule.  2. Punctate acute infarct involving the left anterior cerebral artery  distribution within the left corona radiata.  3. A 1 cm infarct involving the genu of the internal capsule, caudate  head, and globus pallidus.   4. Acute punctate infarct involving the left occipital lobe cuneus.    PANCHO MARKS MD   Echocardiogram Complete - Bubble study    Narrative    [id number]  [id number]  [id number]  [id number]^MAN^NIMISHA^SHELLIE                                                                          Version 2        [facility]  Echocardiography Laboratory  [address] [place] [address]        Name: MODESTA ALEJO  MRN: [id number]  : 10/28/1923  Study Date: 2019 01:06 PM  Age: 89 yrs  Gender: Female  Patient Location: [facility]  Reason For Study: TIA  Ordering Physician: NIMISHA CONWAY  Referring Physician: Yoan Saldana  Performed By: Katherine Almonte      BSA: 1.5 m2  Height: 64 in  Weight: 104 lb  HR: 58  BP: 132/72 mmHg  _____________________________________________________________________________  __        Procedure  Complete Portable Bubble Echo Adult.  _____________________________________________________________________________  __        Interpretation Summary     The left ventricle is normal in size. There is mild concentric left  ventricular hypertrophy. Left ventricular systolic function is normal. The EF  is estimated at 60-65%. Grade II or moderate diastolic dysfunction.  The right ventricle is normal in size and function.  The left atrium is severely dilated. The right atrium is mildly dilated.  There is mild to moderate (1-2+) mitral regurgitation. No Mitral stenosis.  The right ventricular systolic pressure is approximated at 46.5 mmHg plus the  right atrial pressure. Right ventricular systolic pressure is elevated,  consistent with moderate pulmonary hypertension.  Negative Bubble study.  No piror study.  _____________________________________________________________________________  __        Left Ventricle  The left ventricle is normal in size. There is mild concentric left  ventricular hypertrophy. Left ventricular systolic function is normal. The  visual ejection fraction is estimated at 60-65%. Grade II or moderate  diastolic dysfunction. No regional wall motion abnormalities noted.     Right Ventricle  The right ventricle is normal in size and function.     Atria  The left atrium is severely dilated. The right atrium is mildly dilated. A  contrast injection (Bubble Study) was performed that was negative for flow  across the interatrial septum.     Mitral Valve  The mitral valve leaflets appear thickened, but open well. There is moderate  mitral annular calcification. Thickened mitral valve posterior leaflet. There  is mild to moderate (1-2+) mitral regurgitation.        Tricuspid Valve  Normal tricuspid valve. There is trace tricuspid  regurgitation. The right  ventricular systolic pressure is approximated at 46.5 mmHg plus the right  atrial pressure. Right ventricular systolic pressure is elevated, consistent  with moderate pulmonary hypertension.     Aortic Valve  The aortic valve is trileaflet with aortic valve sclerosis.     Pulmonic Valve  The pulmonic valve is not well visualized.     Vessels  Normal size aorta. IVC diameter <2.1 cm collapsing >50% with sniff suggests a  normal RA pressure of 3 mmHg.     Pericardium  There is no pericardial effusion.        Rhythm  Sinus rhythm was noted.  _____________________________________________________________________________  __  MMode/2D Measurements & Calculations  IVSd: 1.2 cm     LVIDd: 4.2 cm  LVIDs: 2.7 cm  LVPWd: 1.1 cm  FS: 35.0 %  LV mass(C)d: 173.3 grams  LV mass(C)dI: 117.0 grams/m2  Ao root diam: 3.3 cm  LA dimension: 4.0 cm  LA/Ao: 1.2  LA Volume (BP): 86.1 ml  LA Volume Index (BP): 58.2 ml/m2  RWT: 0.54           Doppler Measurements & Calculations  MV E max calvin: 124.0 cm/sec  MV A max calvin: 80.5 cm/sec  MV E/A: 1.5  MV max P.8 mmHg  MV mean P.9 mmHg  MV V2 VTI: 36.5 cm  MV dec slope: 641.2 cm/sec2  MV dec time: 0.19 sec  Ao V2 max: 138.5 cm/sec  Ao max P.0 mmHg  Ao V2 mean: 91.7 cm/sec  Ao mean PG: 3.9 mmHg  Ao V2 VTI: 35.9 cm  TR max calvin: 340.9 cm/sec  TR max P.5 mmHg  E/E' av.1  Lateral E/e': 46.5  Medial E/e': 31.7           _____________________________________________________________________________  __           Report approved by: Kat Leslie 2019 06:00 PM

## 2019-11-17 NOTE — PLAN OF CARE
Pt here with stroke. A&Ox4. Neuros intact. VSS. Tele SB PACs. Regular diet, thin liquids. Takes pills whole. Up with SBA gait belt and walker. Denies pain. Pt scoring green on the Aggression Stop Light Tool. Plan to go home with HH OT today on cardiac monitor. Please review strips in chart as day shift did capture new a fib.

## 2019-11-17 NOTE — PROGRESS NOTES
"Reviewed echo.Pt not seen today, being discharged. Echo shows severely dilated left atrium. No definite afib has been seen on telemetry here. Recommend 30 day cardiac event monitoring on discharge followed by discussion with outpatient neurology regarding risk/benefit of AC if afib found. Pt may not be a good candidate for anticoagulation given prior GI bleed requiring transfusion, but would wait for confirmation of rhythm problem before having this discussion    India Powell PA-C  Neurology  11/17/2019 10:30 AM  Text Page (8am-5pm)  To page stroke neurology after hours or on a subsequent day, click here: AMCOM  Choose \"On Call\" tab at top, then search dropdown box for \"Neurology Adult\" & press Enter, look for Neuro ICU/Stroke    "

## 2019-11-17 NOTE — PLAN OF CARE
Pt discharged to home via private car accompanied by daughter. Prescriptions filled and sent with pt. Heart monitor on pt and walker sent with pt as well. Pt and daughter state understanding of discharge instructions.

## 2019-11-17 NOTE — PROVIDER NOTIFICATION
"Call placed to Ileana MCWILLIAMS: \"[address] M.W. Dr. Grider would like you to review her echo from yesterday.  She is aware about the history of GI bleed but is wondering about anticoagulation.  Please advise, Lorene CANO [phone]\"  "

## 2019-11-20 LAB — INTERPRETATION ECG - MUSE: NORMAL

## 2019-12-04 ENCOUNTER — HOSPITAL ENCOUNTER (OUTPATIENT)
Dept: OCCUPATIONAL THERAPY | Facility: CLINIC | Age: 84
Setting detail: THERAPIES SERIES
End: 2019-12-04
Attending: INTERNAL MEDICINE
Payer: COMMERCIAL

## 2019-12-04 DIAGNOSIS — I63.423 CEREBROVASCULAR ACCIDENT (CVA) DUE TO BILATERAL EMBOLISM OF ANTERIOR CEREBRAL ARTERIES (H): ICD-10-CM

## 2019-12-04 PROCEDURE — 97165 OT EVAL LOW COMPLEX 30 MIN: CPT | Mod: GO | Performed by: OCCUPATIONAL THERAPIST

## 2019-12-04 NOTE — PROGRESS NOTES
12/04/19 1300   Quick Adds   Quick Adds Certification   Type of Visit Initial Outpatient Occupational Therapy Evaluation   General Information   Start Of Care Date 12/04/19   Referring Physician Stephanie Grider,    Orders Evaluate and treat as indicated   Orders Date 11/16/19   Medical Diagnosis Cerebrovascular accident (CVA) due to bilateral embolism of anterior cerebral arteries   Onset of Illness/Injury or Date of Surgery 11/15/19   Precautions/Limitations   ([place], B hearing aids)   Additional Occupational Profile Info/Pertinent History of Current Problem Rhea Abad is a 89 year old female with PMHx of labile hypertension, hypothyroidism, hx of depression and hx of acute blood loss anemia dt GIB from [facility] who was admitted under observation on 11/15/2019 for evaluation of suspected TIA.   Comments/Observations Patient's daughter, Rose, present during eval.  Both patient and daughter feel she is 100% back to baseline.   Role/Living Environment   Current Community Support Family/friend caregiver  (daughter)   Patient role/Employment history Retired   Community/Avocational Activities Patient completes exercycle for 25 mins/day.  Patient enjoys shopping, crosswBiddingForGood, huge baseball fan, plays bridge 2x/month (has given that up lately because it isn't fun anymore).   Current Living Environment House  (alone)   Number of Stairs to Enter Home 2   Number of Stairs Within Home 10   Primary Bathroom Location/Comments Main level   Primary Bathroom Set Up/Equipment Tub/Shower combo   Additional Bathroom Set Up/Equipment Shower stall   Home/Community Accessibility Comments Laundry lower level, has been going up/down carrying laundry without any problems.   Prior Level - Transfers Independent   Prior Level - Ambulation Independent   Prior Level - ADLS Independent   Prior Responsibilities - IADL Meal Preparation;Housekeeping;Laundry;Shopping;Medication management;Finances;Driving   Prior Level  "Comments Daughter does all the yardwork.  Patient only drives in familiar areas, 5-10 mile radius of her home.     Patient/family Goals Statement None   Pain   Patient currently in pain Yes   Pain location R shoulder   Pain comments Possible rotator cuff tear.   Fall Risk Screen   Have you fallen 2 or more times in the past year? No   Have you fallen and had an injury in the past year? No   Cognitive Status Examination   Orientation Orientation to person, place and time   Level of Consciousness Alert   Follows Commands and Answers Questions 100% of the time;Able to follow multistep instructions   Personal Safety and Judgment Intact   Memory Intact   Memory Comments Reports no changes with memory, no concerns at this time.  [facility] Mental Status Exam: 30/30 (normal is 27+)   Visual Perception   Visual Perception Wears glasses  (reading glasses)   Visual Perception Comments No blurriness or double vision.    Sensation   Upper Extremity Sensory Examination No deficits were identified   Range of Motion (ROM)   ROM Comments LUE WNLs.  R shoulder ROM WFLs but painful.  Recommend patient to follow up with ortho.   Strength   Strength Comments LUE:  Grossly 4+/5, RUE:  shoulder flexion 4/5, abduction 3+/5 with pain   Hand Strength   Hand Dominance Right   Left Hand  (pounds) 25 pounds   Right Hand  (pounds) 30 pounds   Hand Strength Comments B hand strength falls WNLs for her age group.   Coordination   Upper Extremity Coordination No deficits were identified   Balance   Balance Comments Defer to PT.     Functional Mobility   Functional Mobility Comments \"I am not as comfortable walking by myself.  I can't walk a straight line.\"  Patient does not use an assistive device.   PT eval scheduled next week.   Mobility   Bed Mobility Comments Some dizziness with supine to sit.   Transfer Skill   Level of [place]: Transfers independent   Tub/Shower Transfer   Tub/Shower Transfer Comments Patient stands and " lowers the bottom of the tub.   Bathing   Level of [place] - Bathing independent   Upper Body Dressing   Level of [place]: Dress Upper Body independent   Lower Body Dressing   Level of [place]: Dress Lower Body independent   Toileting   Level of [place]: Toilet independent   Grooming   Level of [place]: Grooming independent   Eating/Self-Feeding   Level of [place]: Eating independent   Activity Tolerance   Activity Tolerance Patient does not nap, isn't sleeping well at night.  Reports increased fatigue since her stroke.   Instrumental Activities of Daily Living Assessment   IADL Assessment/Observations Patient has been completing all of her IADLs (medications, finances, cooking, cleaning, and driving).  Daughter has been staying with her.   Clinical Impression   Criteria for Skilled Therapeutic Interventions Met No;Evaluation only;No problems identified which require skilled intervention   Education Assessment   Barriers To Learning Hearing   Preferred Learning Style Reading;Demonstration;Pictures/video   Therapy Certification   Certification date from 12/04/19   Certification date to 12/04/19   Total Evaluation Time   OT Nicky, Low Complexity Minutes (46590) 45

## 2019-12-04 NOTE — PROGRESS NOTES
[facility]          OUTPATIENT OCCUPATIONAL THERAPY  EVALUATION  PLAN OF TREATMENT FOR OUTPATIENT REHABILITATION  (COMPLETE FOR INITIAL CLAIMS ONLY)  Patient's Last Name, First Name, M.I.  YOB: 1923  Rhea Abad                        Provider's Name  [facility] Medical Record No.  [id number]                               Onset Date:     11/15/19   Start of Care Date:     12/04/19   Type:     ___PT   _X_OT   ___SLP Medical Diagnosis:     Cerebrovascular accident (CVA) due to bilateral embolism of anterior cerebral arteries                          OT Diagnosis:       Visits from SOC:  1   _________________________________________________________________________________  Plan of Treatment/Functional Goals:      Goals: EVAL ONLY            Emely Sampson, OTR/L         I CERTIFY THE NEED FOR THESE SERVICES FURNISHED UNDER        THIS PLAN OF TREATMENT AND WHILE UNDER MY CARE .             Physician Signature               Date    X_____________________________________________________                  Certification date from: 12/04/19, Certification date to: 12/04/19               Referring Physician: Stephanie Grider,      Initial Assessment        See Epic Evaluation      Start Of Care Date: 12/04/19

## 2019-12-10 ENCOUNTER — HOSPITAL ENCOUNTER (OUTPATIENT)
Dept: PHYSICAL THERAPY | Facility: CLINIC | Age: 84
Setting detail: THERAPIES SERIES
End: 2019-12-10
Attending: INTERNAL MEDICINE
Payer: COMMERCIAL

## 2019-12-10 DIAGNOSIS — I63.423 CEREBROVASCULAR ACCIDENT (CVA) DUE TO BILATERAL EMBOLISM OF ANTERIOR CEREBRAL ARTERIES (H): ICD-10-CM

## 2019-12-10 PROCEDURE — 97161 PT EVAL LOW COMPLEX 20 MIN: CPT | Mod: GP | Performed by: PHYSICAL THERAPIST

## 2019-12-10 PROCEDURE — 97112 NEUROMUSCULAR REEDUCATION: CPT | Mod: GP | Performed by: PHYSICAL THERAPIST

## 2019-12-10 ASSESSMENT — 6 MINUTE WALK TEST (6MWT): TOTAL DISTANCE WALKED (FT): 1025

## 2019-12-11 NOTE — PROGRESS NOTES
12/10/19 1300   Quick Adds   Type of Visit Initial OP PT Evaluation   General Information   Start of Care Date 12/10/19   Referring Physician Dr. Stephanie Grider   Orders Evaluate and Treat as Indicated   Order Date 11/16/19   Medical Diagnosis Cerebrovascular accident (CVA) due to bilateral embolism of anterior cerebral arteries (H) I63.423   Onset of illness/injury or Date of Surgery 11/15/19   Surgical/Medical history reviewed Yes   Pertinent history of current problem (include personal factors and/or comorbidities that impact the POC) Recently under obervation for suspected TIA on 11/15/19. Followed up with OT and felt she was back to baseline but c/o of difficulty with balance. Feels balance started changing for about 6 months now.  Has had hearing aids for almost 2 years. Difficulty walking a straight line.    Pertinent Visual History  Vision is good. Plans on getting them checked soon.    Prior level of function comment Independent with all functional mobility, Did not use an AD. Exercycle 25 min/day. Reports leaves house 3-4x/week.    Living environment House[facility]   Patient/Family Goals Statement improve balance   General Information Comments Daughter Rose is with her.     Fall Risk Screen   Have you fallen 2 or more times in the past year? No   Have you fallen and had an injury in the past year? Yes   Timed Up and Go score (seconds) 10.03   Is patient a fall risk? Yes   Fall screen comments  Fall on ice last February.    Pain   Pain comments Baker's cyst on the back of R leg that is alittle painful with movement.    Cognitive Status Examination   Level of Consciousness alert   Cognitive Comment [place]. Wears B hearing aids.    Posture   Posture Comments Trunk lean to the R.  Client not really aware she is leaning R and even though shoulders are even appears shifted R so head is almost lined up over R LE.    Range of Motion (ROM)   ROM Comment Mild decreased heelcord ROM.    Strength   Strength Comments B  hip flexion 4/5, B knee extension 5/5, B dorsilflexion 5/5    Bed Mobility   Bed Mobility Comments Independent   Transfer Skills   Transfer Comments Independent   Gait   Gait Comments Client ambulates wtihout an AD. Good B heel toe gait pattern, good arm swing. unsteady with increased path deviation and overall unsteady.    Gait Special Tests Six Minute Walk Test   Feet 1025 Feet   Comments completed without an AD.  Unsteady   Balance Special Tests Sit to Stand Reps in 30 Seconds   Reps in 30 seconds 3   Sensory Examination   Sensory Perception Comments Denies tingling or numbness   Coordination   Coordination Comments not tested.    Planned Therapy Interventions   Planned Therapy Interventions balance training;strengthening   Clinical Impression   Criteria for Skilled Therapeutic Interventions Met yes, treatment indicated   PT Diagnosis Difficulty walking   Influenced by the following impairments impaird posture, mild decreased strength , decreased standing balance   Functional limitations due to impairments at increased risk for falls.    Clinical Presentation Stable/Uncomplicated   Clinical Presentation Rationale DGI, 6MWT, 30 sec STS, age and hearing   Clinical Decision Making (Complexity) Low complexity   Therapy Frequency 1 time/week   Predicted Duration of Therapy Intervention (days/wks) 60 days   Risk & Benefits of therapy have been explained Yes   Patient, Family & other staff in agreement with plan of care Yes   Goal 1   Goal Identifier DGI   Goal Description Client will score a 20 or better on DGI to demonstrate that she is at decreased risk for falls.    Target Date 02/07/20   Goal 2   Goal Identifier posture   Goal Description Client will be able to find midline with or without a use of a mirror and without any other cues in order to stand up straighter to decrease R lean and improve balance with all activities.    Target Date 02/07/20   Total Evaluation Time   PT Eval, Low Complexity Minutes ([id number]) 45

## 2019-12-11 NOTE — PROGRESS NOTES
[facility]        OUTPATIENT PHYSICAL THERAPY FUNCTIONAL EVALUATION  PLAN OF TREATMENT FOR OUTPATIENT REHABILITATION  (COMPLETE FOR INITIAL CLAIMS ONLY)  Patient's Last Name, First Name, M.I.  YOB: 1923  Rhea Abad     Provider's Name   [facility]   Medical Record No.  [id number]     Start of Care Date:  12/10/19   Onset Date:  11/15/19   Type:     _X__PT   ____OT  ____SLP Medical Diagnosis:   Cerebrovascular accident (CVA) due to bilateral embolism of anterior cerebral arteries (H) I63.423     PT Diagnosis:  Difficulty walking Visits from SOC:  1                              __________________________________________________________________________________  Plan of Treatment/Functional Goals:  balance training, strengthening           GOALS  DGI  Client will score a 20 or better on DGI to demonstrate that she is at decreased risk for falls.   02/07/20    posture  Client will be able to find midline with or without a use of a mirror and without any other cues in order to stand up straighter to decrease R lean and improve balance with all activities.   02/07/20            Therapy Frequency:  1 time/week   Predicted Duration of Therapy Intervention:  60 days    Sunitha Meadows, PT                                    I CERTIFY THE NEED FOR THESE SERVICES FURNISHED UNDER        THIS PLAN OF TREATMENT AND WHILE UNDER MY CARE     (Physician co-signature of this document indicates review and certification of the therapy plan).                Certification Date From:    12/10/19  Certification Date To:   2/7/20    Referring Provider:  Dr. Stephanie Grider    Initial Assessment  See Epic Evaluation- Start of Care Date: 12/10/19

## 2019-12-17 ENCOUNTER — HOSPITAL ENCOUNTER (OUTPATIENT)
Dept: PHYSICAL THERAPY | Facility: CLINIC | Age: 84
Setting detail: THERAPIES SERIES
End: 2019-12-17
Attending: INTERNAL MEDICINE
Payer: COMMERCIAL

## 2019-12-17 PROCEDURE — 97112 NEUROMUSCULAR REEDUCATION: CPT | Mod: GP | Performed by: PHYSICAL THERAPIST

## 2019-12-17 PROCEDURE — 97110 THERAPEUTIC EXERCISES: CPT | Mod: GP | Performed by: PHYSICAL THERAPIST

## 2019-12-30 NOTE — PROGRESS NOTES
PT Discharge summary: Client was seen for 2 appointments and then cancelled all further appointments saying she would just do the execises at home.  Had difficulty understanding how R side lean was affecting posture and balance.  No goals met due to early discharge.

## 2020-02-06 ENCOUNTER — TELEPHONE (OUTPATIENT)
Dept: MEDSURG UNIT | Facility: CLINIC | Age: 85
End: 2020-02-06

## 2021-01-06 ENCOUNTER — APPOINTMENT (OUTPATIENT)
Dept: CT IMAGING | Facility: CLINIC | Age: 86
End: 2021-01-06
Attending: EMERGENCY MEDICINE
Payer: COMMERCIAL

## 2021-01-06 ENCOUNTER — TRANSFERRED RECORDS (OUTPATIENT)
Dept: HEALTH INFORMATION MANAGEMENT | Facility: CLINIC | Age: 86
End: 2021-01-06

## 2021-01-06 ENCOUNTER — HOSPITAL ENCOUNTER (OUTPATIENT)
Facility: CLINIC | Age: 86
Setting detail: OBSERVATION
Discharge: HOME | End: 2021-01-09
Attending: EMERGENCY MEDICINE | Admitting: INTERNAL MEDICINE
Payer: COMMERCIAL

## 2021-01-06 DIAGNOSIS — I48.91 ATRIAL FIBRILLATION, UNSPECIFIED TYPE (H): ICD-10-CM

## 2021-01-06 DIAGNOSIS — R06.00 DYSPNEA, UNSPECIFIED TYPE: ICD-10-CM

## 2021-01-06 DIAGNOSIS — N39.0 UTI (URINARY TRACT INFECTION), UNCOMPLICATED: Primary | ICD-10-CM

## 2021-01-06 DIAGNOSIS — R82.90 ABNORMAL FINDING ON URINALYSIS: ICD-10-CM

## 2021-01-06 LAB
ABO + RH BLD: NORMAL
ABO + RH BLD: NORMAL
ALBUMIN SERPL-MCNC: 3.4 G/DL (ref 3.4–5)
ALBUMIN UR-MCNC: NEGATIVE MG/DL
ALP SERPL-CCNC: 126 U/L (ref 40–150)
ALT SERPL W P-5'-P-CCNC: 99 U/L (ref 0–50)
ANION GAP SERPL CALCULATED.3IONS-SCNC: 4 MMOL/L (ref 3–14)
APPEARANCE UR: ABNORMAL
AST SERPL W P-5'-P-CCNC: 56 U/L (ref 0–45)
BACTERIA #/AREA URNS HPF: ABNORMAL /HPF
BASOPHILS # BLD AUTO: 0.1 10E9/L (ref 0–0.2)
BASOPHILS NFR BLD AUTO: 0.6 %
BILIRUB SERPL-MCNC: 0.4 MG/DL (ref 0.2–1.3)
BILIRUB UR QL STRIP: NEGATIVE
BLD GP AB SCN SERPL QL: NORMAL
BLOOD BANK CMNT PATIENT-IMP: NORMAL
BUN SERPL-MCNC: 25 MG/DL (ref 7–30)
CALCIUM SERPL-MCNC: 9.3 MG/DL (ref 8.5–10.1)
CHLORIDE SERPL-SCNC: 104 MMOL/L (ref 94–109)
CO2 SERPL-SCNC: 28 MMOL/L (ref 20–32)
COLOR UR AUTO: YELLOW
CREAT SERPL-MCNC: 1.06 MG/DL (ref 0.52–1.04)
D DIMER PPP FEU-MCNC: 2.2 UG/ML FEU (ref 0–0.5)
DIFFERENTIAL METHOD BLD: NORMAL
EOSINOPHIL # BLD AUTO: 0.1 10E9/L (ref 0–0.7)
EOSINOPHIL NFR BLD AUTO: 1.7 %
ERYTHROCYTE [DISTWIDTH] IN BLOOD BY AUTOMATED COUNT: 14.2 % (ref 10–15)
GFR SERPL CREATININE-BSD FRML MDRD: 44 ML/MIN/{1.73_M2}
GLUCOSE SERPL-MCNC: 105 MG/DL (ref 70–99)
GLUCOSE UR STRIP-MCNC: NEGATIVE MG/DL
HCT VFR BLD AUTO: 37.2 % (ref 35–47)
HGB BLD-MCNC: 11.9 G/DL (ref 11.7–15.7)
HGB UR QL STRIP: NEGATIVE
HYALINE CASTS #/AREA URNS LPF: 6 /LPF (ref 0–2)
IMM GRANULOCYTES # BLD: 0 10E9/L (ref 0–0.4)
IMM GRANULOCYTES NFR BLD: 0.5 %
INTERPRETATION ECG - MUSE: NORMAL
KETONES UR STRIP-MCNC: NEGATIVE MG/DL
LABORATORY COMMENT REPORT: NORMAL
LEUKOCYTE ESTERASE UR QL STRIP: ABNORMAL
LYMPHOCYTES # BLD AUTO: 1 10E9/L (ref 0.8–5.3)
LYMPHOCYTES NFR BLD AUTO: 12.9 %
MAGNESIUM SERPL-MCNC: 2.1 MG/DL (ref 1.6–2.3)
MCH RBC QN AUTO: 31.3 PG (ref 26.5–33)
MCHC RBC AUTO-ENTMCNC: 32 G/DL (ref 31.5–36.5)
MCV RBC AUTO: 98 FL (ref 78–100)
MONOCYTES # BLD AUTO: 1 10E9/L (ref 0–1.3)
MONOCYTES NFR BLD AUTO: 12.2 %
MUCOUS THREADS #/AREA URNS LPF: PRESENT /LPF
NEUTROPHILS # BLD AUTO: 5.7 10E9/L (ref 1.6–8.3)
NEUTROPHILS NFR BLD AUTO: 72.1 %
NITRATE UR QL: POSITIVE
NRBC # BLD AUTO: 0 10*3/UL
NRBC BLD AUTO-RTO: 0 /100
NT-PROBNP SERPL-MCNC: 5456 PG/ML (ref 0–1800)
PH UR STRIP: 5 PH (ref 5–7)
PLATELET # BLD AUTO: 257 10E9/L (ref 150–450)
POTASSIUM SERPL-SCNC: 4 MMOL/L (ref 3.4–5.3)
PROT SERPL-MCNC: 6.8 G/DL (ref 6.8–8.8)
RBC # BLD AUTO: 3.8 10E12/L (ref 3.8–5.2)
RBC #/AREA URNS AUTO: 3 /HPF (ref 0–2)
SARS-COV-2 RNA RESP QL NAA+PROBE: NEGATIVE
SODIUM SERPL-SCNC: 136 MMOL/L (ref 133–144)
SOURCE: ABNORMAL
SP GR UR STRIP: 1.01 (ref 1–1.03)
SPECIMEN EXP DATE BLD: NORMAL
SPECIMEN SOURCE: NORMAL
SQUAMOUS #/AREA URNS AUTO: 2 /HPF (ref 0–1)
TROPONIN I SERPL-MCNC: <0.015 UG/L (ref 0–0.04)
TROPONIN I SERPL-MCNC: <0.015 UG/L (ref 0–0.04)
TSH SERPL DL<=0.005 MIU/L-ACNC: 2.83 MU/L (ref 0.4–4)
UROBILINOGEN UR STRIP-MCNC: 0 MG/DL (ref 0–2)
WBC # BLD AUTO: 7.8 10E9/L (ref 4–11)
WBC #/AREA URNS AUTO: 25 /HPF (ref 0–5)
WBC CLUMPS #/AREA URNS HPF: PRESENT /HPF

## 2021-01-06 PROCEDURE — 87040 BLOOD CULTURE FOR BACTERIA: CPT | Performed by: EMERGENCY MEDICINE

## 2021-01-06 PROCEDURE — 86900 BLOOD TYPING SEROLOGIC ABO: CPT | Performed by: EMERGENCY MEDICINE

## 2021-01-06 PROCEDURE — 83735 ASSAY OF MAGNESIUM: CPT | Performed by: EMERGENCY MEDICINE

## 2021-01-06 PROCEDURE — G0378 HOSPITAL OBSERVATION PER HR: HCPCS

## 2021-01-06 PROCEDURE — 250N000009 HC RX 250: Performed by: EMERGENCY MEDICINE

## 2021-01-06 PROCEDURE — 36415 COLL VENOUS BLD VENIPUNCTURE: CPT

## 2021-01-06 PROCEDURE — 86901 BLOOD TYPING SEROLOGIC RH(D): CPT | Performed by: EMERGENCY MEDICINE

## 2021-01-06 PROCEDURE — 80053 COMPREHEN METABOLIC PANEL: CPT | Performed by: EMERGENCY MEDICINE

## 2021-01-06 PROCEDURE — 87186 SC STD MICRODIL/AGAR DIL: CPT | Performed by: EMERGENCY MEDICINE

## 2021-01-06 PROCEDURE — 250N000011 HC RX IP 250 OP 636: Performed by: EMERGENCY MEDICINE

## 2021-01-06 PROCEDURE — 86850 RBC ANTIBODY SCREEN: CPT | Performed by: EMERGENCY MEDICINE

## 2021-01-06 PROCEDURE — 84443 ASSAY THYROID STIM HORMONE: CPT | Performed by: EMERGENCY MEDICINE

## 2021-01-06 PROCEDURE — 96365 THER/PROPH/DIAG IV INF INIT: CPT | Mod: 59

## 2021-01-06 PROCEDURE — C9803 HOPD COVID-19 SPEC COLLECT: HCPCS

## 2021-01-06 PROCEDURE — 87086 URINE CULTURE/COLONY COUNT: CPT | Performed by: EMERGENCY MEDICINE

## 2021-01-06 PROCEDURE — 99220 PR INITIAL OBSERVATION CARE,LEVEL III: CPT | Performed by: INTERNAL MEDICINE

## 2021-01-06 PROCEDURE — 84484 ASSAY OF TROPONIN QUANT: CPT | Performed by: EMERGENCY MEDICINE

## 2021-01-06 PROCEDURE — 71275 CT ANGIOGRAPHY CHEST: CPT

## 2021-01-06 PROCEDURE — 96375 TX/PRO/DX INJ NEW DRUG ADDON: CPT

## 2021-01-06 PROCEDURE — 85025 COMPLETE CBC W/AUTO DIFF WBC: CPT | Performed by: EMERGENCY MEDICINE

## 2021-01-06 PROCEDURE — 81001 URINALYSIS AUTO W/SCOPE: CPT | Performed by: EMERGENCY MEDICINE

## 2021-01-06 PROCEDURE — 85379 FIBRIN DEGRADATION QUANT: CPT | Performed by: EMERGENCY MEDICINE

## 2021-01-06 PROCEDURE — 93005 ELECTROCARDIOGRAM TRACING: CPT

## 2021-01-06 PROCEDURE — 87088 URINE BACTERIA CULTURE: CPT | Performed by: EMERGENCY MEDICINE

## 2021-01-06 PROCEDURE — 87635 SARS-COV-2 COVID-19 AMP PRB: CPT | Performed by: EMERGENCY MEDICINE

## 2021-01-06 PROCEDURE — 99285 EMERGENCY DEPT VISIT HI MDM: CPT | Mod: 25

## 2021-01-06 PROCEDURE — 83880 ASSAY OF NATRIURETIC PEPTIDE: CPT | Performed by: EMERGENCY MEDICINE

## 2021-01-06 RX ORDER — OMEPRAZOLE 20 MG/1
20 TABLET, DELAYED RELEASE ORAL
COMMUNITY
Start: 2020-05-05

## 2021-01-06 RX ORDER — ATORVASTATIN CALCIUM 10 MG/1
5 TABLET, FILM COATED ORAL DAILY
COMMUNITY
Start: 2020-05-05

## 2021-01-06 RX ORDER — CEFTRIAXONE 1 G/1
1 INJECTION, POWDER, FOR SOLUTION INTRAMUSCULAR; INTRAVENOUS ONCE
Status: COMPLETED | OUTPATIENT
Start: 2021-01-06 | End: 2021-01-06

## 2021-01-06 RX ORDER — SODIUM PHOSPHATE,MONO-DIBASIC 19G-7G/118
1 ENEMA (ML) RECTAL DAILY
COMMUNITY

## 2021-01-06 RX ORDER — CLOPIDOGREL BISULFATE 75 MG/1
75 TABLET ORAL EVERY MORNING
Status: ON HOLD | COMMUNITY
Start: 2020-05-05 | End: 2021-01-09

## 2021-01-06 RX ORDER — IOPAMIDOL 755 MG/ML
55 INJECTION, SOLUTION INTRAVASCULAR ONCE
Status: COMPLETED | OUTPATIENT
Start: 2021-01-06 | End: 2021-01-06

## 2021-01-06 RX ORDER — FUROSEMIDE 10 MG/ML
20 INJECTION INTRAMUSCULAR; INTRAVENOUS ONCE
Status: COMPLETED | OUTPATIENT
Start: 2021-01-06 | End: 2021-01-06

## 2021-01-06 RX ADMIN — FUROSEMIDE 20 MG: 10 INJECTION, SOLUTION INTRAVENOUS at 21:48

## 2021-01-06 RX ADMIN — SODIUM CHLORIDE 82 ML: 9 INJECTION, SOLUTION INTRAVENOUS at 19:20

## 2021-01-06 RX ADMIN — CEFTRIAXONE SODIUM 1 G: 1 INJECTION, POWDER, FOR SOLUTION INTRAMUSCULAR; INTRAVENOUS at 21:09

## 2021-01-06 RX ADMIN — IOPAMIDOL 55 ML: 755 INJECTION, SOLUTION INTRAVENOUS at 19:20

## 2021-01-06 ASSESSMENT — ENCOUNTER SYMPTOMS
FREQUENCY: 0
WEAKNESS: 1
DIFFICULTY URINATING: 0
PALPITATIONS: 0
SHORTNESS OF BREATH: 1

## 2021-01-06 ASSESSMENT — MIFFLIN-ST. JEOR: SCORE: 880.3

## 2021-01-06 NOTE — ED PROVIDER NOTES
History   Chief Complaint:  Shortness of Breath     HPI   Rhea Abad is a 89 year old female with history of hypertension, hypothyroidism, and CVA who presents with shortness of breath and generalized weakness. The patient states that she has been feeling very weak with increasing shortness of breath for the past few weeks, especially upon exertion, prompting her to present to the emergency department. She denies any palpitations, chest pain, urinary symptoms, or any other symptoms. Of note, she is currently taking anticoagulants.    Review of Systems   Respiratory: Positive for shortness of breath.    Cardiovascular: Negative for chest pain and palpitations.   Genitourinary: Negative for difficulty urinating, dyspareunia, frequency and urgency.   Neurological: Positive for weakness.   All other systems reviewed and are negative.        Allergies:  Lisinopril  Amlodipine  Hydrochlorothiazide    Medications:  Tenormin  Lipitor  Fibercon  Celexa  Plavix  Lasix  Apresoline  Levoxyl  Ativan  Cozaar  Prilosec    Past Medical History:    Hypertension  Hypothyroidism   Depression  Chronic peptic ulcer  Prolapse of vaginal wall  Atrial fibrillation  CVA  Hyponatremia     Past Surgical History:    Cataract removal - bilateral  Thyroidectomy  Tonsillectomy  Inguinal hernia repair  Low back surgery  Left breast biopsy     Family History:    Diabetes mellitus - mother  Cerebrovascular disease - father  Lung cancer - brother  Hypertension - brother, sister  Stroke - father    Social History:  Smoking status: Never  Alcohol use: Yes    Physical Exam     Patient Vitals for the past 24 hrs:   BP Temp Temp src Pulse Resp SpO2 Height Weight   01/06/21 1900 (!) 142/102 -- -- 105 12 97 % -- --   01/06/21 1845 (!) 144/103 -- -- 114 21 98 % -- --   01/06/21 1830 123/87 -- -- 111 17 98 % -- --   01/06/21 1815 127/85 -- -- 95 9 98 % -- --   01/06/21 1800 126/81 -- -- 100 13 98 % -- --   01/06/21 1655 118/64 98  F (36.7  C)  "Oral 97 20 92 % 1.6 m (5' 3\") 52.6 kg (116 lb)       Physical Exam  Vitals: reviewed by me  General: Pt seen on hospital [facility], [facility], cooperative, and alert to conversation  Eyes: Tracking well, clear conjunctiva BL  ENT: MMM, midline trachea.   Lungs:   No tachypnea, no accessory muscle use. No respiratory distress.   CV: Rate as above, irregularly irregular rhythm.    Abd: Soft, non tender, no guarding, no rebound. Non distended  MSK: no peripheral edema or joint effusion.  No evidence of trauma  Skin: No rash, normal turgor and temperature  Neuro: Clear speech and no facial droop.  Psych: Not RIS, no e/o AH/VH      Emergency Department Course     ECG:  Indication: Shortness of breath   Time: 1658  Vent. Rate 114 bpm. AL interval *. QRS duration 86. QT/QTc 356/490. P-R-T axis * 68 53.  Atrial fibrillation with rapid ventricular response. Low voltage QRS. Abnormal ECG. Read time: 1703     Imaging:    CT Chest PE with contrast:  1.  No pulmonary emboli.   2.  Small bilateral pleural effusions. Mild interlobular septal   thickening suggestive of pulmonary edema. No pulmonary infiltrates.   3.  Enlargement of the main pulmonary artery compatible with pulmonary   arterial hypertension.   4.  Large left paraesophageal hiatal hernia with an intrathoracic   stomach. This is similar to the radiograph on 11/15/2019. as per radiology.    Imaging independently reviewed and agree with radiologist interpretation.    Laboratory:    CBC: AWNL. (WBC 7.8, HGB 11.9, )     CMP: Glucose 105 (H), Creatinine 1.06 (H), GFR 44 (L), ALT 99 (H), AST 56 (H), o/w WNL     1729 Troponin: <0.015    2012 Troponin: <0.015    BNP: 5,456 (H)    D-dimer: 2.2 (H)    ABO/Rh type and screen: B Positive, Antibody Negative    UA with micro: Nitrite Positive (A), Leukocyte Esterase Large (A), WBC 25 (H), RBC 3 (H), WBC Clumps Present (A), Bacteria Few (A), Squamous Epithelial 2 (H), Mucous Present (A), Hyaline Casts (H), o/w negative    Urine " Culture Aerobic Bacterial: Pending    Blood Culture x2: Pending    COVID-19 Virus PCR: Pending    Emergency Department Course:    Reviewed:  I reviewed nursing notes, vitals, past medical history and care everywhere.    Assessments:  1722 I performed an exam of the patient as documented above.     2046 I rechecked the patient and updated her on the results of her workup thus far.    Consults:   2138  I consulted with Dr. Noe of the hospitalist services. They are in agreement to accept the patient for admission to [place] [facility] [place] for further monitoring, evaluation, and treatment.    Interventions:  2109 Rocephin 1 g IV  2148 Lasix 20 mg IV    Disposition:  The patient was admitted to the hospital under the care of Dr. Noe.     Impression & Plan   Covid-19  Rhea Abad was evaluated during a global COVID-19 pandemic, which necessitated consideration that the patient might be at risk for infection with the SARS-CoV-2 virus that causes COVID-19. Applicable protocols for evaluation were followed during the patient's care.   COVID-19 was considered as part of the patient's evaluation. The plan for testing is:  a test was obtained during this visit.    Medical Decision Making:  Rhea Abad is a very pleasant 89 year old female who presents to the emergency department with what appears to be dyspnea on exertion, likely due to new onset atrial fibrillation. She also has some element of pulmonary edema, and possibly CHF. She has no diagnosis of atrial fibrillation in the past and has a relatively normal echo from 2019. She is on Lasix and I did give her one additional dose here given her dyspnea and light hypoxia. I appreciate her atrial fibrillation, but her rate is consistently below 110. I do not believe that she needs a diltiazem drip and after discussing with the hospitalist, Dr. Noe, he wisely recommends prn's instead. I do think she needs to be admitted, will plan for observation status  under the CSC area. No evidence of ACS, negative troponin x 2, no evidence of pulmonary embolism on the CT scan. Patient is in agreement with this plan and appears pleased with her care.      Diagnosis:    ICD-10-CM    1. Atrial fibrillation, unspecified type (H)  I48.91    2. Dyspnea, unspecified type  R06.00    3. Abnormal finding on urinalysis  R82.90          Scribe Disclosure:  I, Anthony Mcrae, am serving as a scribe at 5:22 PM on 1/6/2021 to document services personally performed by Alan Meredith MD based on my observations and the provider's statements to me.            Alan Meredith MD  01/06/21 [id number]

## 2021-01-07 ENCOUNTER — APPOINTMENT (OUTPATIENT)
Dept: CARDIOLOGY | Facility: CLINIC | Age: 86
End: 2021-01-07
Attending: INTERNAL MEDICINE
Payer: COMMERCIAL

## 2021-01-07 LAB
ALBUMIN SERPL-MCNC: 2.8 G/DL (ref 3.4–5)
ALP SERPL-CCNC: 103 U/L (ref 40–150)
ALT SERPL W P-5'-P-CCNC: 83 U/L (ref 0–50)
ANION GAP SERPL CALCULATED.3IONS-SCNC: 2 MMOL/L (ref 3–14)
AST SERPL W P-5'-P-CCNC: 48 U/L (ref 0–45)
BACTERIA SPEC CULT: ABNORMAL
BILIRUB DIRECT SERPL-MCNC: 0.1 MG/DL (ref 0–0.2)
BILIRUB SERPL-MCNC: 0.6 MG/DL (ref 0.2–1.3)
BUN SERPL-MCNC: 21 MG/DL (ref 7–30)
CALCIUM SERPL-MCNC: 8.7 MG/DL (ref 8.5–10.1)
CHLORIDE SERPL-SCNC: 106 MMOL/L (ref 94–109)
CO2 SERPL-SCNC: 30 MMOL/L (ref 20–32)
CREAT SERPL-MCNC: 1.05 MG/DL (ref 0.52–1.04)
GFR SERPL CREATININE-BSD FRML MDRD: 44 ML/MIN/{1.73_M2}
GLUCOSE SERPL-MCNC: 78 MG/DL (ref 70–99)
Lab: ABNORMAL
POTASSIUM SERPL-SCNC: 3.9 MMOL/L (ref 3.4–5.3)
PROT SERPL-MCNC: 5.6 G/DL (ref 6.8–8.8)
SODIUM SERPL-SCNC: 138 MMOL/L (ref 133–144)
SPECIMEN SOURCE: ABNORMAL

## 2021-01-07 PROCEDURE — 250N000013 HC RX MED GY IP 250 OP 250 PS 637: Performed by: INTERNAL MEDICINE

## 2021-01-07 PROCEDURE — 82565 ASSAY OF CREATININE: CPT | Performed by: INTERNAL MEDICINE

## 2021-01-07 PROCEDURE — 99225 PR SUBSEQUENT OBSERVATION CARE,LEVEL II: CPT | Performed by: INTERNAL MEDICINE

## 2021-01-07 PROCEDURE — 80048 BASIC METABOLIC PNL TOTAL CA: CPT | Performed by: INTERNAL MEDICINE

## 2021-01-07 PROCEDURE — 93306 TTE W/DOPPLER COMPLETE: CPT | Mod: 26 | Performed by: INTERNAL MEDICINE

## 2021-01-07 PROCEDURE — 36415 COLL VENOUS BLD VENIPUNCTURE: CPT | Performed by: INTERNAL MEDICINE

## 2021-01-07 PROCEDURE — G0378 HOSPITAL OBSERVATION PER HR: HCPCS

## 2021-01-07 PROCEDURE — 80076 HEPATIC FUNCTION PANEL: CPT | Performed by: INTERNAL MEDICINE

## 2021-01-07 PROCEDURE — 93306 TTE W/DOPPLER COMPLETE: CPT

## 2021-01-07 RX ORDER — ACETAMINOPHEN 650 MG/1
650 SUPPOSITORY RECTAL EVERY 4 HOURS PRN
Status: DISCONTINUED | OUTPATIENT
Start: 2021-01-07 | End: 2021-01-09 | Stop reason: HOSPADM

## 2021-01-07 RX ORDER — AMOXICILLIN 250 MG
2 CAPSULE ORAL 2 TIMES DAILY PRN
Status: DISCONTINUED | OUTPATIENT
Start: 2021-01-07 | End: 2021-01-09 | Stop reason: HOSPADM

## 2021-01-07 RX ORDER — POLYETHYLENE GLYCOL 3350 17 G/17G
17 POWDER, FOR SOLUTION ORAL DAILY PRN
Status: DISCONTINUED | OUTPATIENT
Start: 2021-01-07 | End: 2021-01-09 | Stop reason: HOSPADM

## 2021-01-07 RX ORDER — LOSARTAN POTASSIUM 100 MG/1
100 TABLET ORAL DAILY
Status: DISCONTINUED | OUTPATIENT
Start: 2021-01-07 | End: 2021-01-09 | Stop reason: HOSPADM

## 2021-01-07 RX ORDER — LEVOTHYROXINE SODIUM 100 UG/1
100 TABLET ORAL DAILY
Status: DISCONTINUED | OUTPATIENT
Start: 2021-01-07 | End: 2021-01-09 | Stop reason: HOSPADM

## 2021-01-07 RX ORDER — ACETAMINOPHEN 325 MG/1
650 TABLET ORAL EVERY 4 HOURS PRN
Status: DISCONTINUED | OUTPATIENT
Start: 2021-01-07 | End: 2021-01-09 | Stop reason: HOSPADM

## 2021-01-07 RX ORDER — FUROSEMIDE 40 MG
40 TABLET ORAL EVERY MORNING
Status: DISCONTINUED | OUTPATIENT
Start: 2021-01-07 | End: 2021-01-09 | Stop reason: HOSPADM

## 2021-01-07 RX ORDER — CITALOPRAM HYDROBROMIDE 20 MG/1
20 TABLET ORAL DAILY
Status: DISCONTINUED | OUTPATIENT
Start: 2021-01-07 | End: 2021-01-09 | Stop reason: HOSPADM

## 2021-01-07 RX ORDER — ONDANSETRON 4 MG/1
4 TABLET, ORALLY DISINTEGRATING ORAL EVERY 6 HOURS PRN
Status: DISCONTINUED | OUTPATIENT
Start: 2021-01-07 | End: 2021-01-09 | Stop reason: HOSPADM

## 2021-01-07 RX ORDER — PROCHLORPERAZINE MALEATE 5 MG
5 TABLET ORAL EVERY 6 HOURS PRN
Status: DISCONTINUED | OUTPATIENT
Start: 2021-01-07 | End: 2021-01-09 | Stop reason: HOSPADM

## 2021-01-07 RX ORDER — PROCHLORPERAZINE 25 MG
12.5 SUPPOSITORY, RECTAL RECTAL EVERY 12 HOURS PRN
Status: DISCONTINUED | OUTPATIENT
Start: 2021-01-07 | End: 2021-01-09 | Stop reason: HOSPADM

## 2021-01-07 RX ORDER — ONDANSETRON 2 MG/ML
4 INJECTION INTRAMUSCULAR; INTRAVENOUS EVERY 6 HOURS PRN
Status: DISCONTINUED | OUTPATIENT
Start: 2021-01-07 | End: 2021-01-09 | Stop reason: HOSPADM

## 2021-01-07 RX ORDER — AMOXICILLIN 250 MG
1 CAPSULE ORAL 2 TIMES DAILY PRN
Status: DISCONTINUED | OUTPATIENT
Start: 2021-01-07 | End: 2021-01-09 | Stop reason: HOSPADM

## 2021-01-07 RX ORDER — DILTIAZEM HYDROCHLORIDE 30 MG/1
30 TABLET, FILM COATED ORAL EVERY 6 HOURS SCHEDULED
Status: DISCONTINUED | OUTPATIENT
Start: 2021-01-07 | End: 2021-01-08

## 2021-01-07 RX ORDER — BISACODYL 10 MG
10 SUPPOSITORY, RECTAL RECTAL DAILY PRN
Status: DISCONTINUED | OUTPATIENT
Start: 2021-01-07 | End: 2021-01-09 | Stop reason: HOSPADM

## 2021-01-07 RX ADMIN — FUROSEMIDE 40 MG: 40 TABLET ORAL at 08:48

## 2021-01-07 RX ADMIN — DILTIAZEM HYDROCHLORIDE 30 MG: 30 TABLET, FILM COATED ORAL at 13:15

## 2021-01-07 RX ADMIN — DILTIAZEM HYDROCHLORIDE 30 MG: 30 TABLET, FILM COATED ORAL at 06:27

## 2021-01-07 RX ADMIN — APIXABAN 2.5 MG: 2.5 TABLET, FILM COATED ORAL at 01:22

## 2021-01-07 RX ADMIN — LEVOTHYROXINE SODIUM 100 MCG: 100 TABLET ORAL at 08:48

## 2021-01-07 RX ADMIN — APIXABAN 2.5 MG: 2.5 TABLET, FILM COATED ORAL at 08:48

## 2021-01-07 RX ADMIN — LOSARTAN POTASSIUM 100 MG: 100 TABLET, FILM COATED ORAL at 08:48

## 2021-01-07 RX ADMIN — CITALOPRAM HYDROBROMIDE 20 MG: 20 TABLET ORAL at 08:49

## 2021-01-07 RX ADMIN — DILTIAZEM HYDROCHLORIDE 30 MG: 30 TABLET, FILM COATED ORAL at 17:33

## 2021-01-07 RX ADMIN — APIXABAN 2.5 MG: 2.5 TABLET, FILM COATED ORAL at 21:02

## 2021-01-07 RX ADMIN — OMEPRAZOLE 20 MG: 20 CAPSULE, DELAYED RELEASE ORAL at 06:26

## 2021-01-07 RX ADMIN — DILTIAZEM HYDROCHLORIDE 30 MG: 30 TABLET, FILM COATED ORAL at 01:22

## 2021-01-07 ASSESSMENT — MIFFLIN-ST. JEOR: SCORE: 895.72

## 2021-01-07 NOTE — ED NOTES
[facility]  ED Nurse Handoff Report    ED Chief complaint: Shortness of Breath      ED Diagnosis:   Final diagnoses:   None       Code Status: Full Code    Allergies:   Allergies   Allergen Reactions     Lisinopril Cough     No Known Drug Allergies        Patient Story: SOB  Focused Assessment:  Fatigue and SOB/EUCEDA x 2-3 weeks. Seen at  today and found new a fib with RVR. Pt also has UTI.     Treatments and/or interventions provided: IVF, Abx  Patient's response to treatments and/or interventions: good    To be done/followed up on inpatient unit:  monitor    Does this patient have any cognitive concerns?: none    Activity level - Baseline/Home:  Independent  Activity Level - Current:   Stand with Assist    Patient's Preferred language: English   Needed?: No    Isolation: None  Infection: Not Applicable  Patient tested for COVID 19 prior to admission: YES  Bariatric?: No    Vital Signs:   Vitals:    01/06/21 1815 01/06/21 1830 01/06/21 1845 01/06/21 1900   BP: 127/85 123/87 (!) 144/103 (!) 142/102   Pulse: 95 111 114 105   Resp: 9 17 21 12   Temp:       TempSrc:       SpO2: 98% 98% 98% 97%   Weight:       Height:           Cardiac Rhythm:Cardiac Rhythm: Atrial fibrillation    Was the PSS-3 completed:   Yes  What interventions are required if any?               Family Comments: none  OBS brochure/video discussed/provided to patient/family: N/A              Name of person given brochure if not patient: NA              Relationship to patient: NA    For the majority of the shift this patient's behavior was Green.   Behavioral interventions performed were none.    ED NURSE PHONE NUMBER: *[phone]

## 2021-01-07 NOTE — PHARMACY-ADMISSION MEDICATION HISTORY
Pharmacy Medication History  Admission medication history interview status for the 1/6/2021  admission is complete. See EPIC admission navigator for prior to admission medications       Medication history sources: Patient and Surescripts  Location of interview: Phone  Adherence Assessment: Good    Significant changes made to the medication list:  Stopped lorazepam  Added glucosamine-chondroitin       Additional medication history information:   none    Medication reconciliation completed by provider prior to medication history? No    Time spent in this activity: 15 minutes      Prior to Admission medications    Medication Sig Last Dose Taking? Auth Provider   ascorbic acid (VITAMIN C) 500 MG CPCR CR capsule Take 500 mg by mouth daily Takes in the afternoon. 1/6/2021 at am Yes Unknown, Entered By History   atenolol (TENORMIN) 50 MG tablet Take 100 mg by mouth 2 times daily Rx written for 100 mg BID. Patient takes 100 mg (2 x 50 mg) in the morning and then checks blood pressure in the afternoon. If SBP >160-180, will take 100 mg (2 x 50 mg) in the evening. If SBP < ~160 will take 50 mg. 1/6/2021 at am Yes Unknown, Entered By History   atorvastatin (LIPITOR) 10 MG tablet Take 5 mg by mouth daily 1/6/2021 at am Yes Unknown, Entered By History   calcium carbonate (CALCIUM CARBONATE) 600 MG tablet Take 600 mg by mouth daily Takes in the afternoon 1/6/2021 at am Yes Unknown, Entered By History   calcium carbonate (TUMS) 500 MG chewable tablet Take 1-2 chew tab by mouth daily as needed for heartburn  at prn Yes Unknown, Entered By History   calcium polycarbophil (FIBERCON) 625 MG tablet Take 2 tablets by mouth daily In the afternoon 1/6/2021 at am Yes Unknown, Entered By History   citalopram (CELEXA) 20 MG tablet Take 20 mg by mouth daily  1/6/2021 at am Yes Unknown, Entered By History   clopidogrel (PLAVIX) 75 MG tablet Take 75 mg by mouth every morning 1/6/2021 at am Yes Unknown, Entered By History   dimenhyDRINATE 50 MG  CHEW Take 50 mg by mouth At Bedtime 1/5/2021 at pm Yes Unknown, Entered By History   furosemide (LASIX) 20 MG tablet Take 20 mg by mouth every morning 1/6/2021 at am Yes Unknown, Entered By History   glucosamine-chondroitin 500-400 MG CAPS per capsule Take 1 capsule by mouth daily 1/6/2021 at am Yes Unknown, Entered By History   hydrALAZINE (APRESOLINE) 25 MG tablet Take 25 mg by mouth 2 times daily Written TID, patient taking BID. 1/6/2021 at am Yes Unknown, Entered By History   levothyroxine (LEVOXYL) 100 MCG tablet Take 1 tablet by mouth daily. 1/6/2021 at am Yes Nazario Strong MD   losartan (COZAAR) 100 MG tablet Take 100 mg by mouth daily  1/6/2021 at am Yes Unknown, Entered By History   Multiple Vitamins-Minerals (ONE-A-DAY WOMENS 50 PLUS PO) Take 1 tablet by mouth daily In the afternoon 1/6/2021 at am Yes Unknown, Entered By History   omeprazole (PRILOSEC OTC) 20 MG EC tablet Take 20 mg by mouth every morning (before breakfast) 1/6/2021 at am Yes Unknown, Entered By History   senna-docusate (SENOKOT-S/PERICOLACE) 8.6-50 MG tablet Take 1 tablet by mouth daily as needed for constipation  at prn Yes Unknown, Entered By History   Tetrahydrozoline HCl (VISINE OP) Place 1 drop into both eyes daily as needed  at prn Yes Unknown, Entered By History       The information provided in this note is only as accurate as the sources available at the time of the update(s).     .Moira Salgado, PharmD  Inpatient Clinical Pharmacist  [phone]

## 2021-01-07 NOTE — PROGRESS NOTES
Met with patient to review Eliquis cost and observation status.  Patient is agreeable to Eliquis cost.     Anticoagulation coverage check.  Patient has Medicare D through [facility] with $225 (of $225) unmet deductible.     Xarelto/Eliquis   January: Upon receipt of RX, Discharge Pharmacy can dispense 1 month free.   February: $270 (fulfills $225 deductible)   Mar-Sept: $45/mo   Oct-Dec: $123/mo (coverage gap     Patient lives alone in her home but does have a daughter that comes to stay with her a couple days per week just to help out around the house.  Patient does not use any assistive devices at baseline.    Vanessa Vaughan RN  Care Coordinator  [facility]  [phone]

## 2021-01-07 NOTE — PLAN OF CARE
Neuro- A/O x4   Most Recent Vitals- Temp: 97.5  F (36.4  C) Temp src: Oral BP: 115/76 Pulse: 81   Resp: 16 SpO2: 94 % O2 Device: None (Room air)    Tele/Cardiac- A-fib w/ CVR rates in 90's-110's, increases w/ activity to 120's.   Resp- room air   Activity- SBA   Pain- denies chest pain, denies SOB   Drips- IV SL   Skin- WDL ex slightly pale but warm   GI/- voiding adequately in BR   Aggression Color- Green  Plan- Echo today, possible HTN medication adjustments.   Misc- UA negative, continue with care plan until discharge.     JOHANNY QUIROS, RN

## 2021-01-07 NOTE — PHARMACY-RX INSURANCE COVERAGE
Anticoagulation coverage check.  Patient has Medicare D through [facility] with $225 (of $225) unmet deductible.    Xarelto/Eliquis   January: Upon receipt of RX, Discharge Pharmacy can dispense 1 month free.   February: $270 (fulfills $225 deductible)   Mar-Sept: $45/mo   Oct-Dec: $123/mo (coverage gap)     Jantoven (warfarin): $7/mo.      -SERINA Monahan, Pharmacy Technician/Liaison, Discharge Pharmacy [phone]

## 2021-01-07 NOTE — PROGRESS NOTES
[facility]    Hospitalist Progress Note    Assessment & Plan   Rhea Abad is a 89 year old female with PMHx of hypertension, hyperlipidemia, hx of CVA, mild HFpEF (presumed diastolic dysfunction) moderate pulmonary hypertension, mild to mod mitral regurgitation, hypothyroidism, anxiety, insomnia and hx of Anant erosions who was admitted under observation status on 1/6/2021 for evaluation of new onset afib after presenting with 2wk hx of palpitation and easy fatigability    Atrial fibrillation, new onset, with mild RVR  Presented with 2 weeks of palpitations, easy fatigability. Found to have atrial fibrillation with mild rapid ventricular response into the 110s. Apparent new onset, though there was some suspicion for underlying atrial fibrillation contributing to her prior stroke with event monitor showing runs of atrial tachycardia. Labs relatively stable this stay. TSH, lytes, Mag nl.     CHADS2 VASc score is at least 6 (age x2, CVA x2, HTN, female). Admitting hospitalist discussed risks/benefits of anticoagulation with patient.  She does have a history of bleeding ulcer several years ago, she was taking NSAIDs at that time and is now on a PPI to this would seem to be a lower bleeding risk now.  She does not otherwise have any falls in the past year and is quite independent despite her age.  She is agreeable to anticoagulation.    -- echocardiogram pending.   -- started on diltiazem 30mg q6h -- HRs improved, will transition to LA formulation at discharge  -- conts on atenolol as per prior to admission  -- started on Eliquis 2.5mg BID     Hypertension  Hyperlipidemia  Home meds: hydralazine 25mg TID, atenolol 100mg BID (may take 50mg in evening if SBP <160), losartan 100mg daily   -- conts on losartan  -- other BP meds held on admission to allow for titration of diltiazem and given admission under observation status  -- holding statin while under observation  status     Congestive heart failure, likely diastolic, mild   Mild to moderate mitral regurgitation  Moderate pulmonary hypertension  Abnl LFTs suspected secondary to hepatic congestion: Improved   Echocardiogram 11/2019 with EF 60 to 65%, mild to moderate MR, moderate pulmonary hypertension.   Mild pulmonary edema noted on CT this stay with trace peripheral edema on exam.    Suspect mild exacerbation precipitated by 2 weeks of atrial fibrillation.  FTs mildly elevated, may be secondary to hepatic congestion in setting of CHF and increased right-sided pressures from pulmonary hypertension.  -- echocardiogram ordered as above  -- given Lasix 20mg IV x1 in ED -- continued 40mg po daily on admission (prior to admission had been taking 20mg daily)    Hx of CVA  Antihypertensives as above  Holding statin while under obs  Plavix stopped given initiation of anticoagulation this stay     Mild acute kidney injury  Baseline creatinine around 0.8.   Creatinine 1.06 on admission, clinically mildly hypervolemic may have cardiorenal component.  -- renal function stable with diuresis this stay  -- conts on losartan  -- monitor daily BMP while hospitalized    Asymptomatic pyuria  UA mildly abnormal with large leukocyte esterase, 25 WBC on contaminated specimen with 2 squamous cells.   Patient is asymptomatic  No treatment indicated presently, UC remains neg thus far     Hiatal hernia with history of Anant erosions  As above, discussed with patient high risk of bleeding on blood thinners given this history. She reports taking NSAIDs at that time of prior bleed, and is now on a PPI.  Hhgb stable and has no clinical evidence of bleeding despite clopidogrel use.  -- conts on PPI as per prior to admission  -- monitor hgb periodically while on anticoagulation  -- advised to avoid NSAID use going forward    Hypothyroidism  Chronic and stable on levothyroxine. TSH nl this stay     Anxiety  Chronic and stable on  citalopram     Insomnia  Takes a shot of vodka nightly to help with sleep.  Reports she has discussed a sleep aid with her primary care provider previously and he is concerned given her age. Admitting hospitalist discussed increased risk of falls with alcohol use, and increased bleeding risk in setting of blood thinners. Recommended cessation of this practice.     FEN: no IVFs, lytes stable, low Na diet  DVT Prophylaxis: on Eliquis as above  Code Status: Full Code    Disposition: Awaiting echocardiogram. Monitor in hospital today to ensure HR/BPs remain stable. Repeat BMP in AM to monitor renal function. Anticipate discharge home tomorrow.     Will update daughter Rose this afternoon.    Stephanie Grider    Interval History   Seen this morning. Feeling okay. No specific complaints. Denies cp/sob/cough, dizziness/lightheadedness. Ambulating okay around room. No abd pain/n/v.     -Data reviewed today: I reviewed all new labs and imaging results over the last 24 hours. I personally reviewed no images or EKG's today.    Physical Exam   Temp: 98.3  F (36.8  C) Temp src: Oral BP: 117/81 Pulse: 88   Resp: 16 SpO2: 95 % O2 Device: None (Room air)    Vitals:    01/06/21 1655 01/07/21 0618   Weight: 52.6 kg (116 lb) 52.6 kg (115 lb 14.4 oz)     Vital Signs with Ranges  Temp:  [97.5  F (36.4  C)-98.3  F (36.8  C)] 98.3  F (36.8  C)  Pulse:  [] 88  Resp:  [9-21] 16  BP: (115-144)/() 117/81  SpO2:  [92 %-98 %] 95 %  No intake/output data recorded.    Constitutional: Resting comfortably, alert and answering questions appropriately, NAD  Respiratory: CTAB, no wheeze/rales/rhonchi, no increased work of breathing  Cardiovascular: HR irregular, no MGR, no LE edema  GI: S, NT, ND, +BS  Skin/Integumen: warm/dry  Other:      Medications     Reason anticoagulant not prescribed for atrial fibrillation         apixaban ANTICOAGULANT  2.5 mg Oral BID     citalopram  20 mg Oral Daily     diltiazem  30 mg Oral Q6H RONAL      furosemide  40 mg Oral QAM     levothyroxine  100 mcg Oral Daily     losartan  100 mg Oral Daily     omeprazole  20 mg Oral QAM AC       Data   Recent Labs   Lab 01/07/21  0554 01/06/21 2012 01/06/21  1729   WBC  --   --  7.8   HGB  --   --  11.9   MCV  --   --  98   PLT  --   --  257     --  136   POTASSIUM 3.9  --  4.0   CHLORIDE 106  --  104   CO2 30  --  28   BUN 21  --  25   CR 1.05*  --  1.06*   ANIONGAP 2*  --  4   GRUPO 8.7  --  9.3   GLC 78  --  105*   ALBUMIN 2.8*  --  3.4   PROTTOTAL 5.6*  --  6.8   BILITOTAL 0.6  --  0.4   ALKPHOS 103  --  126   ALT 83*  --  99*   AST 48*  --  56*   TROPI  --  <0.015 <0.015       Recent Results (from the past 24 hour(s))   CT Chest Pulmonary Embolism w Contrast    Narrative    CT CHEST PULMONARY EMBOLISM W CONTRAST 1/6/2021 7:33 PM    CLINICAL HISTORY: PE suspected, intermediate prob, positive D-dimer  TECHNIQUE: CT angiogram chest during arterial phase injection IV  contrast. 2D and 3D MIP reconstructions were performed by the CT  technologist. Dose reduction techniques were used.     CONTRAST: 56 mL Isovue-[id number]    COMPARISON: Chest x-ray 11/15/2019    FINDINGS:  ANGIOGRAM CHEST: No pulmonary emboli. The main pulmonary artery is  enlarged measuring 3.9 cm compatible with pulmonary arterial  hypertension. No CT evidence of right heart strain.    LUNGS AND PLEURA: Bibasilar dependent atelectasis. Small right and  trace left pleural effusion. Mild interlobular septal thickening  suggestive of pulmonary edema. No infiltrates. No suspicious bony  nodules or masses.    MEDIASTINUM/AXILLAE: Small right hilar lymph nodes. For example an 8  mm right infrahilar lymph node (series 5, image 91), nonspecific and  likely reactive. No pathologically enlarged lymph nodes. Severe  coronary artery calcifications. No significant pericardial effusion.  Large esophageal hiatal hernia with the entire stomach in a  intrathoracic position    UPPER ABDOMEN: Right renal benign cyst requiring  no follow-up.    MUSCULOSKELETAL: Severe degenerative changes in the right shoulder.  Degenerative changes in the spine. No suspicious lesions in the bones.      Impression    IMPRESSION:  1.  No pulmonary emboli.  2.  Small bilateral pleural effusions. Mild interlobular septal  thickening suggestive of pulmonary edema. No pulmonary infiltrates.  3.  Enlargement of the main pulmonary artery compatible with pulmonary  arterial hypertension.  4.  Large left paraesophageal hiatal hernia with an intrathoracic  stomach. This is similar to the radiograph on 11/15/2019.    WILFREDO MCCRACKEN MD

## 2021-01-08 ENCOUNTER — APPOINTMENT (OUTPATIENT)
Dept: PHYSICAL THERAPY | Facility: CLINIC | Age: 86
End: 2021-01-08
Attending: INTERNAL MEDICINE
Payer: COMMERCIAL

## 2021-01-08 LAB
ANION GAP SERPL CALCULATED.3IONS-SCNC: 8 MMOL/L (ref 3–14)
BUN SERPL-MCNC: 21 MG/DL (ref 7–30)
CALCIUM SERPL-MCNC: 9.5 MG/DL (ref 8.5–10.1)
CHLORIDE SERPL-SCNC: 104 MMOL/L (ref 94–109)
CO2 SERPL-SCNC: 29 MMOL/L (ref 20–32)
CREAT SERPL-MCNC: 0.98 MG/DL (ref 0.52–1.04)
GFR SERPL CREATININE-BSD FRML MDRD: 48 ML/MIN/{1.73_M2}
GLUCOSE SERPL-MCNC: 95 MG/DL (ref 70–99)
POTASSIUM SERPL-SCNC: 3.8 MMOL/L (ref 3.4–5.3)
SODIUM SERPL-SCNC: 141 MMOL/L (ref 133–144)

## 2021-01-08 PROCEDURE — G0378 HOSPITAL OBSERVATION PER HR: HCPCS

## 2021-01-08 PROCEDURE — 99204 OFFICE O/P NEW MOD 45 MIN: CPT | Mod: 25 | Performed by: INTERNAL MEDICINE

## 2021-01-08 PROCEDURE — 250N000013 HC RX MED GY IP 250 OP 250 PS 637: Performed by: NURSE PRACTITIONER

## 2021-01-08 PROCEDURE — 97161 PT EVAL LOW COMPLEX 20 MIN: CPT | Mod: GP

## 2021-01-08 PROCEDURE — 99225 PR SUBSEQUENT OBSERVATION CARE,LEVEL II: CPT | Performed by: INTERNAL MEDICINE

## 2021-01-08 PROCEDURE — 36415 COLL VENOUS BLD VENIPUNCTURE: CPT | Performed by: INTERNAL MEDICINE

## 2021-01-08 PROCEDURE — 97116 GAIT TRAINING THERAPY: CPT | Mod: GP

## 2021-01-08 PROCEDURE — 250N000013 HC RX MED GY IP 250 OP 250 PS 637: Performed by: INTERNAL MEDICINE

## 2021-01-08 PROCEDURE — 80048 BASIC METABOLIC PNL TOTAL CA: CPT | Performed by: INTERNAL MEDICINE

## 2021-01-08 RX ORDER — AMOXICILLIN AND CLAVULANATE POTASSIUM 500; 125 MG/1; MG/1
1 TABLET, FILM COATED ORAL EVERY 12 HOURS SCHEDULED
Status: DISCONTINUED | OUTPATIENT
Start: 2021-01-08 | End: 2021-01-09 | Stop reason: HOSPADM

## 2021-01-08 RX ORDER — DILTIAZEM HYDROCHLORIDE 180 MG/1
180 CAPSULE, COATED, EXTENDED RELEASE ORAL DAILY
Status: DISCONTINUED | OUTPATIENT
Start: 2021-01-08 | End: 2021-01-09 | Stop reason: HOSPADM

## 2021-01-08 RX ORDER — METOPROLOL TARTRATE 25 MG/1
25 TABLET, FILM COATED ORAL 2 TIMES DAILY
Status: DISCONTINUED | OUTPATIENT
Start: 2021-01-08 | End: 2021-01-09 | Stop reason: HOSPADM

## 2021-01-08 RX ADMIN — CITALOPRAM HYDROBROMIDE 20 MG: 20 TABLET ORAL at 08:43

## 2021-01-08 RX ADMIN — LOSARTAN POTASSIUM 100 MG: 100 TABLET, FILM COATED ORAL at 08:43

## 2021-01-08 RX ADMIN — OMEPRAZOLE 20 MG: 20 CAPSULE, DELAYED RELEASE ORAL at 06:37

## 2021-01-08 RX ADMIN — METOPROLOL TARTRATE 25 MG: 25 TABLET, FILM COATED ORAL at 13:02

## 2021-01-08 RX ADMIN — APIXABAN 2.5 MG: 2.5 TABLET, FILM COATED ORAL at 08:43

## 2021-01-08 RX ADMIN — Medication 1 MG: at 21:45

## 2021-01-08 RX ADMIN — AMOXICILLIN AND CLAVULANATE POTASSIUM 1 TABLET: 500; 125 TABLET, FILM COATED ORAL at 21:42

## 2021-01-08 RX ADMIN — APIXABAN 2.5 MG: 2.5 TABLET, FILM COATED ORAL at 21:42

## 2021-01-08 RX ADMIN — FUROSEMIDE 40 MG: 40 TABLET ORAL at 08:43

## 2021-01-08 RX ADMIN — METOPROLOL TARTRATE 25 MG: 25 TABLET, FILM COATED ORAL at 21:42

## 2021-01-08 RX ADMIN — LEVOTHYROXINE SODIUM 100 MCG: 100 TABLET ORAL at 08:43

## 2021-01-08 RX ADMIN — DILTIAZEM HYDROCHLORIDE 30 MG: 30 TABLET, FILM COATED ORAL at 05:40

## 2021-01-08 RX ADMIN — DILTIAZEM HYDROCHLORIDE 30 MG: 30 TABLET, FILM COATED ORAL at 00:40

## 2021-01-08 RX ADMIN — AMOXICILLIN AND CLAVULANATE POTASSIUM 1 TABLET: 500; 125 TABLET, FILM COATED ORAL at 10:16

## 2021-01-08 RX ADMIN — DILTIAZEM HYDROCHLORIDE 180 MG: 180 CAPSULE, COATED, EXTENDED RELEASE ORAL at 08:43

## 2021-01-08 ASSESSMENT — MIFFLIN-ST. JEOR: SCORE: 892.54

## 2021-01-08 NOTE — UTILIZATION REVIEW
Concurrent stay review; Secondary Review Determination     [facility]          Under the authority of the Utilization Management Committee, the utilization review process indicated a secondary review on the above patient.  The review outcome is based on review of the medical records, discussions with staff, and applying clinical experience noted on the date of the review.          (x) Observation Status Appropriate - Concurrent stay review    RATIONALE FOR DETERMINATION   89 year old female who has a past medical history significant for hypertension, hyperlipidemia, hx of CVA, mild HFpEF (presumed diastolic dysfunction) moderate pulmonary hypertension, mild to mod mitral regurgitation, hypothyroidism, anxiety, insomnia and hx of Anant erosions. She was admitted on 1/6/2021 with a 2 week history of fatigue, elevated HR and palpations. She was noted to be in atrial fibrillation with RVR. Was seen by cards:Add Metoprolol 25 mg BID-Discharge with 14 day Zio Patch and follow up with cardiology in ~1 month    Patient is clinically improving and there is no clear indication to change patient's status to inpatient. The severity of illness, intensity of service provided, expected LOS and risk for adverse outcome make the care appropriate for observation.      This document was produced using voice recognition software       The information on this document is developed by the utilization review team in order for the business office to ensure compliance.  This only denotes the appropriateness of proper admission status and does not reflect the quality of care rendered.         The definitions of Inpatient Status and Observation Status used in making the determination above are those provided in the CMS Coverage Manual, Chapter 1 and Chapter 6, section 70.4.      Sincerely,     STERLING HIGGINS MD    System Medical Director  Utilization Management  [facility].

## 2021-01-08 NOTE — PROGRESS NOTES
[facility]    Hospitalist Progress Note    Assessment & Plan   Rhea Abad is a 89 year old female with PMHx of hypertension, hyperlipidemia, hx of CVA, mild HFpEF (presumed diastolic dysfunction) moderate pulmonary hypertension, mild to mod mitral regurgitation, hypothyroidism, anxiety, insomnia and hx of Anant erosions who was admitted under observation status on 1/6/2021 for evaluation of new onset afib after presenting with 2wk hx of palpitation and easy fatigability    Atrial fibrillation, new onset, with RVR  Presented with 2 weeks of palpitations, easy fatigability. Found to have atrial fibrillation with mild rapid ventricular response into the 110s. Apparent new onset, though there was some suspicion for underlying atrial fibrillation contributing to her prior stroke with event monitor showing runs of atrial tachycardia. Labs relatively stable this stay. TSH, lytes, Mag nl.     CHADS2 VASc score is at least 6 (age x2, CVA x2, HTN, female). Admitting hospitalist discussed risks/benefits of anticoagulation with patient. She does have a history of bleeding ulcer several years ago, she was taking NSAIDs at that time and is now on a PPI to this would seem to be a lower bleeding risk now. She does not otherwise have any falls in the past year and is quite independent despite her age. She is agreeable to anticoagulation.    Repeat echo this stay showed EF 60-65% with evidence of reduced RVSF and moderate pulmonary hypertension as well as 2+ MR, 1-2+ TR and 1+ OK.      -- transitioned to diltiazem ER 180mg daily on 1/8 AM  -- HRs remain variable -- 80-100s with rest, up to 110s with minimal exertion, patient endorses palpitations better but still feeling fatigued, minimal ambulation this stay  -- have asked cardiology to weigh in regarding further titration of meds -- recs much appreciated  -- ?discharge on cardiac monitor  -- started on Eliquis 2.5mg  BID     Hypertension  Hyperlipidemia  Home meds: hydralazine 25mg TID, atenolol 100mg BID ([id number] in evening if SBP <160), losartan 100mg daily   -- conts on losartan  -- other BP meds held on admission to allow for titration of diltiazem   -- holding statin while under observation status     HFpEF  Mild to moderate mitral regurgitation  Moderate pulmonary hypertension  Abnl LFTs suspected secondary to hepatic congestion: Improved   Echocardiogram 11/2019 with EF 60 to 65%, mild to moderate MR, moderate pulmonary hypertension.   Mild pulmonary edema noted on CT this stay with trace peripheral edema on exam.    Suspect mild exacerbation precipitated by 2 weeks of atrial fibrillation.  FTs mildly elevated, may be secondary to hepatic congestion in setting of CHF and increased right-sided pressures from pulmonary hypertension.  Repeat echocardiogram this stay as above -- EF stable, ongoing reduced RVSF and pulmonary hypertension.   -- cont Lasix 40mg po daily (prior to admission had been taking 20mg daily)    Hx of CVA  Antihypertensives as above  Holding statin while under obs  Plavix stopped given initiation of anticoagulation this stay     Mild MONIK: Resolved  Baseline creatinine around 0.8.   Creatinine 1.06 on admission, clinically mildly hypervolemic may have cardiorenal component.  -- renal function stable with diuresis this stay  -- conts on losartan  -- monitor daily BMP while hospitalized    Uncomplicated UTI dt ecoli  UA mildly abnormal with large leukocyte esterase, 25 WBC on contaminated specimen with 2 squamous cells.   Patient is asymptomatic. UC ultimately grew >100k ecoli  -- given ongoing vague complaints of fatigue, elected to treat with Augmentin, anticipate 3d course of treatment    Hiatal hernia with history of Anant erosions  As above, discussed with patient high risk of bleeding on blood thinners given this history. She reports taking NSAIDs at that time of prior bleed, and is now on a PPI.   "Hhgb stable and has no clinical evidence of bleeding despite clopidogrel use.  -- conts on PPI as per prior to admission  -- monitor hgb periodically while on anticoagulation  -- advised to avoid NSAID use going forward    Hypothyroidism  Chronic and stable on levothyroxine. TSH nl this stay     Anxiety  Chronic and stable on citalopram     Insomnia  Takes a shot of vodka nightly to help with sleep.  Reports she has discussed a sleep aid with her primary care provider previously and he is concerned given her age. Admitting hospitalist discussed increased risk of falls with alcohol use, and increased bleeding risk in setting of blood thinners. Recommended cessation of this practice.     FEN: no IVFs, lytes stable, low Na diet  DVT Prophylaxis: on Eliquis as above  Code Status: Full Code    Disposition: HRs still variable with minimal exertion, will ask cardiology to weigh in. Lives alone -- will place formal PT consult to ensure no concerns/home needs. ?home RN at discharge for BP/HR monitoring. Discharge possibly later today pending additional recommendations, otherwise likely tomorrow.     Will update daughter Rose this afternoon.    Stephanie Grider    Interval History    Seen this morning. Endorses ongoing feelings of fatigue but palpitations improved from admission. Things \"feel a little off\". HRs remain variable, 80-100s at rest, up to 110s with minimal exertion/activity. Hasn't ambulated in hallways yet today. Appetite okay, no abd pain/n/v.     -Data reviewed today: I reviewed all new labs and imaging results over the last 24 hours. I personally reviewed no images or EKG's today.    Physical Exam   Temp: 98.3  F (36.8  C) Temp src: Oral BP: (!) 127/90 Pulse: 79   Resp: 18 SpO2: 94 % O2 Device: None (Room air)    Vitals:    01/06/21 1655 01/07/21 0618 01/08/21 0533   Weight: 52.6 kg (116 lb) 52.6 kg (115 lb 14.4 oz) 52.3 kg (115 lb 3.2 oz)     Vital Signs with Ranges  Temp:  [97.6  F (36.4  C)-98.3  F " (36.8  C)] 98.3  F (36.8  C)  Pulse:  [] 79  Resp:  [14-18] 18  BP: (124-141)/(83-95) 127/90  SpO2:  [94 %-96 %] 94 %  I/O last 3 completed shifts:  In: 372 [P.O.:372]  Out: 75 [Urine:75]    Constitutional: Resting comfortably, alert and answering questions appropriately, NAD  Respiratory: CTAB, no wheeze/rales/rhonchi, no increased work of breathing  Cardiovascular: HR irregular and tachycardic, no MGR, no LE edema  GI: S, NT, ND, +BS  Skin/Integumen: warm/dry  Other:      Medications     Reason anticoagulant not prescribed for atrial fibrillation         amoxicillin-clavulanate  1 tablet Oral Q12H RONAL     apixaban ANTICOAGULANT  2.5 mg Oral BID     citalopram  20 mg Oral Daily     diltiazem ER COATED BEADS  180 mg Oral Daily     furosemide  40 mg Oral QAM     levothyroxine  100 mcg Oral Daily     losartan  100 mg Oral Daily     omeprazole  20 mg Oral QAM AC       Data   Recent Labs   Lab 21  0531 21  0554 21  1729   WBC  --   --   --  7.8   HGB  --   --   --  11.9   MCV  --   --   --  98   PLT  --   --   --  257    138  --  136   POTASSIUM 3.8 3.9  --  4.0   CHLORIDE 104 106  --  104   CO2 29 30  --  28   BUN 21 21  --  25   CR 0.98 1.05*  --  1.06*   ANIONGAP 8 2*  --  4   GRUPO 9.5 8.7  --  9.3   GLC 95 78  --  105*   ALBUMIN  --  2.8*  --  3.4   PROTTOTAL  --  5.6*  --  6.8   BILITOTAL  --  0.6  --  0.4   ALKPHOS  --  103  --  126   ALT  --  83*  --  99*   AST  --  48*  --  56*   TROPI  --   --  <0.015 <0.015       Recent Results (from the past 24 hour(s))   Echocardiogram Complete    Narrative    [id number]  [id number]  [id number]  [id number]^CECI^VANESSA^[facility]  Echocardiography Laboratory  [address] [place] [address]        Name: MODESTA ALEJO  MRN: [id number]  : 10/28/1923  Study Date: 2021 11:23 AM  Age: 89 yrs  Gender: Female  Patient Location: [facility]  Reason For Study: Atrial Fibrillation  Ordering  Physician: VANESSA ORNELAS  Referring Physician: Yoan Saldana  Performed By: Ian Larson RDCS     BSA: 1.5 m2  Height: 63 in  Weight: 116 lb  HR: 114  BP: 117/81 mmHg  _____________________________________________________________________________  __     _____________________________________________________________________________  __        Interpretation Summary     The visual ejection fraction is estimated at 60-65%.  The right ventricle is mild to moderately dilated.  Mildly decreased right ventricular systolic function  The left atrium is moderate to severely dilated.  The right atrium is moderately dilated.  There is moderate (2+) mitral regurgitation.  There is mild to moderate (1-2+) tricuspid regurgitation.  Dilation of the inferior vena cava is present with abnormal respiratory  variation in diameter.  Right ventricular systolic pressure is elevated, consistent with moderate  pulmonary hypertension.  There is mild (1+) pulmonic valvular regurgitation.  The rhythm was atrial fibrillation.  _____________________________________________________________________________  __        Left Ventricle  The left ventricle is normal in size. There is normal left ventricular wall  thickness. Diastolic function not assessed due to atrial fibrillation. The  visual ejection fraction is estimated at 60-65%.     Right Ventricle  The right ventricle is mild to moderately dilated. Mildly decreased right  ventricular systolic function.     Atria  The left atrium is moderate to severely dilated. The right atrium is  moderately dilated.     Mitral Valve  There is moderate mitral annular calcification. The mitral valve leaflets  appear thickened, but open well. There is moderate (2+) mitral regurgitation.  ero 0.17 cm2.        Tricuspid Valve  Normal tricuspid valve. There is mild to moderate (1-2+) tricuspid  regurgitation. Right ventricular systolic pressure is elevated, consistent  with moderate pulmonary hypertension.      Aortic Valve  There is moderate trileaflet aortic sclerosis.     Pulmonic Valve  The pulmonic valve is not well seen, but is grossly normal. There is mild (1+)  pulmonic valvular regurgitation.     Vessels  The aortic root is normal size. Normal size ascending aorta. Dilation of the  inferior vena cava is present with abnormal respiratory variation in diameter.     Pericardium  There is no pericardial effusion.        Rhythm  The rhythm was atrial fibrillation.  _____________________________________________________________________________  __  MMode/2D Measurements & Calculations  IVSd: 1.1 cm     LVIDd: 4.4 cm  LVIDs: 2.9 cm  LVPWd: 0.88 cm  FS: 33.7 %  LV mass(C)d: 149.8 grams  LV mass(C)dI: 97.6 grams/m2  Ao root diam: 2.9 cm  LA dimension: 4.2 cm  asc Aorta Diam: 3.4 cm  LA/Ao: 1.4  LA Volume (BP): 80.0 ml  LA Volume Index (BP): 52.3 ml/m2  RWT: 0.40           Doppler Measurements & Calculations  MV E max calvin: 120.8 cm/sec  MV max P.0 mmHg  MV mean P.6 mmHg  MV V2 VTI: 20.6 cm  MV dec time: 0.15 sec  MR ERO: 0.19 cm2  MR volume: 26.6 ml  PA acc time: 0.12 sec  TR max calvin: 283.9 cm/sec  TR max P.3 mmHg  E/E' avg: 15.2  Lateral E/e': 14.5  Medial E/e': 16.0              _____________________________________________________________________________  __        Report approved by: Kat Dubon 2021 02:00 PM

## 2021-01-08 NOTE — PROGRESS NOTES
01/08/21 1505   Quick Adds   Type of Visit Initial PT Evaluation   Living Environment   People in home alone   Current Living Arrangements house   Home Accessibility stairs within home   Number of Stairs, Within Home, Primary 10   Stair Railings, Within Home, Primary railings on both sides of stairs   Transportation Anticipated family or friend will provide   Living Environment Comments Patient reports her daughter will spent the first few nights at home with her.   Self-Care   Usual Activity Tolerance good   Current Activity Tolerance good   Equipment Currently Used at Home none   Activity/Exercise/Self-Care Comment IND at baseline   Disability/Function   Fall history within last six months no   General Information   Onset of Illness/Injury or Date of Surgery 01/08/21   Referring Physician Stephanie Grider,    Patient/Family Therapy Goals Statement (PT) To return to home later today   Pertinent History of Current Problem (include personal factors and/or comorbidities that impact the POC) per chart: 89 year old female who has a past medical history significant for hypertension, hyperlipidemia, hx of CVA, mild HFpEF (presumed diastolic dysfunction) moderate pulmonary hypertension, mild to mod mitral regurgitation, hypothyroidism, anxiety, insomnia and hx of Anant erosions. She was admitted on 1/6/2021 with a 2 week history of fatigue, elevated HR and palpations. She was noted to be in atrial fibrillation with RVR.   Existing Precautions/Restrictions fall   General Observations Greeted patient sitting up in chair.   Cognition   Orientation Status (Cognition) oriented x 4   Affect/Mental Status (Cognition) WFL   Follows Commands (Cognition) WFL   Pain Assessment   Patient Currently in Pain No   Integumentary/Edema   Integumentary/Edema no deficits were identifed   Posture    Posture Forward head position;Protracted shoulders   Range of Motion (ROM)   ROM Comment B LE WFL   Strength   Strength Comments B  LE WFL   Bed Mobility   Comment (Bed Mobility) did not observe   Transfers   Transfer Safety Comments sit <> stand without assistive device at IND   Gait/Stairs (Locomotion)   Comment (Gait/Stairs) Ambulates 200ft without assistive device at [facility]-IND; no LOB noted. Ascends/descends full flight of steps with single railing using reciprocal pattern at SBA - no LOB.   Balance   Balance Comments no LOB noted during session with functional mobility without use of assistive device   Clinical Impression   Criteria for Skilled Therapeutic Intervention evaluation only   Clinical Presentation Stable/Uncomplicated   Clinical Presentation Rationale PMH, current presentation, clinical judgement   Clinical Decision Making (Complexity) low complexity   Risk & Benefits of therapy have been explained evaluation/treatment results reviewed;care plan/treatment goals reviewed;patient   PT Discharge Planning    PT Discharge Recommendation (DC Rec) home with assist   PT Rationale for DC Rec Patient reports near baseline for functional mobility and is SBA-IND with transfers, ambulation & stair negotiation. Patient reports daughter will be staying with her first few nights and assisting with any household tasks.   Therapy Certification   Start of care date 01/08/21   Certification date from 01/08/21   Certification date to 01/08/21   Total Evaluation Time   Total Evaluation Time (Minutes) 15

## 2021-01-08 NOTE — CONSULTS
[facility]  Cardiology Consultation     Date of Admission:  1/6/2021  Primary Cardiologist: New to cardiology  Date of Consult (When I saw the patient): 01/08/21  Reason for consult: Atrial fibrillation with RVR    History of Present Illness   Rhea Abad is a 89 year old female who has a past medical history significant for hypertension, hyperlipidemia, hx of CVA, mild HFpEF (presumed diastolic dysfunction) moderate pulmonary hypertension, mild to mod mitral regurgitation, hypothyroidism, anxiety, insomnia and hx of Anant erosions. She was admitted on 1/6/2021 with a 2 week history of fatigue, elevated HR and palpations. She was noted to be in atrial fibrillation with RVR.     COVID negative    Assessment:  1. Atrial fibrillation with RVR, newly diagnosed but unsure of chronicity    Post her CVA there was suspicion of underlying atrial fibrillation with monitor noting atrial tachycardia.    CHADS2 VASc 6 (age x2, CVA x2, HTN, female)    Started on diltiazem 30 mg q 6hr and transitioned to 180 mg daily today    Started on Elquis 2.5 mg BID (age >80 and weight 52 kg    Echocardiogram (1/7): EF 60-65% with evidence of reduced RVSF and moderate pulmonary hypertension. LA moderately to severely dilated and RA moderately dilated. 2+ MR, 1-2+ TR and 1+ ID.      2. HFpEF    [PTA: lasix 20 mg daily]    Increased to 40 mg daily    3. History of GI bleed    PPI    4. CVA    [PTA: Plavix]    Negative bubble in 11/2019    5. Hypertension    [PTA: hydralazine 25mg TID, atenolol 100mg BID, losartan 100mg daily]    6. Hyperlipidemia     [PTA: Atorvastatin 10 mg daily]    7. UTI    Plan:   1. Add Metoprolol 25 mg BID  2. Discharge with 14 day Zio Patch and follow up with cardiology in ~1 month.  --------------------------------------------------------------------------------------------    ATTESTATION:  Ms. Abad was seen and examined. Agree with note and plan of care.  SERINA Hagan  MD     Code Status    Full Code    Past Medical History   I have reviewed this patient's medical history and updated it with pertinent information if needed.   Past Medical History:   Diagnosis Date     Chronic or unspecified peptic ulcer, unspecified site, with hemorrhage, without mention of obstruction 01/01/1984     Essential hypertension, benign      GLUCOSE INTOLERANCE 11/01/2001     Mitral regurgitation     Mild to moderate, echocardiogram 11/2019     Moderate pulmonary arterial systolic hypertension (H)      Unspecified hypothyroidism        Past Surgical History   I have reviewed this patient's surgical history and updated it with pertinent information if needed.  Past Surgical History:   Procedure Laterality Date     C REMV CATARACT INTRACAP,INSERT LENS  06/01/1989    L cataract extraction     C REMV CATARACT INTRACAP,INSERT LENS  1987,1994    cataract surgeries     C STEREOTACTIC BREAST BIOPSY  01/02/98    left breast ster. core needle biopsy     ESOPHAGOSCOPY, GASTROSCOPY, DUODENOSCOPY (EGD), COMBINED N/A 3/18/2016    Procedure: COMBINED ESOPHAGOSCOPY, GASTROSCOPY, DUODENOSCOPY (EGD);  Surgeon: Tray Davis MD;  Location:  GI     ESOPHAGOSCOPY, GASTROSCOPY, DUODENOSCOPY (EGD), COMBINED N/A 3/19/2016    Procedure: COMBINED ESOPHAGOSCOPY, GASTROSCOPY, DUODENOSCOPY (EGD);  Surgeon: Reyna Carrero MD;  Location:  GI     HC BLOOD TRANSFUSION SERVICE  1960's    bleeding ulcer requiring transfusion     HC THYROIDECTOMY  1966    throidectomy     HC UPPER GI W/O KUB  6/19/2001    Upper GI- hiatal hernia     HERNIA REPAIR         Prior to Admission Medications   Prior to Admission Medications   Prescriptions Last Dose Informant Patient Reported? Taking?   Multiple Vitamins-Minerals (ONE-A-DAY WOMENS 50 PLUS PO) 1/6/2021 at am Self Yes Yes   Sig: Take 1 tablet by mouth daily In the afternoon   Tetrahydrozoline HCl (VISINE OP)  at prn Self Yes Yes   Sig: Place 1 drop into both eyes daily as  needed   ascorbic acid (VITAMIN C) 500 MG CPCR CR capsule 1/6/2021 at am Self Yes Yes   Sig: Take 500 mg by mouth daily Takes in the afternoon.   atenolol (TENORMIN) 50 MG tablet 1/6/2021 at am Self Yes Yes   Sig: Take 100 mg by mouth 2 times daily Rx written for 100 mg BID. Patient takes 100 mg (2 x 50 mg) in the morning and then checks blood pressure in the afternoon. If SBP >160-180, will take 100 mg (2 x 50 mg) in the evening. If SBP < ~160 will take 50 mg.   atorvastatin (LIPITOR) 10 MG tablet 1/6/2021 at am  Yes Yes   Sig: Take 5 mg by mouth daily   calcium carbonate (CALCIUM CARBONATE) 600 MG tablet 1/6/2021 at am Self Yes Yes   Sig: Take 600 mg by mouth daily Takes in the afternoon   calcium carbonate (TUMS) 500 MG chewable tablet  at prn Self Yes Yes   Sig: Take 1-2 chew tab by mouth daily as needed for heartburn   calcium polycarbophil (FIBERCON) 625 MG tablet 1/6/2021 at am Self Yes Yes   Sig: Take 2 tablets by mouth daily In the afternoon   citalopram (CELEXA) 20 MG tablet 1/6/2021 at am Self Yes Yes   Sig: Take 20 mg by mouth daily    clopidogrel (PLAVIX) 75 MG tablet 1/6/2021 at am  Yes Yes   Sig: Take 75 mg by mouth every morning   dimenhyDRINATE 50 MG CHEW 1/5/2021 at pm Self Yes Yes   Sig: Take 50 mg by mouth At Bedtime   furosemide (LASIX) 20 MG tablet 1/6/2021 at am Self Yes Yes   Sig: Take 20 mg by mouth every morning   glucosamine-chondroitin 500-400 MG CAPS per capsule 1/6/2021 at am  Yes Yes   Sig: Take 1 capsule by mouth daily   hydrALAZINE (APRESOLINE) 25 MG tablet 1/6/2021 at am Self Yes Yes   Sig: Take 25 mg by mouth 2 times daily Written TID, patient taking BID.   levothyroxine (LEVOXYL) 100 MCG tablet 1/6/2021 at am Self No Yes   Sig: Take 1 tablet by mouth daily.   losartan (COZAAR) 100 MG tablet 1/6/2021 at am Self Yes Yes   Sig: Take 100 mg by mouth daily    omeprazole (PRILOSEC OTC) 20 MG EC tablet 1/6/2021 at am  Yes Yes   Sig: Take 20 mg by mouth every morning (before breakfast)    senna-docusate (SENOKOT-S/PERICOLACE) 8.6-50 MG tablet  at prn Self Yes Yes   Sig: Take 1 tablet by mouth daily as needed for constipation      Facility-Administered Medications: None     Allergies   Allergies   Allergen Reactions     Lisinopril Cough     No Known Drug Allergies        Social History   I have reviewed this patient's social history and updated it with pertinent information if needed. Rhea Abad  reports that she has never smoked. She has never used smokeless tobacco. She reports current alcohol use. She reports that she does not use drugs.    Family History   I have reviewed this patient's family history and updated it with pertinent information if needed.   Family History   Problem Relation Age of Onset     Diabetes Mother              Cerebrovascular Disease Father              Cancer Brother          - lung     Hypertension Sister      Hypertension Brother      Breast Cancer Maternal Grandmother         DIABETES MELLITUS       --------------------------------------------------------------------------------------------    Review of Systems   The 10 point Review of Systems is negative other than noted in the HPI.    Temp:  [97.6  F (36.4  C)-98.3  F (36.8  C)] 98.3  F (36.8  C)  Pulse:  [] 79  Resp:  [14-18] 18  BP: (124-141)/(83-95) 127/90  SpO2:  [94 %-96 %] 94 %  115 lbs 3.2 oz    Constitutional:   NAD   Skin:   Warm and dry   Head:   Nontraumatic   Neck:   no JVD   Lungs:   symmetric, clear to auscultation   Cardiovascular:   irregularly irregular rhythm and normal S1 and S2   Abdomen:   Benign   Extremities and Back:   No edema   Neurological:   Grossly nonfocal   Data   Recent Labs   Lab Test 21  1729 11/15/19  1639   WBC 7.8 6.8   HGB 11.9 12.9   MCV 98 97    282      Recent Labs   Lab Test 21  0531 21  0554    138   POTASSIUM 3.8 3.9   CHLORIDE 104 106   BUN    CR 0.98 1.05*     Recent Labs   Lab 21  0531  01/07/21  0554 01/06/21  1729   GLC 95 78 105*     Recent Labs   Lab Test 01/07/21  0554 01/06/21  1729   ALT 83* 99*   AST 48* 56*     Troponin I ES   Date Value Ref Range Status   01/06/2021 <0.015 0.000 - 0.045 ug/L Final     Comment:     The 99th percentile for upper reference range is 0.045 ug/L.  Troponin values   in the range of 0.045 - 0.120 ug/L may be associated with risks of adverse   clinical events.     01/06/2021 <0.015 0.000 - 0.045 ug/L Final     Comment:     The 99th percentile for upper reference range is 0.045 ug/L.  Troponin values   in the range of 0.045 - 0.120 ug/L may be associated with risks of adverse   clinical events.     11/16/2019 <0.015 0.000 - 0.045 ug/L Final     Comment:     The 99th percentile for upper reference range is 0.045 ug/L.  Troponin values   in the range of 0.045 - 0.120 ug/L may be associated with risks of adverse   clinical events.     09/25/2018 0.045 0.000 - 0.045 ug/L Final     Comment:     The 99th percentile for upper reference range is 0.045 ug/L.  Troponin values   in the range of 0.045 - 0.120 ug/L may be associated with risks of adverse   clinical events.     09/22/2018 <0.015 0.000 - 0.045 ug/L Final     Comment:     The 99th percentile for upper reference range is 0.045 ug/L.  Troponin values   in the range of 0.045 - 0.120 ug/L may be associated with risks of adverse   clinical events.         Recent Labs   Lab Test 01/06/21  2012   MAG 2.1       Lab Results   Component Value Date    CHOL 155 11/16/2019     Lab Results   Component Value Date    HDL 57 11/16/2019     Lab Results   Component Value Date    LDL 83 11/16/2019     Lab Results   Component Value Date    TRIG 77 11/16/2019     Lab Results   Component Value Date    CHOLHDLRATIO 4 02/05/2004          TSH   Date Value Ref Range Status   01/06/2021 2.83 0.40 - 4.00 mU/L Final

## 2021-01-08 NOTE — PLAN OF CARE
Pt up in chair and ambulating in halls with SBA. HR  at rest/ 100-115 with activity. Pt receiving PO Cardizem. Denies complaints. Plan possible discharge if HR remains stable.

## 2021-01-08 NOTE — PLAN OF CARE
A&OX4, on RA,  BP elevated 141/83, HR in the 100s, cardizem po given.  Tele- AFib CVR-RVR. Up with SBA, on 2 gm Na.

## 2021-01-08 NOTE — PLAN OF CARE
Patient is up in the room independent tolerated food, denies pain Blood pressure is 120's heart rate 80 to 90 at rest after metoprolol. Possible discharge home tomorrow.

## 2021-01-09 ENCOUNTER — APPOINTMENT (OUTPATIENT)
Dept: CARDIOLOGY | Facility: CLINIC | Age: 86
End: 2021-01-09
Attending: INTERNAL MEDICINE
Payer: COMMERCIAL

## 2021-01-09 VITALS
SYSTOLIC BLOOD PRESSURE: 141 MMHG | RESPIRATION RATE: 16 BRPM | DIASTOLIC BLOOD PRESSURE: 88 MMHG | BODY MASS INDEX: 19.34 KG/M2 | HEIGHT: 64 IN | TEMPERATURE: 98.6 F | OXYGEN SATURATION: 94 % | WEIGHT: 113.3 LBS | HEART RATE: 105 BPM

## 2021-01-09 LAB
ANION GAP SERPL CALCULATED.3IONS-SCNC: 5 MMOL/L (ref 3–14)
BUN SERPL-MCNC: 15 MG/DL (ref 7–30)
CALCIUM SERPL-MCNC: 9.3 MG/DL (ref 8.5–10.1)
CHLORIDE SERPL-SCNC: 103 MMOL/L (ref 94–109)
CO2 SERPL-SCNC: 30 MMOL/L (ref 20–32)
CREAT SERPL-MCNC: 0.86 MG/DL (ref 0.52–1.04)
GFR SERPL CREATININE-BSD FRML MDRD: 56 ML/MIN/{1.73_M2}
GLUCOSE SERPL-MCNC: 90 MG/DL (ref 70–99)
POTASSIUM SERPL-SCNC: 3.6 MMOL/L (ref 3.4–5.3)
SODIUM SERPL-SCNC: 138 MMOL/L (ref 133–144)

## 2021-01-09 PROCEDURE — G0378 HOSPITAL OBSERVATION PER HR: HCPCS

## 2021-01-09 PROCEDURE — 250N000013 HC RX MED GY IP 250 OP 250 PS 637: Performed by: INTERNAL MEDICINE

## 2021-01-09 PROCEDURE — 99217 PR OBSERVATION CARE DISCHARGE: CPT | Performed by: INTERNAL MEDICINE

## 2021-01-09 PROCEDURE — 93248 EXT ECG>7D<15D REV&INTERPJ: CPT | Performed by: INTERNAL MEDICINE

## 2021-01-09 PROCEDURE — 36415 COLL VENOUS BLD VENIPUNCTURE: CPT | Performed by: INTERNAL MEDICINE

## 2021-01-09 PROCEDURE — 80048 BASIC METABOLIC PNL TOTAL CA: CPT | Performed by: INTERNAL MEDICINE

## 2021-01-09 PROCEDURE — 250N000013 HC RX MED GY IP 250 OP 250 PS 637: Performed by: NURSE PRACTITIONER

## 2021-01-09 PROCEDURE — 93246 EXT ECG>7D<15D RECORDING: CPT

## 2021-01-09 RX ORDER — DILTIAZEM HYDROCHLORIDE 180 MG/1
180 CAPSULE, COATED, EXTENDED RELEASE ORAL DAILY
Qty: 30 CAPSULE | Refills: 0 | Status: SHIPPED | OUTPATIENT
Start: 2021-01-09 | End: 2021-01-09

## 2021-01-09 RX ORDER — DILTIAZEM HYDROCHLORIDE 180 MG/1
180 CAPSULE, COATED, EXTENDED RELEASE ORAL DAILY
Qty: 30 CAPSULE | Refills: 0 | Status: SHIPPED | OUTPATIENT
Start: 2021-01-09 | End: 2021-03-16

## 2021-01-09 RX ORDER — AMOXICILLIN AND CLAVULANATE POTASSIUM 500; 125 MG/1; MG/1
1 TABLET, FILM COATED ORAL EVERY 12 HOURS
Qty: 3 TABLET | Refills: 0 | Status: SHIPPED | OUTPATIENT
Start: 2021-01-09 | End: 2021-01-09

## 2021-01-09 RX ORDER — AMOXICILLIN AND CLAVULANATE POTASSIUM 500; 125 MG/1; MG/1
1 TABLET, FILM COATED ORAL EVERY 12 HOURS
Qty: 3 TABLET | Refills: 0 | Status: SHIPPED | OUTPATIENT
Start: 2021-01-09 | End: 2021-02-12

## 2021-01-09 RX ORDER — METOPROLOL TARTRATE 25 MG/1
25 TABLET, FILM COATED ORAL 2 TIMES DAILY
Qty: 60 TABLET | Refills: 0 | Status: SHIPPED | OUTPATIENT
Start: 2021-01-09 | End: 2021-02-12

## 2021-01-09 RX ORDER — METOPROLOL TARTRATE 25 MG/1
25 TABLET, FILM COATED ORAL 2 TIMES DAILY
Qty: 60 TABLET | Refills: 0 | Status: SHIPPED | OUTPATIENT
Start: 2021-01-09 | End: 2021-01-09

## 2021-01-09 RX ORDER — FUROSEMIDE 20 MG
40 TABLET ORAL EVERY MORNING
Start: 2021-01-09 | End: 2021-02-17

## 2021-01-09 RX ADMIN — APIXABAN 2.5 MG: 2.5 TABLET, FILM COATED ORAL at 08:36

## 2021-01-09 RX ADMIN — METOPROLOL TARTRATE 25 MG: 25 TABLET, FILM COATED ORAL at 08:36

## 2021-01-09 RX ADMIN — LEVOTHYROXINE SODIUM 100 MCG: 100 TABLET ORAL at 08:36

## 2021-01-09 RX ADMIN — LOSARTAN POTASSIUM 100 MG: 100 TABLET, FILM COATED ORAL at 08:36

## 2021-01-09 RX ADMIN — FUROSEMIDE 40 MG: 40 TABLET ORAL at 08:35

## 2021-01-09 RX ADMIN — OMEPRAZOLE 20 MG: 20 CAPSULE, DELAYED RELEASE ORAL at 08:35

## 2021-01-09 RX ADMIN — AMOXICILLIN AND CLAVULANATE POTASSIUM 1 TABLET: 500; 125 TABLET, FILM COATED ORAL at 08:36

## 2021-01-09 RX ADMIN — CITALOPRAM HYDROBROMIDE 20 MG: 20 TABLET ORAL at 08:35

## 2021-01-09 RX ADMIN — DILTIAZEM HYDROCHLORIDE 180 MG: 180 CAPSULE, COATED, EXTENDED RELEASE ORAL at 08:36

## 2021-01-09 ASSESSMENT — MIFFLIN-ST. JEOR: SCORE: 883.93

## 2021-01-09 NOTE — PLAN OF CARE
Pt slept all night. Vitally stable, denies any chest pain/sob. Tele AF, CVR. Calls appropriately.

## 2021-01-09 NOTE — DISCHARGE SUMMARY
[facility]    Discharge Summary  Hospitalist    Date of Admission:  1/6/2021  Date of Discharge:  1/9/2021  Discharging Provider: Stephanie Grider    Discharge Diagnoses   Atrial fibrillation, new onset, with RVR  Hypertension  Hyperlipidemia  HFpEF  Mild to moderate mitral regurgitation  Moderate pulmonary hypertension  Abnl LFTs suspected secondary to hepatic congestion: Improved   Hx of CVA  Mild MONIK: Resolved  Uncomplicated UTI dt ecoli  Hiatal hernia with history of Anant erosions  Hypothyroidism  Anxiety  Insomnia    History of Present Illness   Rhea Abad is a 89 year old female with PMHx of hypertension, hyperlipidemia, hx of CVA, mild HFpEF (presumed diastolic dysfunction) moderate pulmonary hypertension, mild to mod mitral regurgitation, hypothyroidism, anxiety, insomnia and hx of Anant erosions who was admitted under observation status on 1/6/2021 for evaluation of new onset afib after presenting with 2wk hx of palpitation and easy fatigability    Hospital Course   Rhea Abad was admitted on 1/6/2021.  The following problems were addressed during her hospitalization:    Atrial fibrillation, new onset, with RVR  Presented with 2 weeks of palpitations, easy fatigability. Found to have atrial fibrillation with mild rapid ventricular response into the 110s. Apparent new onset, though there was some suspicion for underlying atrial fibrillation contributing to her prior stroke with event monitor showing runs of atrial tachycardia. Labs relatively stable this stay. TSH, lytes, Mag nl. Started on diltiazem 30mg q6h on admission.      CHADS2 VASc score is at least 6 (age x2, CVA x2, HTN, female). Admitting hospitalist discussed risks/benefits of anticoagulation with patient. She does have a history of bleeding ulcer several years ago, she was taking NSAIDs at that time and is now on a PPI to this would seem to be a lower bleeding risk now. She does not  otherwise have any falls in the past year and is quite independent despite her age. She is agreeable to anticoagulation.     Repeat echo this stay showed EF 60-65% with evidence of reduced RVSF and moderate pulmonary hypertension as well as 2+ MR, 1-2+ TR and 1+ DC.       Transitioned to diltiazem ER 180mg daily. Seen by cardiology on 1/8 as HRs remained variable -- added metoprolol 25mg BID and HRs improved. Will wear 14d cardiac monitor at discharge and follow up with [facility] in clinic. Will cont Eliquis 2.5mg BID started this stay.     Hypertension  Hyperlipidemia  Home meds: hydralazine 25mg TID, atenolol 100mg BID ([id number] in evening if SBP <160), losartan 100mg daily   Conts on losartan  Atenolol and hydralazine were not resumed at discharge given addition of metoprolol and diltiazem as above  Recommended to check BPs 1-2 times per day at home, daughter to help with this.   Conts on statin     HFpEF  Mild to moderate mitral regurgitation  Moderate pulmonary hypertension  Abnl LFTs suspected secondary to hepatic congestion: Improved   Echocardiogram 11/2019 with EF 60 to 65%, mild to moderate MR, moderate pulmonary hypertension.   Mild pulmonary edema noted on CT this stay with trace peripheral edema on exam.    Suspect mild exacerbation precipitated by 2 weeks of atrial fibrillation.  FTs mildly elevated, may be secondary to hepatic congestion in setting of CHF and increased right-sided pressures from pulmonary hypertension.  Repeat echocardiogram this stay as above -- EF stable, ongoing reduced RVSF and pulmonary hypertension.   Had good response to Lasix 40mg po daily this stay -- renal function steadily improved, will cont 40mg po daily at discharge.   Should follow up with PCP in the next week with repeat BMP     Hx of CVA  Antihypertensives adjusted as above  Conts on statin  Plavix stopped given initiation of anticoagulation this stay     Mild MONIK: Resolved  Baseline creatinine around  0.8.   Creatinine 1.06 on admission, clinically mildly hypervolemic may have cardiorenal component.  Cr improved to 0.8 with increased dose of Lasix  Should have repeat BMP at PCP followup.      Uncomplicated UTI dt ecoli  UA mildly abnormal with large leukocyte esterase, 25 WBC on contaminated specimen with 2 squamous cells.   Patient is asymptomatic. UC ultimately grew >100k ecoli  Given ongoing vague complaints of fatigue, elected to treat with 3d course of Augmentin     Hiatal hernia with history of Anant erosions  As above, discussed with patient high risk of bleeding on blood thinners given this history. She reports taking NSAIDs at that time of prior bleed, and is now on a PPI.  Hhgb stable and has no clinical evidence of bleeding despite clopidogrel use.  Cont'd on PPI as per prior to admission  Monitor hgb periodically while on anticoagulation  Advised to avoid NSAID use going forward     Hypothyroidism  Chronic and stable on levothyroxine. TSH nl this stay     Anxiety  Chronic and stable on citalopram     Insomnia  Takes a shot of vodka nightly to help with sleep.  Reports she has discussed a sleep aid with her primary care provider previously and he is concerned given her age. Admitting hospitalist discussed increased risk of falls with alcohol use, and increased bleeding risk in setting of blood thinners. Recommended cessation of this practice.     Seen by PT -- no needs identified. Patient still drives so did not qualify for home care. Daughter to assist with care at home as needed but patient noted to be quite independent for her age.    Stephanie Grider, DO    Significant Results and Procedures   Echo results in imaging section below    Pending Results   These results will be followed up by PCP  Unresulted Labs Ordered in the Past 30 Days of this Admission     Date and Time Order Name Status Description    1/6/2021 2035 Blood culture Preliminary     1/6/2021 2035 Blood culture Preliminary      "      Code Status   Full Code       Primary Care Physician   Yoan Simonsingrid    Physical Exam   Temp: 98.3  F (36.8  C) Temp src: Oral BP: 119/73 Pulse: 96   Resp: 16 SpO2: 94 % O2 Device: None (Room air)    Vitals:    01/07/21 0618 01/08/21 0533 01/09/21 0556   Weight: 52.6 kg (115 lb 14.4 oz) 52.3 kg (115 lb 3.2 oz) 51.4 kg (113 lb 4.8 oz)     Vital Signs with Ranges  Temp:  [97.7  F (36.5  C)-98.3  F (36.8  C)] 98.3  F (36.8  C)  Pulse:  [95-96] 96  Resp:  [16] 16  BP: (119-136)/(73-91) 119/73  SpO2:  [94 %-98 %] 94 %  I/O last 3 completed shifts:  In: 200 [P.O.:200]  Out: 400 [Urine:400]    General: Resting comfortably, alert, conversive, NAD  CVS: HR irregular, no MGR, no LE edema  Respiratory: CTAB, no wheeze/rales/rhonchi, no increased work of brething  GI: S, NT, ND, +BS  Skin: Warm/dry  Neuro: CNs 2-12 intact, no focal motor/sensory deficits    Discharge Disposition   Discharged to home  Condition at discharge: Stable    Consultations This Hospital Stay   PHARMACY LIAISON FOR MEDICATION COVERAGE CONSULT  PHYSICAL THERAPY ADULT IP CONSULT  CARDIOLOGY IP CONSULT    Time Spent on this Encounter   IStephanie DO, personally saw the patient today and spent greater than 30 minutes discharging this patient.    Discharge Orders      Discharge Order: F/U with Cardiac  DINESH      Reason for your hospital stay    Management of your abnormal heart rhythm (called atrial fibrillation with rapid ventricular response, or \"afib with RVR\"). You were started on several new medications to help slow your heart rate (called diltiazem and metoprolol), because these new medications can impact your blood pressure, your hydralazine and atenolol were stopped. You were started on a blood thinner called Eliquis to reduce your risk of stroke (which can be increased with afib). You can stop taking your Plavix while you are on Eliquis. Your Lasix dose was increased to better help manage extra fluid and help your " breathing/fatigue. You will continue to wear a cardiac monitor at discharge to monitor your heart rhythm at home and will follow up with a cardiologist in clinic to help determine whether your medications need to be adjusted further.     Follow-up and recommended labs and tests     Follow up with your PCP in the next week with basic labs.   Follow up [facility] in clinic as advised.     Activity    Your activity upon discharge: activity as tolerated     Discharge Instructions    Check your blood pressure and heart rate 1-2 times a day.     Leadless EKG Monitor 3 to 14 Days     Diet    Follow this diet upon discharge: Low salt     Discharge Medications   Current Discharge Medication List      START taking these medications    Details   amoxicillin-clavulanate (AUGMENTIN) 500-125 MG tablet Take 1 tablet by mouth every 12 hours  Qty: 3 tablet, Refills: 0    Associated Diagnoses: UTI (urinary tract infection), uncomplicated      apixaban ANTICOAGULANT (ELIQUIS) 2.5 MG tablet Take 1 tablet (2.5 mg) by mouth 2 times daily  Qty: 60 tablet, Refills: 0    Associated Diagnoses: Atrial fibrillation, unspecified type (H)      diltiazem ER COATED BEADS (CARDIZEM CD/CARTIA XT) 180 MG 24 hr capsule Take 1 capsule (180 mg) by mouth daily  Qty: 30 capsule, Refills: 0    Associated Diagnoses: Atrial fibrillation, unspecified type (H)      metoprolol tartrate (LOPRESSOR) 25 MG tablet Take 1 tablet (25 mg) by mouth 2 times daily  Qty: 60 tablet, Refills: 0    Associated Diagnoses: Atrial fibrillation, unspecified type (H)         CONTINUE these medications which have CHANGED    Details   furosemide (LASIX) 20 MG tablet Take 2 tablets (40 mg) by mouth every morning    Associated Diagnoses: Dyspnea, unspecified type         CONTINUE these medications which have NOT CHANGED    Details   ascorbic acid (VITAMIN C) 500 MG CPCR CR capsule Take 500 mg by mouth daily Takes in the afternoon.      atorvastatin (LIPITOR) 10 MG tablet Take 5 mg  by mouth daily      calcium carbonate (CALCIUM CARBONATE) 600 MG tablet Take 600 mg by mouth daily Takes in the afternoon      calcium carbonate (TUMS) 500 MG chewable tablet Take 1-2 chew tab by mouth daily as needed for heartburn      calcium polycarbophil (FIBERCON) 625 MG tablet Take 2 tablets by mouth daily In the afternoon      citalopram (CELEXA) 20 MG tablet Take 20 mg by mouth daily       dimenhyDRINATE 50 MG CHEW Take 50 mg by mouth At Bedtime      glucosamine-chondroitin 500-400 MG CAPS per capsule Take 1 capsule by mouth daily      levothyroxine (LEVOXYL) 100 MCG tablet Take 1 tablet by mouth daily.  Qty: 90 tablet, Refills: 3    Associated Diagnoses: Hypothyroidism      losartan (COZAAR) 100 MG tablet Take 100 mg by mouth daily       Multiple Vitamins-Minerals (ONE-A-DAY WOMENS 50 PLUS PO) Take 1 tablet by mouth daily In the afternoon      omeprazole (PRILOSEC OTC) 20 MG EC tablet Take 20 mg by mouth every morning (before breakfast)      senna-docusate (SENOKOT-S/PERICOLACE) 8.6-50 MG tablet Take 1 tablet by mouth daily as needed for constipation      Tetrahydrozoline HCl (VISINE OP) Place 1 drop into both eyes daily as needed         STOP taking these medications       atenolol (TENORMIN) 50 MG tablet Comments:   Reason for Stopping:         clopidogrel (PLAVIX) 75 MG tablet Comments:   Reason for Stopping:         hydrALAZINE (APRESOLINE) 25 MG tablet Comments:   Reason for Stopping:             Allergies   Allergies   Allergen Reactions     Lisinopril Cough     No Known Drug Allergies      Data   Most Recent 3 CBC's:  Recent Labs   Lab Test 01/06/21  1729 11/15/19  1639 10/05/19  1814   WBC 7.8 6.8 8.3   HGB 11.9 12.9 13.1   MCV 98 97 94    282 352      Most Recent 3 BMP's:  Recent Labs   Lab Test 01/09/21  0552 01/08/21  0531 01/07/21  0554    141 138   POTASSIUM 3.6 3.8 3.9   CHLORIDE 103 104 106   CO2 30 29 30   BUN 15 21 21   CR 0.86 0.98 1.05*   ANIONGAP 5 8 2*   GRUPO 9.3 9.5 8.7    GLC 90 95 78     Most Recent 2 LFT's:  Recent Labs   Lab Test 01/07/21  0554 01/06/21  1729   AST 48* 56*   ALT 83* 99*   ALKPHOS 103 126   BILITOTAL 0.6 0.4     Most Recent 3 Troponin's:  Recent Labs   Lab Test 01/06/21 2012 01/06/21  1729 11/16/19  0551   TROPI <0.015 <0.015 <0.015     Most Recent 6 Bacteria Isolates From Any Culture (See EPIC Reports for Culture Details):  Recent Labs   Lab Test 01/06/21  2107 01/06/21  2042 01/06/21  1945 11/16/19  0530   CULT No growth after 3 days No growth after 3 days >100,000 colonies/mL  Escherichia coli  * >100,000 colonies/mL  Escherichia coli  *     Most Recent TSH, T4 and A1c Labs:  Recent Labs   Lab Test 01/06/21  2012 11/15/19  1639   TSH 2.83  --    A1C  --  5.3     Results for orders placed or performed during the hospital encounter of 01/06/21   CT Chest Pulmonary Embolism w Contrast    Narrative    CT CHEST PULMONARY EMBOLISM W CONTRAST 1/6/2021 7:33 PM    CLINICAL HISTORY: PE suspected, intermediate prob, positive D-dimer  TECHNIQUE: CT angiogram chest during arterial phase injection IV  contrast. 2D and 3D MIP reconstructions were performed by the CT  technologist. Dose reduction techniques were used.     CONTRAST: 56 mL [id number]    COMPARISON: Chest x-ray 11/15/2019    FINDINGS:  ANGIOGRAM CHEST: No pulmonary emboli. The main pulmonary artery is  enlarged measuring 3.9 cm compatible with pulmonary arterial  hypertension. No CT evidence of right heart strain.    LUNGS AND PLEURA: Bibasilar dependent atelectasis. Small right and  trace left pleural effusion. Mild interlobular septal thickening  suggestive of pulmonary edema. No infiltrates. No suspicious bony  nodules or masses.    MEDIASTINUM/AXILLAE: Small right hilar lymph nodes. For example an 8  mm right infrahilar lymph node (series 5, image 91), nonspecific and  likely reactive. No pathologically enlarged lymph nodes. Severe  coronary artery calcifications. No significant pericardial effusion.  Large  esophageal hiatal hernia with the entire stomach in a  intrathoracic position    UPPER ABDOMEN: Right renal benign cyst requiring no follow-up.    MUSCULOSKELETAL: Severe degenerative changes in the right shoulder.  Degenerative changes in the spine. No suspicious lesions in the bones.      Impression    IMPRESSION:  1.  No pulmonary emboli.  2.  Small bilateral pleural effusions. Mild interlobular septal  thickening suggestive of pulmonary edema. No pulmonary infiltrates.  3.  Enlargement of the main pulmonary artery compatible with pulmonary  arterial hypertension.  4.  Large left paraesophageal hiatal hernia with an intrathoracic  stomach. This is similar to the radiograph on 11/15/2019.    WILFREDO MCCRACKEN MD   Echocardiogram Complete    Narrative    [id number]  [id number]  [id number]  [id number]^CECI^VANESSA^[facility]  Echocardiography Laboratory  [address] [place] [address]        Name: MODESTA ALEJO  MRN: [id number]  : 10/28/1923  Study Date: 2021 11:23 AM  Age: 89 yrs  Gender: Female  Patient Location: [facility]  Reason For Study: Atrial Fibrillation  Ordering Physician: VANESSA ORNELAS  Referring Physician: Yoan Saldana  Performed By: Ian Larson RDCS     BSA: 1.5 m2  Height: 63 in  Weight: 116 lb  HR: 114  BP: 117/81 mmHg  ___________________________________________________________________    ___________________________________________________________________     Interpretation Summary     The visual ejection fraction is estimated at 60-65%.  The right ventricle is mild to moderately dilated.  Mildly decreased right ventricular systolic function  The left atrium is moderate to severely dilated.  The right atrium is moderately dilated.  There is moderate (2+) mitral regurgitation.  There is mild to moderate (1-2+) tricuspid regurgitation.  Dilation of the inferior vena cava is present with abnormal respiratory  variation in diameter.  Right ventricular  systolic pressure is elevated, consistent with moderate  pulmonary hypertension.  There is mild (1+) pulmonic valvular regurgitation.  The rhythm was atrial fibrillation.  _____________________________________________________________________________  __        Left Ventricle  The left ventricle is normal in size. There is normal left ventricular wall  thickness. Diastolic function not assessed due to atrial fibrillation. The  visual ejection fraction is estimated at 60-65%.     Right Ventricle  The right ventricle is mild to moderately dilated. Mildly decreased right  ventricular systolic function.     Atria  The left atrium is moderate to severely dilated. The right atrium is  moderately dilated.     Mitral Valve  There is moderate mitral annular calcification. The mitral valve leaflets  appear thickened, but open well. There is moderate (2+) mitral regurgitation.  ero 0.17 cm2.        Tricuspid Valve  Normal tricuspid valve. There is mild to moderate (1-2+) tricuspid  regurgitation. Right ventricular systolic pressure is elevated, consistent  with moderate pulmonary hypertension.     Aortic Valve  There is moderate trileaflet aortic sclerosis.     Pulmonic Valve  The pulmonic valve is not well seen, but is grossly normal. There is mild (1+)  pulmonic valvular regurgitation.     Vessels  The aortic root is normal size. Normal size ascending aorta. Dilation of the  inferior vena cava is present with abnormal respiratory variation in diameter.     Pericardium  There is no pericardial effusion.        Rhythm  The rhythm was atrial fibrillation.  _____________________________________________________________________________  __  MMode/2D Measurements & Calculations  IVSd: 1.1 cm     LVIDd: 4.4 cm  LVIDs: 2.9 cm  LVPWd: 0.88 cm  FS: 33.7 %  LV mass(C)d: 149.8 grams  LV mass(C)dI: 97.6 grams/m2  Ao root diam: 2.9 cm  LA dimension: 4.2 cm  asc Aorta Diam: 3.4 cm  LA/Ao: 1.4  LA Volume (BP): 80.0 ml  LA Volume Index (BP):  52.3 ml/m2  RWT: 0.40       Doppler Measurements & Calculations  MV E max calvin: 120.8 cm/sec  MV max P.0 mmHg  MV mean P.6 mmHg  MV V2 VTI: 20.6 cm  MV dec time: 0.15 sec  MR ERO: 0.19 cm2  MR volume: 26.6 ml  PA acc time: 0.12 sec  TR max calvin: 283.9 cm/sec  TR max P.3 mmHg  E/E' avg: 15.2  Lateral E/e': 14.5  Medial E/e': 16.0      _____________________________________________________________________________     Report approved by: Kat Dubon 2021 02:00 PM

## 2021-01-09 NOTE — PLAN OF CARE
Discharge home with daughter, toleratrd food independent in the room, denies frances. Heart rate 90's.

## 2021-01-09 NOTE — DISCHARGE INSTRUCTIONS
You have a follow-up appointment with Dr. Yoan Saldana on Tuesday, January 12th at 9:40 AM [facility] with a BMP.

## 2021-01-09 NOTE — PROGRESS NOTES
Care Coordination:    -Gave pt a Copay card for Eliquis. Pt filling script at [facility]. Called [email or web address] who has the medication in stock.     Sun Guerin RN, BAN  Inpatient Care Coordination  [address]  [place] [address]  [email or web address]  [email or web address]   Office: [phone]  Fax: [phone]

## 2021-01-12 LAB
BACTERIA SPEC CULT: NO GROWTH
BACTERIA SPEC CULT: NO GROWTH
Lab: NORMAL
Lab: NORMAL
SPECIMEN SOURCE: NORMAL
SPECIMEN SOURCE: NORMAL

## 2021-02-12 ENCOUNTER — OFFICE VISIT (OUTPATIENT)
Dept: CARDIOLOGY | Facility: CLINIC | Age: 86
End: 2021-02-12
Attending: NURSE PRACTITIONER
Payer: COMMERCIAL

## 2021-02-12 VITALS
HEART RATE: 90 BPM | DIASTOLIC BLOOD PRESSURE: 91 MMHG | BODY MASS INDEX: 19.05 KG/M2 | OXYGEN SATURATION: 98 % | WEIGHT: 111 LBS | SYSTOLIC BLOOD PRESSURE: 131 MMHG

## 2021-02-12 DIAGNOSIS — I48.91 ATRIAL FIBRILLATION, UNSPECIFIED TYPE (H): Primary | ICD-10-CM

## 2021-02-12 PROCEDURE — 99214 OFFICE O/P EST MOD 30 MIN: CPT | Performed by: PHYSICIAN ASSISTANT

## 2021-02-12 RX ORDER — METOPROLOL TARTRATE 50 MG
50 TABLET ORAL 2 TIMES DAILY
Qty: 180 TABLET | Refills: 3 | Status: SHIPPED | OUTPATIENT
Start: 2021-02-12 | End: 2021-03-16

## 2021-02-12 NOTE — PATIENT INSTRUCTIONS
Today's Plan:   1) You can take these medications in place of Eliquis: Pradaxa, Xarelto, or Warfarin.   2) Increase metoprolol to 2 tablet (50 mg) two times daily.   3) Come back in 1 month for follow up.   4) Check your pulse and blood pressure daily- if your pulse is below 60 and you feel lightheaded OR if your pulse is above 120 at rest, call us or be seen in the emergency department.    If you have questions or concerns please call my nurse team at [phone].     Scheduling phone number: [phone]  Reminder: Please bring in all current medications, over the counter supplements and vitamin bottles to your next appointment.    It was a pleasure seeing you today!     Efren Vazquez PA-C  2/12/2021

## 2021-02-12 NOTE — PROGRESS NOTES
Primary Cardiologist: Dr. Hagan     Reason for Visit: Hospital follow up    History of Present Illness:   Rhea is a 89 year old female who was admitted last month with a fib RVR. It was not clear if this was a new onset of chronic as patient has no symptoms. Her atenolol was discontinued and she was started on metoprolol and diltiazem for rate control. She was also started on apixaban 2.5 mg BID for CVA prophylaxis as her CHADSVASC score was calculated to be 6. Echocardiogram showed preserved LVEF. LA was moderately to severely dilated.     She also has history of HFpEF (on furosemide 20 mg), CVA x2, hypertension, and hx of GI bleed (on PPI).     She presents to clinic with her daughter, stating she is doing well. She lives by herself. She denies orthopnea, SOB, CP, palpitations, bleeding issues, peripheral edema, abdominal distension, or lightheadedness. She checks her blood pressure and pulse daily and her BP is controlled but her pulse runs in the mid 90's to low 100's.     Assessment and Plan:   Rhea is a pleasant 89 year old female who was admitted last month with atrial fibrillation RVR. She was started on diltiazem, metoprolol tartrate, and apixaban. She was also set up for 14-day ZIOpatch monitor.     She also has history of HFpEF (on furosemide 20 mg), CVA x2, hypertension, and hx of GI bleed (on PPI).     Her monitor showed average HR of 93 bpm. She was in atrial fibrillation during the whole time.     I recommended we further increase metoprolol to 50 mg BID. She will continue to check her BP and pulse. I will have her return in one month with repeat ECG.     I gave names for alternative DOAC's as well as discussed warfarin for CVA prophylaxis. She will look into these.       This note was completed in part using Dragon voice recognition software. Although reviewed after completion, some word and grammatical errors may occur.    Orders this Visit:  Orders Placed This Encounter   Procedures     Follow-Up  with Cardiac Advanced Practice Provider     EKG 12-lead complete w/read - Clinics (to be scheduled)     Orders Placed This Encounter   Medications     metoprolol tartrate (LOPRESSOR) 50 MG tablet     Sig: Take 1 tablet (50 mg) by mouth 2 times daily     Dispense:  180 tablet     Refill:  3     Medications Discontinued During This Encounter   Medication Reason     metoprolol tartrate (LOPRESSOR) 25 MG tablet      amoxicillin-clavulanate (AUGMENTIN) 500-125 MG tablet          Encounter Diagnosis   Name Primary?     Atrial fibrillation, unspecified type (H) Yes       CURRENT MEDICATIONS:  Current Outpatient Medications   Medication Sig Dispense Refill     apixaban ANTICOAGULANT (ELIQUIS) 2.5 MG tablet Take 1 tablet (2.5 mg) by mouth 2 times daily 60 tablet 0     ascorbic acid (VITAMIN C) 500 MG CPCR CR capsule Take 500 mg by mouth daily Takes in the afternoon.       atorvastatin (LIPITOR) 10 MG tablet Take 5 mg by mouth daily       calcium carbonate (CALCIUM CARBONATE) 600 MG tablet Take 600 mg by mouth daily Takes in the afternoon       calcium carbonate (TUMS) 500 MG chewable tablet Take 1-2 chew tab by mouth daily as needed for heartburn       calcium polycarbophil (FIBERCON) 625 MG tablet Take 2 tablets by mouth daily In the afternoon       citalopram (CELEXA) 20 MG tablet Take 20 mg by mouth daily        diltiazem ER COATED BEADS (CARDIZEM CD/CARTIA XT) 180 MG 24 hr capsule Take 1 capsule (180 mg) by mouth daily 30 capsule 0     dimenhyDRINATE 50 MG CHEW Take 50 mg by mouth At Bedtime       furosemide (LASIX) 20 MG tablet Take 2 tablets (40 mg) by mouth every morning       glucosamine-chondroitin 500-400 MG CAPS per capsule Take 1 capsule by mouth daily       levothyroxine (LEVOXYL) 100 MCG tablet Take 1 tablet by mouth daily. 90 tablet 3     losartan (COZAAR) 100 MG tablet Take 100 mg by mouth daily        metoprolol tartrate (LOPRESSOR) 50 MG tablet Take 1 tablet (50 mg) by mouth 2 times daily 180 tablet 3      Multiple Vitamins-Minerals (ONE-A-DAY WOMENS 50 PLUS PO) Take 1 tablet by mouth daily In the afternoon       omeprazole (PRILOSEC OTC) 20 MG EC tablet Take 20 mg by mouth every morning (before breakfast)       senna-docusate (SENOKOT-S/PERICOLACE) 8.6-50 MG tablet Take 1 tablet by mouth daily as needed for constipation       Tetrahydrozoline HCl (VISINE OP) Place 1 drop into both eyes daily as needed         ALLERGIES     Allergies   Allergen Reactions     Lisinopril Cough     No Known Drug Allergies        PAST MEDICAL HISTORY:  Past Medical History:   Diagnosis Date     Chronic or unspecified peptic ulcer, unspecified site, with hemorrhage, without mention of obstruction 01/01/1984     Essential hypertension, benign      GLUCOSE INTOLERANCE 11/01/2001     Mitral regurgitation     Mild to moderate, echocardiogram 11/2019     Moderate pulmonary arterial systolic hypertension (H)      Unspecified hypothyroidism        PAST SURGICAL HISTORY:  Past Surgical History:   Procedure Laterality Date     C REMV CATARACT INTRACAP,INSERT LENS  06/01/1989    L cataract extraction     C REMV CATARACT INTRACAP,INSERT LENS  1987,1994    cataract surgeries     C STEREOTACTIC BREAST BIOPSY  01/02/98    left breast ster. core needle biopsy     ESOPHAGOSCOPY, GASTROSCOPY, DUODENOSCOPY (EGD), COMBINED N/A 3/18/2016    Procedure: COMBINED ESOPHAGOSCOPY, GASTROSCOPY, DUODENOSCOPY (EGD);  Surgeon: Tray Davis MD;  Location:  GI     ESOPHAGOSCOPY, GASTROSCOPY, DUODENOSCOPY (EGD), COMBINED N/A 3/19/2016    Procedure: COMBINED ESOPHAGOSCOPY, GASTROSCOPY, DUODENOSCOPY (EGD);  Surgeon: Reyna Carrero MD;  Location:  GI     HC BLOOD TRANSFUSION SERVICE  1960's    bleeding ulcer requiring transfusion     HC THYROIDECTOMY  1966    throidectomy      UPPER GI W/O KUB  6/19/2001    Upper GI- hiatal hernia     HERNIA REPAIR         FAMILY HISTORY:  Family History   Problem Relation Age of Onset     Diabetes Mother               Cerebrovascular Disease Father              Cancer Brother          - lung     Hypertension Sister      Hypertension Brother      Breast Cancer Maternal Grandmother         DIABETES MELLITUS       SOCIAL HISTORY:  Social History     Socioeconomic History     Marital status:      Spouse name: None     Number of children: 5     Years of education: 12     Highest education level: None   Occupational History     Occupation: retired    Social Needs     Financial resource strain: None     Food insecurity     Worry: None     Inability: None     Transportation needs     Medical: None     Non-medical: None   Tobacco Use     Smoking status: Never Smoker     Smokeless tobacco: Never Used   Substance and Sexual Activity     Alcohol use: Yes     Comment: occasional wine or mixed drink     Drug use: No     Sexual activity: Never   Lifestyle     Physical activity     Days per week: None     Minutes per session: None     Stress: None   Relationships     Social connections     Talks on phone: None     Gets together: None     Attends Restoration service: None     Active member of club or organization: None     Attends meetings of clubs or organizations: None     Relationship status: None     Intimate partner violence     Fear of current or ex partner: None     Emotionally abused: None     Physically abused: None     Forced sexual activity: None   Other Topics Concern     Parent/sibling w/ CABG, MI or angioplasty before 65F 55M? Not Asked   Social History Narrative     None       Review of Systems:  Skin:  Negative     Eyes:  Positive for glasses  ENT:  Positive for hearing loss  Respiratory:  Positive for    Cardiovascular:  Negative;chest pain;lightheadedness;dizziness;edema;palpitations fatigue;Positive for  Gastroenterology: Positive for    Genitourinary:  not assessed    Musculoskeletal:  Negative    Neurologic:  Negative    Psychiatric:  Negative    Heme/Lymph/Imm:  not assessed     Endocrine:  Positive for thyroid disorder    Physical Exam:  Vitals: BP (!) 131/91   Pulse 90   Wt 50.3 kg (111 lb)   SpO2 98%   BMI 19.05 kg/m       GEN:  NAD  NECK: No JVD  C/V:  Irregularly irregular rhythm with normal rate, no murmur, rub or gallop.  RESP: Clear to auscultation bilaterally without wheezing, rales, or rhonchi.  GI: Abdomen soft, nontender, nondistended.   EXTREM: No LE edema.   NEURO: Alert and oriented, cooperative. No obvious focal deficits.   PSYCH: Normal affect.  SKIN: Warm and dry.       Recent Lab Results:  LIPID RESULTS:  Lab Results   Component Value Date    CHOL 155 11/16/2019    HDL 57 11/16/2019    LDL 83 11/16/2019    TRIG 77 11/16/2019    CHOLHDLRATIO 4 02/05/2004       LIVER ENZYME RESULTS:  Lab Results   Component Value Date    AST 48 (H) 01/07/2021    ALT 83 (H) 01/07/2021       CBC RESULTS:  Lab Results   Component Value Date    WBC 7.8 01/06/2021    RBC 3.80 01/06/2021    HGB 11.9 01/06/2021    HCT 37.2 01/06/2021    MCV 98 01/06/2021    MCH 31.3 01/06/2021    MCHC 32.0 01/06/2021    RDW 14.2 01/06/2021     01/06/2021       BMP RESULTS:  Lab Results   Component Value Date     01/09/2021    POTASSIUM 3.6 01/09/2021    CHLORIDE 103 01/09/2021    CO2 30 01/09/2021    ANIONGAP 5 01/09/2021    GLC 90 01/09/2021    BUN 15 01/09/2021    CR 0.86 01/09/2021    GFRESTIMATED 56 (L) 01/09/2021    GFRESTBLACK 65 01/09/2021    GRUPO 9.3 01/09/2021        A1C RESULTS:  Lab Results   Component Value Date    A1C 5.3 11/15/2019       INR RESULTS:  Lab Results   Component Value Date    INR 1.03 04/11/2011           Efren Vazquez PA-C  February 12, 2021

## 2021-02-12 NOTE — LETTER
2/12/2021    Yoan Saldana MD  [address] [facility] [address]    RE: Rhea Abad       Dear Colleague,    I had the pleasure of seeing Rhea Abad in the [facility].    Primary Cardiologist: Dr. Hagan     Reason for Visit: Hospital follow up    History of Present Illness:   Rhea is a 89 year old female who was admitted last month with a fib RVR. It was not clear if this was a new onset of chronic as patient has no symptoms. Her atenolol was discontinued and she was started on metoprolol and diltiazem for rate control. She was also started on apixaban 2.5 mg BID for CVA prophylaxis as her CHADSVASC score was calculated to be 6. Echocardiogram showed preserved LVEF. LA was moderately to severely dilated.     She also has history of HFpEF (on furosemide 20 mg), CVA x2, hypertension, and hx of GI bleed (on PPI).     She presents to clinic with her daughter, stating she is doing well. She lives by herself. She denies orthopnea, SOB, CP, palpitations, bleeding issues, peripheral edema, abdominal distension, or lightheadedness. She checks her blood pressure and pulse daily and her BP is controlled but her pulse runs in the mid 90's to low 100's.     Assessment and Plan:   Rhea is a pleasant 89 year old female who was admitted last month with atrial fibrillation RVR. She was started on diltiazem, metoprolol tartrate, and apixaban. She was also set up for 14-day ZIOpatch monitor.     She also has history of HFpEF (on furosemide 20 mg), CVA x2, hypertension, and hx of GI bleed (on PPI).     Her monitor showed average HR of 93 bpm. She was in atrial fibrillation during the whole time.     I recommended we further increase metoprolol to 50 mg BID. She will continue to check her BP and pulse. I will have her return in one month with repeat ECG.     I gave names for alternative DOAC's as well as discussed warfarin for CVA prophylaxis. She will look  into these.       This note was completed in part using Dragon voice recognition software. Although reviewed after completion, some word and grammatical errors may occur.    Orders this Visit:  Orders Placed This Encounter   Procedures     Follow-Up with Cardiac Advanced Practice Provider     EKG 12-lead complete w/read - Clinics (to be scheduled)     Orders Placed This Encounter   Medications     metoprolol tartrate (LOPRESSOR) 50 MG tablet     Sig: Take 1 tablet (50 mg) by mouth 2 times daily     Dispense:  180 tablet     Refill:  3     Medications Discontinued During This Encounter   Medication Reason     metoprolol tartrate (LOPRESSOR) 25 MG tablet      amoxicillin-clavulanate (AUGMENTIN) 500-125 MG tablet          Encounter Diagnosis   Name Primary?     Atrial fibrillation, unspecified type (H) Yes       CURRENT MEDICATIONS:  Current Outpatient Medications   Medication Sig Dispense Refill     apixaban ANTICOAGULANT (ELIQUIS) 2.5 MG tablet Take 1 tablet (2.5 mg) by mouth 2 times daily 60 tablet 0     ascorbic acid (VITAMIN C) 500 MG CPCR CR capsule Take 500 mg by mouth daily Takes in the afternoon.       atorvastatin (LIPITOR) 10 MG tablet Take 5 mg by mouth daily       calcium carbonate (CALCIUM CARBONATE) 600 MG tablet Take 600 mg by mouth daily Takes in the afternoon       calcium carbonate (TUMS) 500 MG chewable tablet Take 1-2 chew tab by mouth daily as needed for heartburn       calcium polycarbophil (FIBERCON) 625 MG tablet Take 2 tablets by mouth daily In the afternoon       citalopram (CELEXA) 20 MG tablet Take 20 mg by mouth daily        diltiazem ER COATED BEADS (CARDIZEM CD/CARTIA XT) 180 MG 24 hr capsule Take 1 capsule (180 mg) by mouth daily 30 capsule 0     dimenhyDRINATE 50 MG CHEW Take 50 mg by mouth At Bedtime       furosemide (LASIX) 20 MG tablet Take 2 tablets (40 mg) by mouth every morning       glucosamine-chondroitin 500-400 MG CAPS per capsule Take 1 capsule by mouth daily        levothyroxine (LEVOXYL) 100 MCG tablet Take 1 tablet by mouth daily. 90 tablet 3     losartan (COZAAR) 100 MG tablet Take 100 mg by mouth daily        metoprolol tartrate (LOPRESSOR) 50 MG tablet Take 1 tablet (50 mg) by mouth 2 times daily 180 tablet 3     Multiple Vitamins-Minerals (ONE-A-DAY WOMENS 50 PLUS PO) Take 1 tablet by mouth daily In the afternoon       omeprazole (PRILOSEC OTC) 20 MG EC tablet Take 20 mg by mouth every morning (before breakfast)       senna-docusate (SENOKOT-S/PERICOLACE) 8.6-50 MG tablet Take 1 tablet by mouth daily as needed for constipation       Tetrahydrozoline HCl (VISINE OP) Place 1 drop into both eyes daily as needed         ALLERGIES     Allergies   Allergen Reactions     Lisinopril Cough     No Known Drug Allergies        PAST MEDICAL HISTORY:  Past Medical History:   Diagnosis Date     Chronic or unspecified peptic ulcer, unspecified site, with hemorrhage, without mention of obstruction 01/01/1984     Essential hypertension, benign      GLUCOSE INTOLERANCE 11/01/2001     Mitral regurgitation     Mild to moderate, echocardiogram 11/2019     Moderate pulmonary arterial systolic hypertension (H)      Unspecified hypothyroidism        PAST SURGICAL HISTORY:  Past Surgical History:   Procedure Laterality Date     C REMV CATARACT INTRACAP,INSERT LENS  06/01/1989    L cataract extraction     C REMV CATARACT INTRACAP,INSERT LENS  1987,1994    cataract surgeries     C STEREOTACTIC BREAST BIOPSY  01/02/98    left breast ster. core needle biopsy     ESOPHAGOSCOPY, GASTROSCOPY, DUODENOSCOPY (EGD), COMBINED N/A 3/18/2016    Procedure: COMBINED ESOPHAGOSCOPY, GASTROSCOPY, DUODENOSCOPY (EGD);  Surgeon: Tray Davis MD;  Location:  GI     ESOPHAGOSCOPY, GASTROSCOPY, DUODENOSCOPY (EGD), COMBINED N/A 3/19/2016    Procedure: COMBINED ESOPHAGOSCOPY, GASTROSCOPY, DUODENOSCOPY (EGD);  Surgeon: Reyna Carrero MD;  Location:  GI     HC BLOOD TRANSFUSION SERVICE  1960's     bleeding ulcer requiring transfusion      THYROIDECTOMY  1966    throidectomy      UPPER GI W/O KUB  2001    Upper GI- hiatal hernia     HERNIA REPAIR         FAMILY HISTORY:  Family History   Problem Relation Age of Onset     Diabetes Mother              Cerebrovascular Disease Father              Cancer Brother          - lung     Hypertension Sister      Hypertension Brother      Breast Cancer Maternal Grandmother         DIABETES MELLITUS       SOCIAL HISTORY:  Social History     Socioeconomic History     Marital status:      Spouse name: None     Number of children: 5     Years of education: 12     Highest education level: None   Occupational History     Occupation: retired    Social Needs     Financial resource strain: None     Food insecurity     Worry: None     Inability: None     Transportation needs     Medical: None     Non-medical: None   Tobacco Use     Smoking status: Never Smoker     Smokeless tobacco: Never Used   Substance and Sexual Activity     Alcohol use: Yes     Comment: occasional wine or mixed drink     Drug use: No     Sexual activity: Never   Lifestyle     Physical activity     Days per week: None     Minutes per session: None     Stress: None   Relationships     Social connections     Talks on phone: None     Gets together: None     Attends Restorationist service: None     Active member of club or organization: None     Attends meetings of clubs or organizations: None     Relationship status: None     Intimate partner violence     Fear of current or ex partner: None     Emotionally abused: None     Physically abused: None     Forced sexual activity: None   Other Topics Concern     Parent/sibling w/ CABG, MI or angioplasty before 65F 55M? Not Asked   Social History Narrative     None       Review of Systems:  Skin:  Negative     Eyes:  Positive for glasses  ENT:  Positive for hearing loss  Respiratory:  Positive for    Cardiovascular:   Negative;chest pain;lightheadedness;dizziness;edema;palpitations fatigue;Positive for  Gastroenterology: Positive for    Genitourinary:  not assessed    Musculoskeletal:  Negative    Neurologic:  Negative    Psychiatric:  Negative    Heme/Lymph/Imm:  not assessed    Endocrine:  Positive for thyroid disorder    Physical Exam:  Vitals: BP (!) 131/91   Pulse 90   Wt 50.3 kg (111 lb)   SpO2 98%   BMI 19.05 kg/m       GEN:  NAD  NECK: No JVD  C/V:  Irregularly irregular rhythm with normal rate, no murmur, rub or gallop.  RESP: Clear to auscultation bilaterally without wheezing, rales, or rhonchi.  GI: Abdomen soft, nontender, nondistended.   EXTREM: No LE edema.   NEURO: Alert and oriented, cooperative. No obvious focal deficits.   PSYCH: Normal affect.  SKIN: Warm and dry.       Recent Lab Results:  LIPID RESULTS:  Lab Results   Component Value Date    CHOL 155 11/16/2019    HDL 57 11/16/2019    LDL 83 11/16/2019    TRIG 77 11/16/2019    CHOLHDLRATIO 4 02/05/2004       LIVER ENZYME RESULTS:  Lab Results   Component Value Date    AST 48 (H) 01/07/2021    ALT 83 (H) 01/07/2021       CBC RESULTS:  Lab Results   Component Value Date    WBC 7.8 01/06/2021    RBC 3.80 01/06/2021    HGB 11.9 01/06/2021    HCT 37.2 01/06/2021    MCV 98 01/06/2021    MCH 31.3 01/06/2021    MCHC 32.0 01/06/2021    RDW 14.2 01/06/2021     01/06/2021       BMP RESULTS:  Lab Results   Component Value Date     01/09/2021    POTASSIUM 3.6 01/09/2021    CHLORIDE 103 01/09/2021    CO2 30 01/09/2021    ANIONGAP 5 01/09/2021    GLC 90 01/09/2021    BUN 15 01/09/2021    CR 0.86 01/09/2021    GFRESTIMATED 56 (L) 01/09/2021    GFRESTBLACK 65 01/09/2021    GRUPO 9.3 01/09/2021        A1C RESULTS:  Lab Results   Component Value Date    A1C 5.3 11/15/2019       INR RESULTS:  Lab Results   Component Value Date    INR 1.03 04/11/2011       Efren Vazquez PA-C  February 12, 2021       Thank you for allowing me to participate in the care of  your patient.    Sincerely,     KRYSTINA [facility]

## 2021-02-15 ENCOUNTER — TELEPHONE (OUTPATIENT)
Dept: CARDIOLOGY | Facility: CLINIC | Age: 86
End: 2021-02-15

## 2021-02-15 DIAGNOSIS — R06.00 DYSPNEA, UNSPECIFIED TYPE: ICD-10-CM

## 2021-02-15 DIAGNOSIS — I10 ESSENTIAL HYPERTENSION, BENIGN: Primary | ICD-10-CM

## 2021-02-15 NOTE — TELEPHONE ENCOUNTER
[facility] called, Patient told them she is taking Furosamide 20 mg 2 tablets daily and requests refill.    Last OV 2-12-21.   Will message Efren

## 2021-02-17 RX ORDER — FUROSEMIDE 20 MG
40 TABLET ORAL EVERY MORNING
Qty: 60 TABLET | Refills: 3 | Status: SHIPPED | OUTPATIENT
Start: 2021-02-17 | End: 2021-04-19

## 2021-02-17 NOTE — TELEPHONE ENCOUNTER
Per Efren's message - Yes, it looks like she has been taking 40 mg daily- continue with this.     Efren

## 2021-03-16 ENCOUNTER — OFFICE VISIT (OUTPATIENT)
Dept: CARDIOLOGY | Facility: CLINIC | Age: 86
End: 2021-03-16
Attending: PHYSICIAN ASSISTANT
Payer: COMMERCIAL

## 2021-03-16 VITALS — HEART RATE: 101 BPM | SYSTOLIC BLOOD PRESSURE: 124 MMHG | DIASTOLIC BLOOD PRESSURE: 84 MMHG

## 2021-03-16 DIAGNOSIS — I10 BENIGN ESSENTIAL HYPERTENSION: Primary | ICD-10-CM

## 2021-03-16 DIAGNOSIS — I48.91 ATRIAL FIBRILLATION, UNSPECIFIED TYPE (H): ICD-10-CM

## 2021-03-16 PROCEDURE — 99214 OFFICE O/P EST MOD 30 MIN: CPT | Performed by: PHYSICIAN ASSISTANT

## 2021-03-16 PROCEDURE — 93000 ELECTROCARDIOGRAM COMPLETE: CPT | Performed by: PHYSICIAN ASSISTANT

## 2021-03-16 RX ORDER — METOPROLOL TARTRATE 25 MG/1
25 TABLET, FILM COATED ORAL 2 TIMES DAILY
Qty: 60 TABLET | Refills: 3 | Status: SHIPPED | OUTPATIENT
Start: 2021-03-16 | End: 2021-03-16

## 2021-03-16 RX ORDER — DILTIAZEM HYDROCHLORIDE 180 MG/1
180 CAPSULE, COATED, EXTENDED RELEASE ORAL DAILY
Qty: 30 CAPSULE | Refills: 3 | Status: SHIPPED | OUTPATIENT
Start: 2021-03-16 | End: 2021-04-15

## 2021-03-16 RX ORDER — ATENOLOL 50 MG/1
50 TABLET ORAL DAILY
Qty: 30 TABLET | Refills: 3 | Status: SHIPPED | OUTPATIENT
Start: 2021-03-16 | End: 2021-03-29

## 2021-03-16 NOTE — LETTER
3/16/2021    Yoan Saldana MD  [address] [place] [id number]    RE: Rhea Abad       Dear Colleague,    I had the pleasure of seeing Rhea Abad in the [facility].    Primary Cardiologist: Dr. Hagan    Reason for Visit: One month follow up after increase in metoprolol for ventricular rate control    History of Present Illness:   Rhea is a 89 year old female who was admitted last month with a fib RVR. It was not clear if this was a new onset of chronic as patient has no symptoms. Her atenolol was discontinued and she was started on metoprolol and diltiazem for rate control. She was also started on apixaban 2.5 mg BID for CVA prophylaxis as her CHADSVASC score was calculated to be 6. Echocardiogram showed preserved LVEF. LA was moderately to severely dilated.      She also has history of HFpEF (on furosemide 20 mg), CVA x2, hypertension, and hx of GI bleed (on PPI).     During last visit I recommended we further increase metoprolol to 50 mg BID and have her return in one month with repeat ECG.     Rhea reports that since the increase in metoprolol she has been having nightmares and very vivid dreams.  She wonders if she can switch back to atenolol.  She does check her blood pressure and pulse regularly and her blood pressure is in the 1 teens to 120s over 60s with pulse in the mid 80s to 90s.    Assessment and Plan:  Rhea is a 89 year old female who was admitted last month with a fib RVR. It was not clear if this was a new onset of chronic as patient has no symptoms. Her atenolol was discontinued and she was started on metoprolol and diltiazem for rate control. She was also started on apixaban 2.5 mg BID for CVA prophylaxis as her CHADSVASC score was calculated to be 6. Echocardiogram showed preserved LVEF. LA was moderately to severely dilated.     After some discussion we decided to switch her back to atenolol.  She will start  taking 50 mg daily.  Depending on her blood pressure and pulse we may further increase atenolol or consider increasing diltiazem instead.  She will monitor her blood pressure and pulse and contact us in 1 to 2 weeks for an update.  I will see her back in 1 month.  Her ECG shows she continues to be in atrial fibrillation with pulse of 90.    This note was completed in part using Dragon voice recognition software. Although reviewed after completion, some word and grammatical errors may occur.    Orders this Visit:  Orders Placed This Encounter   Procedures     Follow-Up with Cardiac Advanced Practice Provider     EKG 12-lead complete w/read - Clinics (performed today)     Orders Placed This Encounter   Medications     DISCONTD: metoprolol tartrate (LOPRESSOR) 25 MG tablet     Sig: Take 1 tablet (25 mg) by mouth 2 times daily     Dispense:  60 tablet     Refill:  3     atenolol (TENORMIN) 50 MG tablet     Sig: Take 1 tablet (50 mg) by mouth daily     Dispense:  30 tablet     Refill:  3     diltiazem ER COATED BEADS (CARDIZEM CD/CARTIA XT) 180 MG 24 hr capsule     Sig: Take 1 capsule (180 mg) by mouth daily     Dispense:  30 capsule     Refill:  3     Medications Discontinued During This Encounter   Medication Reason     diltiazem ER COATED BEADS (CARDIZEM CD/CARTIA XT) 180 MG 24 hr capsule      metoprolol tartrate (LOPRESSOR) 50 MG tablet      metoprolol tartrate (LOPRESSOR) 25 MG tablet      metoprolol tartrate (LOPRESSOR) 25 MG tablet          Encounter Diagnoses   Name Primary?     Atrial fibrillation, unspecified type (H)      Benign essential hypertension Yes       CURRENT MEDICATIONS:  Current Outpatient Medications   Medication Sig Dispense Refill     apixaban ANTICOAGULANT (ELIQUIS) 2.5 MG tablet Take 1 tablet (2.5 mg) by mouth 2 times daily 60 tablet 0     ascorbic acid (VITAMIN C) 500 MG CPCR CR capsule Take 500 mg by mouth daily Takes in the afternoon.       atenolol (TENORMIN) 50 MG tablet Take 1 tablet (50  mg) by mouth daily 30 tablet 3     atorvastatin (LIPITOR) 10 MG tablet Take 5 mg by mouth daily       calcium carbonate (CALCIUM CARBONATE) 600 MG tablet Take 600 mg by mouth daily Takes in the afternoon       calcium carbonate (TUMS) 500 MG chewable tablet Take 1-2 chew tab by mouth daily as needed for heartburn       calcium polycarbophil (FIBERCON) 625 MG tablet Take 2 tablets by mouth daily In the afternoon       citalopram (CELEXA) 20 MG tablet Take 20 mg by mouth daily        diltiazem ER COATED BEADS (CARDIZEM CD/CARTIA XT) 180 MG 24 hr capsule Take 1 capsule (180 mg) by mouth daily 30 capsule 3     dimenhyDRINATE 50 MG CHEW Take 50 mg by mouth At Bedtime       furosemide (LASIX) 20 MG tablet Take 2 tablets (40 mg) by mouth every morning 60 tablet 3     glucosamine-chondroitin 500-400 MG CAPS per capsule Take 1 capsule by mouth daily       levothyroxine (LEVOXYL) 100 MCG tablet Take 1 tablet by mouth daily. 90 tablet 3     losartan (COZAAR) 100 MG tablet Take 100 mg by mouth daily        Multiple Vitamins-Minerals (ONE-A-DAY WOMENS 50 PLUS PO) Take 1 tablet by mouth daily In the afternoon       omeprazole (PRILOSEC OTC) 20 MG EC tablet Take 20 mg by mouth every morning (before breakfast)       senna-docusate (SENOKOT-S/PERICOLACE) 8.6-50 MG tablet Take 1 tablet by mouth daily as needed for constipation       Tetrahydrozoline HCl (VISINE OP) Place 1 drop into both eyes daily as needed         ALLERGIES     Allergies   Allergen Reactions     Lisinopril Cough     No Known Drug Allergies        PAST MEDICAL HISTORY:  Past Medical History:   Diagnosis Date     Chronic or unspecified peptic ulcer, unspecified site, with hemorrhage, without mention of obstruction 01/01/1984     Essential hypertension, benign      GLUCOSE INTOLERANCE 11/01/2001     Mitral regurgitation     Mild to moderate, echocardiogram 11/2019     Moderate pulmonary arterial systolic hypertension (H)      Unspecified hypothyroidism        PAST  SURGICAL HISTORY:  Past Surgical History:   Procedure Laterality Date     C REMV CATARACT INTRACAP,INSERT LENS  1989    L cataract extraction     C REMV CATARACT INTRACAP,INSERT LENS  ,    cataract surgeries     C STEREOTACTIC BREAST BIOPSY  98    left breast ster. core needle biopsy     ESOPHAGOSCOPY, GASTROSCOPY, DUODENOSCOPY (EGD), COMBINED N/A 3/18/2016    Procedure: COMBINED ESOPHAGOSCOPY, GASTROSCOPY, DUODENOSCOPY (EGD);  Surgeon: Tray Davis MD;  Location:  GI     ESOPHAGOSCOPY, GASTROSCOPY, DUODENOSCOPY (EGD), COMBINED N/A 3/19/2016    Procedure: COMBINED ESOPHAGOSCOPY, GASTROSCOPY, DUODENOSCOPY (EGD);  Surgeon: Reyna Carrero MD;  Location:  GI     HC BLOOD TRANSFUSION SERVICE  1960's    bleeding ulcer requiring transfusion     HC THYROIDECTOMY  1966    throidectomy     HC UPPER GI W/O KUB  2001    Upper GI- hiatal hernia     HERNIA REPAIR         FAMILY HISTORY:  Family History   Problem Relation Age of Onset     Diabetes Mother              Cerebrovascular Disease Father              Cancer Brother          - lung     Hypertension Sister      Hypertension Brother      Breast Cancer Maternal Grandmother         DIABETES MELLITUS       SOCIAL HISTORY:  Social History     Socioeconomic History     Marital status:      Spouse name: None     Number of children: 5     Years of education: 12     Highest education level: None   Occupational History     Occupation: retired    Social Needs     Financial resource strain: None     Food insecurity     Worry: None     Inability: None     Transportation needs     Medical: None     Non-medical: None   Tobacco Use     Smoking status: Never Smoker     Smokeless tobacco: Never Used   Substance and Sexual Activity     Alcohol use: Yes     Comment: occasional wine or mixed drink     Drug use: No     Sexual activity: Never   Lifestyle     Physical activity     Days per week: None      Minutes per session: None     Stress: None   Relationships     Social connections     Talks on phone: None     Gets together: None     Attends Baptism service: None     Active member of club or organization: None     Attends meetings of clubs or organizations: None     Relationship status: None     Intimate partner violence     Fear of current or ex partner: None     Emotionally abused: None     Physically abused: None     Forced sexual activity: None   Other Topics Concern     Parent/sibling w/ CABG, MI or angioplasty before 65F 55M? Not Asked   Social History Narrative     None       Review of Systems:  Skin:  Negative     Eyes:  Negative    ENT:  Negative    Respiratory:  Negative    Cardiovascular:    edema;Positive for  Gastroenterology: Negative    Genitourinary:  Negative    Musculoskeletal:  Negative    Neurologic:  Negative    Psychiatric:  Negative    Heme/Lymph/Imm:  Negative    Endocrine:  Positive for thyroid disorder    Physical Exam:  Vitals: /84   Pulse 101      GEN:  NAD  NECK: No JVD  C/V: Irregularly irregular rhythm with normal rate, no murmur, rub or gallop.  RESP: Clear to auscultation bilaterally without wheezing, rales, or rhonchi.  GI: Abdomen soft, nontender, nondistended. No HSM appreciated.   EXTREM: No LE edema.   NEURO: Alert and oriented, cooperative. No obvious focal deficits.   PSYCH: Normal affect.  SKIN: Warm and dry.       Recent Lab Results:  LIPID RESULTS:  Lab Results   Component Value Date    CHOL 155 11/16/2019    HDL 57 11/16/2019    LDL 83 11/16/2019    TRIG 77 11/16/2019    CHOLHDLRATIO 4 02/05/2004       LIVER ENZYME RESULTS:  Lab Results   Component Value Date    AST 48 (H) 01/07/2021    ALT 83 (H) 01/07/2021       CBC RESULTS:  Lab Results   Component Value Date    WBC 7.8 01/06/2021    RBC 3.80 01/06/2021    HGB 11.9 01/06/2021    HCT 37.2 01/06/2021    MCV 98 01/06/2021    MCH 31.3 01/06/2021    MCHC 32.0 01/06/2021    RDW 14.2 01/06/2021      01/06/2021       BMP RESULTS:  Lab Results   Component Value Date     01/09/2021    POTASSIUM 3.6 01/09/2021    CHLORIDE 103 01/09/2021    CO2 30 01/09/2021    ANIONGAP 5 01/09/2021    GLC 90 01/09/2021    BUN 15 01/09/2021    CR 0.86 01/09/2021    GFRESTIMATED 56 (L) 01/09/2021    GFRESTBLACK 65 01/09/2021    GRUPO 9.3 01/09/2021        A1C RESULTS:  Lab Results   Component Value Date    A1C 5.3 11/15/2019       INR RESULTS:  Lab Results   Component Value Date    INR 1.03 04/11/2011       Efren Vazquez PA-C  March 16, 2021     cc:   Efren Vazquez PA-C  [address] [place] [address]

## 2021-03-16 NOTE — PROGRESS NOTES
Primary Cardiologist: Dr. Hagan    Reason for Visit: One month follow up after increase in metoprolol for ventricular rate control    History of Present Illness:   Rhea is a 89 year old female who was admitted last month with a fib RVR. It was not clear if this was a new onset of chronic as patient has no symptoms. Her atenolol was discontinued and she was started on metoprolol and diltiazem for rate control. She was also started on apixaban 2.5 mg BID for CVA prophylaxis as her CHADSVASC score was calculated to be 6. Echocardiogram showed preserved LVEF. LA was moderately to severely dilated.      She also has history of HFpEF (on furosemide 20 mg), CVA x2, hypertension, and hx of GI bleed (on PPI).     During last visit I recommended we further increase metoprolol to 50 mg BID and have her return in one month with repeat ECG.     Rhea reports that since the increase in metoprolol she has been having nightmares and very vivid dreams.  She wonders if she can switch back to atenolol.  She does check her blood pressure and pulse regularly and her blood pressure is in the 1 teens to 120s over 60s with pulse in the mid 80s to 90s.    Assessment and Plan:  Rhea is a 89 year old female who was admitted last month with a fib RVR. It was not clear if this was a new onset of chronic as patient has no symptoms. Her atenolol was discontinued and she was started on metoprolol and diltiazem for rate control. She was also started on apixaban 2.5 mg BID for CVA prophylaxis as her CHADSVASC score was calculated to be 6. Echocardiogram showed preserved LVEF. LA was moderately to severely dilated.     After some discussion we decided to switch her back to atenolol.  She will start taking 50 mg daily.  Depending on her blood pressure and pulse we may further increase atenolol or consider increasing diltiazem instead.  She will monitor her blood pressure and pulse and contact us in 1 to 2 weeks for an update.  I will see her back in 1  month.  Her ECG shows she continues to be in atrial fibrillation with pulse of 90.    This note was completed in part using Dragon voice recognition software. Although reviewed after completion, some word and grammatical errors may occur.    Orders this Visit:  Orders Placed This Encounter   Procedures     Follow-Up with Cardiac Advanced Practice Provider     EKG 12-lead complete w/read - Clinics (performed today)     Orders Placed This Encounter   Medications     DISCONTD: metoprolol tartrate (LOPRESSOR) 25 MG tablet     Sig: Take 1 tablet (25 mg) by mouth 2 times daily     Dispense:  60 tablet     Refill:  3     atenolol (TENORMIN) 50 MG tablet     Sig: Take 1 tablet (50 mg) by mouth daily     Dispense:  30 tablet     Refill:  3     diltiazem ER COATED BEADS (CARDIZEM CD/CARTIA XT) 180 MG 24 hr capsule     Sig: Take 1 capsule (180 mg) by mouth daily     Dispense:  30 capsule     Refill:  3     Medications Discontinued During This Encounter   Medication Reason     diltiazem ER COATED BEADS (CARDIZEM CD/CARTIA XT) 180 MG 24 hr capsule      metoprolol tartrate (LOPRESSOR) 50 MG tablet      metoprolol tartrate (LOPRESSOR) 25 MG tablet      metoprolol tartrate (LOPRESSOR) 25 MG tablet          Encounter Diagnoses   Name Primary?     Atrial fibrillation, unspecified type (H)      Benign essential hypertension Yes       CURRENT MEDICATIONS:  Current Outpatient Medications   Medication Sig Dispense Refill     apixaban ANTICOAGULANT (ELIQUIS) 2.5 MG tablet Take 1 tablet (2.5 mg) by mouth 2 times daily 60 tablet 0     ascorbic acid (VITAMIN C) 500 MG CPCR CR capsule Take 500 mg by mouth daily Takes in the afternoon.       atenolol (TENORMIN) 50 MG tablet Take 1 tablet (50 mg) by mouth daily 30 tablet 3     atorvastatin (LIPITOR) 10 MG tablet Take 5 mg by mouth daily       calcium carbonate (CALCIUM CARBONATE) 600 MG tablet Take 600 mg by mouth daily Takes in the afternoon       calcium carbonate (TUMS) 500 MG chewable  tablet Take 1-2 chew tab by mouth daily as needed for heartburn       calcium polycarbophil (FIBERCON) 625 MG tablet Take 2 tablets by mouth daily In the afternoon       citalopram (CELEXA) 20 MG tablet Take 20 mg by mouth daily        diltiazem ER COATED BEADS (CARDIZEM CD/CARTIA XT) 180 MG 24 hr capsule Take 1 capsule (180 mg) by mouth daily 30 capsule 3     dimenhyDRINATE 50 MG CHEW Take 50 mg by mouth At Bedtime       furosemide (LASIX) 20 MG tablet Take 2 tablets (40 mg) by mouth every morning 60 tablet 3     glucosamine-chondroitin 500-400 MG CAPS per capsule Take 1 capsule by mouth daily       levothyroxine (LEVOXYL) 100 MCG tablet Take 1 tablet by mouth daily. 90 tablet 3     losartan (COZAAR) 100 MG tablet Take 100 mg by mouth daily        Multiple Vitamins-Minerals (ONE-A-DAY WOMENS 50 PLUS PO) Take 1 tablet by mouth daily In the afternoon       omeprazole (PRILOSEC OTC) 20 MG EC tablet Take 20 mg by mouth every morning (before breakfast)       senna-docusate (SENOKOT-S/PERICOLACE) 8.6-50 MG tablet Take 1 tablet by mouth daily as needed for constipation       Tetrahydrozoline HCl (VISINE OP) Place 1 drop into both eyes daily as needed         ALLERGIES     Allergies   Allergen Reactions     Lisinopril Cough     No Known Drug Allergies        PAST MEDICAL HISTORY:  Past Medical History:   Diagnosis Date     Chronic or unspecified peptic ulcer, unspecified site, with hemorrhage, without mention of obstruction 01/01/1984     Essential hypertension, benign      GLUCOSE INTOLERANCE 11/01/2001     Mitral regurgitation     Mild to moderate, echocardiogram 11/2019     Moderate pulmonary arterial systolic hypertension (H)      Unspecified hypothyroidism        PAST SURGICAL HISTORY:  Past Surgical History:   Procedure Laterality Date     C REMV CATARACT INTRACAP,INSERT LENS  06/01/1989    L cataract extraction     C REMV CATARACT INTRACAP,INSERT LENS  1987,1994    cataract surgeries     C STEREOTACTIC BREAST BIOPSY   98    left breast ster. core needle biopsy     ESOPHAGOSCOPY, GASTROSCOPY, DUODENOSCOPY (EGD), COMBINED N/A 3/18/2016    Procedure: COMBINED ESOPHAGOSCOPY, GASTROSCOPY, DUODENOSCOPY (EGD);  Surgeon: Tray Davis MD;  Location:  GI     ESOPHAGOSCOPY, GASTROSCOPY, DUODENOSCOPY (EGD), COMBINED N/A 3/19/2016    Procedure: COMBINED ESOPHAGOSCOPY, GASTROSCOPY, DUODENOSCOPY (EGD);  Surgeon: Reyna Carrero MD;  Location:  GI     HC BLOOD TRANSFUSION SERVICE      bleeding ulcer requiring transfusion     HC THYROIDECTOMY  1966    throidectomy     HC UPPER GI W/O KUB  2001    Upper GI- hiatal hernia     HERNIA REPAIR         FAMILY HISTORY:  Family History   Problem Relation Age of Onset     Diabetes Mother              Cerebrovascular Disease Father              Cancer Brother          - lung     Hypertension Sister      Hypertension Brother      Breast Cancer Maternal Grandmother         DIABETES MELLITUS       SOCIAL HISTORY:  Social History     Socioeconomic History     Marital status:      Spouse name: None     Number of children: 5     Years of education: 12     Highest education level: None   Occupational History     Occupation: retired    Social Needs     Financial resource strain: None     Food insecurity     Worry: None     Inability: None     Transportation needs     Medical: None     Non-medical: None   Tobacco Use     Smoking status: Never Smoker     Smokeless tobacco: Never Used   Substance and Sexual Activity     Alcohol use: Yes     Comment: occasional wine or mixed drink     Drug use: No     Sexual activity: Never   Lifestyle     Physical activity     Days per week: None     Minutes per session: None     Stress: None   Relationships     Social connections     Talks on phone: None     Gets together: None     Attends Sabianism service: None     Active member of club or organization: None     Attends meetings of clubs or  organizations: None     Relationship status: None     Intimate partner violence     Fear of current or ex partner: None     Emotionally abused: None     Physically abused: None     Forced sexual activity: None   Other Topics Concern     Parent/sibling w/ CABG, MI or angioplasty before 65F 55M? Not Asked   Social History Narrative     None       Review of Systems:  Skin:  Negative     Eyes:  Negative    ENT:  Negative    Respiratory:  Negative    Cardiovascular:    edema;Positive for  Gastroenterology: Negative    Genitourinary:  Negative    Musculoskeletal:  Negative    Neurologic:  Negative    Psychiatric:  Negative    Heme/Lymph/Imm:  Negative    Endocrine:  Positive for thyroid disorder    Physical Exam:  Vitals: /84   Pulse 101      GEN:  NAD  NECK: No JVD  C/V: Irregularly irregular rhythm with normal rate, no murmur, rub or gallop.  RESP: Clear to auscultation bilaterally without wheezing, rales, or rhonchi.  GI: Abdomen soft, nontender, nondistended. No HSM appreciated.   EXTREM: No LE edema.   NEURO: Alert and oriented, cooperative. No obvious focal deficits.   PSYCH: Normal affect.  SKIN: Warm and dry.       Recent Lab Results:  LIPID RESULTS:  Lab Results   Component Value Date    CHOL 155 11/16/2019    HDL 57 11/16/2019    LDL 83 11/16/2019    TRIG 77 11/16/2019    CHOLHDLRATIO 4 02/05/2004       LIVER ENZYME RESULTS:  Lab Results   Component Value Date    AST 48 (H) 01/07/2021    ALT 83 (H) 01/07/2021       CBC RESULTS:  Lab Results   Component Value Date    WBC 7.8 01/06/2021    RBC 3.80 01/06/2021    HGB 11.9 01/06/2021    HCT 37.2 01/06/2021    MCV 98 01/06/2021    MCH 31.3 01/06/2021    MCHC 32.0 01/06/2021    RDW 14.2 01/06/2021     01/06/2021       BMP RESULTS:  Lab Results   Component Value Date     01/09/2021    POTASSIUM 3.6 01/09/2021    CHLORIDE 103 01/09/2021    CO2 30 01/09/2021    ANIONGAP 5 01/09/2021    GLC 90 01/09/2021    BUN 15 01/09/2021    CR 0.86 01/09/2021     GFRESTIMATED 56 (L) 01/09/2021    GFRESTBLACK 65 01/09/2021    GRUPO 9.3 01/09/2021        A1C RESULTS:  Lab Results   Component Value Date    A1C 5.3 11/15/2019       INR RESULTS:  Lab Results   Component Value Date    INR 1.03 04/11/2011           Efren Vazquez PA-C  March 16, 2021

## 2021-03-16 NOTE — PATIENT INSTRUCTIONS
Today's Plan:   1) Stop metoprolol and switch to Atenolol- take one tablet (50 mg) once a day  2) Check Blood pressure and pulse daily for 2-3 weeks. If resting pulse greater than 95, call my clinic.   3) Follow up in one month.     If you have questions or concerns please call my nurse team at [phone].     Scheduling phone number: [phone]  Reminder: Please bring in all current medications, over the counter supplements and vitamin bottles to your next appointment.    It was a pleasure seeing you today!     Efren Vazquez PA-C  3/16/2021

## 2021-03-22 ENCOUNTER — TELEPHONE (OUTPATIENT)
Dept: CARDIOLOGY | Facility: CLINIC | Age: 86
End: 2021-03-22

## 2021-03-22 NOTE — TELEPHONE ENCOUNTER
Message from patient at 12:17 that she was asked by DINESH Cerebrex to call if her heart rates stayed over 90 BPM. She is calling to say her rates are generally over 100, up to 119 today.    Attempted to call the patient back but had to leave a message.    OV 3/16/2021 - patient was changed from metoprolol 50mg BID to atenolol 50mg daily due to nightmares. Patient is in chronic atrial fibrillation.    1537 Spoke with patient, she states she is not having any dyspnea or chest discomfort with the rapid rate. However, she wanted to follow her instructions to call if her rates were consistently over 100 BPM.    Will message DINESH Cerebrex to review      1713 reply from DINESH Cerebrex:  What is her BP? If Systolic greater than 95 on average, increase atenolol to 50 mg BID. We can do phone/video visit in 2 weeks. I see she is scheduled for 4/29 but that would be too far out since we are making med changes. Are her nightmares better?     Efren     Attempted to contact patient, left a message regarding the plan to increase her atenolol in case she wants to take a 2nd dose tonight. Asked patient to call back in the AM.

## 2021-03-23 NOTE — TELEPHONE ENCOUNTER
Voicemail received from patient calling to confirm that she got the message to increase atenolol to 50mg BID, and that she will take the second dose so long as her SBP = >95. No callback needed per patient.     Message to scheduling to move up follow up visit from 4/29 per Efren

## 2021-03-29 ENCOUNTER — TELEPHONE (OUTPATIENT)
Dept: CARDIOLOGY | Facility: CLINIC | Age: 86
End: 2021-03-29

## 2021-03-29 DIAGNOSIS — I48.91 ATRIAL FIBRILLATION, UNSPECIFIED TYPE (H): ICD-10-CM

## 2021-03-29 RX ORDER — ATENOLOL 50 MG/1
100 TABLET ORAL 2 TIMES DAILY
Qty: 30 TABLET | Refills: 3 | COMMUNITY
Start: 2021-03-29 | End: 2021-04-19

## 2021-03-29 RX ORDER — ATENOLOL 50 MG/1
50 TABLET ORAL 2 TIMES DAILY
Qty: 30 TABLET | Refills: 3 | COMMUNITY
Start: 2021-03-29 | End: 2021-03-29

## 2021-03-29 NOTE — TELEPHONE ENCOUNTER
Efren Vazquez PA-C Hiljus, Audrey G, RN   Caller: Unspecified (Today,  1:33 PM)             Increase it further to 100 mg BID and do follow up next week with an DINESH with repeat ECG beforehand.     Efren        Called patient and reviewed instructions as above per Efren. Pt verbalized understanding, agreeable to plan. She states she has enough pills to last for now. Message to scheduling to arrange.

## 2021-03-29 NOTE — TELEPHONE ENCOUNTER
"Voicemail received from patient calling to report HR >95bpm, requesting callback to discuss.     Spoke to patient, states she has increased atenolol to 50mg BID as instructed on 3/22 but her HR is still elevated. She states her HR has been in the 100-110's, fastest 123bpm. 's/80's. Pt denies any shortness of breath, dizziness, palpitations or feeling like her heart is racing but she is \"extremely tired.\" She is on eliquis 2.5mg BID. Routed to [facility] for review.    "

## 2021-03-30 ENCOUNTER — TELEPHONE (OUTPATIENT)
Dept: CARDIOLOGY | Facility: CLINIC | Age: 86
End: 2021-03-30

## 2021-03-30 NOTE — TELEPHONE ENCOUNTER
Message sent to scheduling Patient needs any DINESH visit with EKG next week and to please keep 4-15-21 appointment with Aybike at this time.

## 2021-03-30 NOTE — TELEPHONE ENCOUNTER
----- Message from Vanessa Neal sent at 3/30/2021  2:32 PM CDT -----  Regarding: Review Orders  Patient has a video appt with Aykalyan on 4/15 but looks like new orders were placed for another OV with Aybike and EKG.    Should scheduling contact the patient to reschedule for an in-clinic appt with EKG?    Thanks,  Vanessa

## 2021-04-08 DIAGNOSIS — I48.91 ATRIAL FIBRILLATION, UNSPECIFIED TYPE (H): ICD-10-CM

## 2021-04-15 ENCOUNTER — VIRTUAL VISIT (OUTPATIENT)
Dept: CARDIOLOGY | Facility: CLINIC | Age: 86
End: 2021-04-15
Attending: PHYSICIAN ASSISTANT
Payer: COMMERCIAL

## 2021-04-15 DIAGNOSIS — I48.91 ATRIAL FIBRILLATION, UNSPECIFIED TYPE (H): Primary | ICD-10-CM

## 2021-04-15 DIAGNOSIS — I10 ESSENTIAL HYPERTENSION, BENIGN: ICD-10-CM

## 2021-04-15 DIAGNOSIS — I10 BENIGN ESSENTIAL HYPERTENSION: ICD-10-CM

## 2021-04-15 PROCEDURE — 99213 OFFICE O/P EST LOW 20 MIN: CPT | Mod: 95 | Performed by: PHYSICIAN ASSISTANT

## 2021-04-15 RX ORDER — DILTIAZEM HYDROCHLORIDE 240 MG/1
240 CAPSULE, EXTENDED RELEASE ORAL DAILY
Qty: 90 CAPSULE | Refills: 3 | Status: SHIPPED | OUTPATIENT
Start: 2021-04-15

## 2021-04-15 NOTE — LETTER
4/15/2021    Yoan Saldana MD  [address] [place] [id number]    RE: Rhea Abad       Dear Colleague,    I had the pleasure of seeing Rhea Abad in the [facility].    Rhea is a 89 year old who is being evaluated via a billable telephone visit.      What phone number would you like to be contacted at? [phone]  How would you like to obtain your AVS? Mail a copy    Review Of Systems  Skin: NEGATIVE  Eyes:Ears/Nose/Throat: hearing loss wears hearing aides  Respiratory: NEGATIVE  Cardiovascular:fatigue, fast HR. Edema under control  Gastrointestinal: NEGATIVE  Genitourinary:NEGATIVE   Musculoskeletal: NEGATIVE  Neurologic: NEGATIVE  Psychiatric: NEGATIVE  Hematologic/Lymphatic/Immunologic: NEGATIVE  Endocrine:  NEGATIVE  Vitals - Patient Reported  Systolic (Patient Reported): 122  Diastolic (Patient Reported): 82  Weight (Patient Reported): 49.9 kg (110 lb)  Pulse (Patient Reported): 110    Telephone number of patient: [phone]    Kim Rodríguez LPN    -----------------------------------------------------------------------------------------------------------    Primary Cardiologist: Dr. Hagan    Reason for Phone Visit: One month follow up after increasing atenolol    HPI:  Rhea is a 89 year old female who was admitted last month with a fib RVR. It was not clear if this was a new onset of chronic as patient has no symptoms. Her atenolol was discontinued and she was started on metoprolol and diltiazem for rate control. She was also started on apixaban 2.5 mg BID for CVA prophylaxis as her CHADSVASC score was calculated to be 6. Echocardiogram showed preserved LVEF. LA was moderately to severely dilated.      She also has history of HFpEF (on furosemide 20 mg), CVA x2, hypertension, and hx of GI bleed (on PPI).     We had to switch her from metoprolol to atenolol as she had insomnia and nightmares on metoprolol.    Rhea reports  her resting pulse is in the low 100s on average.  Her blood pressure is controlled.  She denies orthopnea, shortness of breath, weight gain, peripheral edema, abdominal distention, or PND.    A/P:   There is a very pleasant 89-year-old female whom I met for post hospital follow-up for further management of her atrial fibrillation.  She had been started on metoprolol as well as Eliquis.  We had to switch her from metoprolol to atenolol as she felt better on this medication.  She was also started on diltiazem.    Her atrial fibrillation ventricular rate control is suboptimal.  I asked her to double her current dose of diltiazem.  I sent her a new prescription for diltiazem at 240 mg daily.  She does not report symptoms to suggest heart failure.  She will continue with her furosemide dose.  In order to minimize hypotension we will have her decrease losartan down to 50 mg daily.  I will follow-up with her again in 2 to 3 weeks for reevaluation.  I asked that she contact us if she develops weight gain, shortness of breath, orthopnea, or peripheral edema.  She reported that she checks her weight daily and its stable around 110 pounds.    Call Duration: 8 minutes    This visit is being conducted as a virtual visit due to the emphasis on mitigation of the COVID-19 virus pandemic. The clinician has decided that the risk of an in-office visit outweighs the benefit for this patient.     Efren Vazquez PA-C   4/15/2021  Pager: [phone]    cc:   Efren Vazquez PA-C  [address]  [place] [id number]

## 2021-04-15 NOTE — PATIENT INSTRUCTIONS
Today's Plan:   1) Double diltiazem dose. New prescription will be 240 mg daily.   2) Decrease losartan to 50 mg daily to minimize lower blood pressure.   3) Follow up in 3 weeks.   4) Call our clinic if you develop shortness of breath, ankle swelling, or significant weight gain (2 Ibs in 2 days or 5 Ibs in 5 days).    If you have questions or concerns please call my nurse team at [phone].     Scheduling phone number: [phone]  Reminder: Please bring in all current medications, over the counter supplements and vitamin bottles to your next appointment.    It was a pleasure seeing you today!     Efren Vazquez PA-C  4/15/2021

## 2021-04-15 NOTE — PROGRESS NOTES
Rhea is a 89 year old who is being evaluated via a billable telephone visit.      What phone number would you like to be contacted at? [phone]  How would you like to obtain your AVS? Mail a copy    Review Of Systems  Skin: NEGATIVE  Eyes:Ears/Nose/Throat: hearing loss wears hearing aides  Respiratory: NEGATIVE  Cardiovascular:fatigue, fast HR. Edema under control  Gastrointestinal: NEGATIVE  Genitourinary:NEGATIVE   Musculoskeletal: NEGATIVE  Neurologic: NEGATIVE  Psychiatric: NEGATIVE  Hematologic/Lymphatic/Immunologic: NEGATIVE  Endocrine:  NEGATIVE  Vitals - Patient Reported  Systolic (Patient Reported): 122  Diastolic (Patient Reported): 82  Weight (Patient Reported): 49.9 kg (110 lb)  Pulse (Patient Reported): 110    Telephone number of patient: [phone]    Kim Rodríguez LPN    -----------------------------------------------------------------------------------------------------------    Primary Cardiologist: Dr. Hagan    Reason for Phone Visit: One month follow up after increasing atenolol    HPI:  Rhea is a 89 year old female who was admitted last month with a fib RVR. It was not clear if this was a new onset of chronic as patient has no symptoms. Her atenolol was discontinued and she was started on metoprolol and diltiazem for rate control. She was also started on apixaban 2.5 mg BID for CVA prophylaxis as her CHADSVASC score was calculated to be 6. Echocardiogram showed preserved LVEF. LA was moderately to severely dilated.      She also has history of HFpEF (on furosemide 20 mg), CVA x2, hypertension, and hx of GI bleed (on PPI).     We had to switch her from metoprolol to atenolol as she had insomnia and nightmares on metoprolol.    Rhea reports her resting pulse is in the low 100s on average.  Her blood pressure is controlled.  She denies orthopnea, shortness of breath, weight gain, peripheral edema, abdominal distention, or PND.    A/P:   There is a very pleasant 89-year-old female whom I met  for post hospital follow-up for further management of her atrial fibrillation.  She had been started on metoprolol as well as Eliquis.  We had to switch her from metoprolol to atenolol as she felt better on this medication.  She was also started on diltiazem.    Her atrial fibrillation ventricular rate control is suboptimal.  I asked her to double her current dose of diltiazem.  I sent her a new prescription for diltiazem at 240 mg daily.  She does not report symptoms to suggest heart failure.  She will continue with her furosemide dose.  In order to minimize hypotension we will have her decrease losartan down to 50 mg daily.  I will follow-up with her again in 2 to 3 weeks for reevaluation.  I asked that she contact us if she develops weight gain, shortness of breath, orthopnea, or peripheral edema.  She reported that she checks her weight daily and its stable around 110 pounds.    Call Duration: 8 minutes    This visit is being conducted as a virtual visit due to the emphasis on mitigation of the COVID-19 virus pandemic. The clinician has decided that the risk of an in-office visit outweighs the benefit for this patient.     Efren Vazquez PA-C   4/15/2021  Pager: [phone]

## 2021-04-19 DIAGNOSIS — R06.00 DYSPNEA, UNSPECIFIED TYPE: ICD-10-CM

## 2021-04-19 DIAGNOSIS — I10 ESSENTIAL HYPERTENSION, BENIGN: ICD-10-CM

## 2021-04-19 DIAGNOSIS — I48.91 ATRIAL FIBRILLATION, UNSPECIFIED TYPE (H): ICD-10-CM

## 2021-04-19 RX ORDER — FUROSEMIDE 20 MG
40 TABLET ORAL EVERY MORNING
Qty: 180 TABLET | Refills: 3 | Status: SHIPPED | OUTPATIENT
Start: 2021-04-19

## 2021-04-19 RX ORDER — ATENOLOL 50 MG/1
100 TABLET ORAL 2 TIMES DAILY
Qty: 120 TABLET | Refills: 3 | Status: SHIPPED | OUTPATIENT
Start: 2021-04-19 | End: 2021-09-01

## 2021-05-06 ENCOUNTER — OFFICE VISIT (OUTPATIENT)
Dept: CARDIOLOGY | Facility: CLINIC | Age: 86
End: 2021-05-06
Payer: COMMERCIAL

## 2021-05-06 VITALS
HEART RATE: 60 BPM | SYSTOLIC BLOOD PRESSURE: 124 MMHG | HEIGHT: 64 IN | DIASTOLIC BLOOD PRESSURE: 65 MMHG | WEIGHT: 111 LBS | BODY MASS INDEX: 18.95 KG/M2

## 2021-05-06 DIAGNOSIS — R06.00 DYSPNEA, UNSPECIFIED TYPE: Primary | ICD-10-CM

## 2021-05-06 DIAGNOSIS — I10 BENIGN ESSENTIAL HYPERTENSION: ICD-10-CM

## 2021-05-06 DIAGNOSIS — I10 ESSENTIAL HYPERTENSION, BENIGN: ICD-10-CM

## 2021-05-06 DIAGNOSIS — I48.91 ATRIAL FIBRILLATION, UNSPECIFIED TYPE (H): ICD-10-CM

## 2021-05-06 PROCEDURE — 99214 OFFICE O/P EST MOD 30 MIN: CPT | Performed by: PHYSICIAN ASSISTANT

## 2021-05-06 ASSESSMENT — MIFFLIN-ST. JEOR: SCORE: 873.49

## 2021-05-06 NOTE — PATIENT INSTRUCTIONS
Today's Plan:   1) Continue with current medications. No new changes.   2) If your pulse gets too low (in the high 40's) more often, call us and we will decrease atenolol dose.     If you have questions or concerns please call my nurse team at [phone].     Scheduling phone number: [phone]  Reminder: Please bring in all current medications, over the counter supplements and vitamin bottles to your next appointment.    It was a pleasure seeing you today!     Efren Vazquez PA-C  5/6/2021

## 2021-05-06 NOTE — LETTER
5/6/2021    Yoan Saldana MD  [address] [facility] [address]    RE: Rhea Abad       Dear Colleague,    I had the pleasure of seeing Rhea Abad in the [facility].    Primary Cardiologist: Dr. Hagan    Reason for Visit: follow up after increase in diltiazem to 240 mg    History of Present Illness:   Rhea is a 89 year old female who was admitted last month with a fib RVR. It was not clear if this was a new onset of chronic as patient has no symptoms. Her atenolol was discontinued and she was started on metoprolol and diltiazem for rate control. She was also started on apixaban 2.5 mg BID for CVA prophylaxis as her CHADSVASC score was calculated to be 6. Echocardiogram showed preserved LVEF. LA was moderately to severely dilated. She also has history of HFpEF (on furosemide 20 mg), CVA x2, hypertension, and hx of GI bleed (on PPI).     She returns to clinic with her daughter, stating she is doing well. Her pulse is down to 60's and she feels better. She denies lightheadedness, SOB, palpitations, or peripheral edema.     Assessment and Plan:  Rhea is a 89 year old female who was admitted last month with a fib RVR. It was not clear if this was a new onset of chronic as patient has no symptoms. Her atenolol was discontinued and she was started on metoprolol and diltiazem for rate control. She was also started on apixaban 2.5 mg BID for CVA prophylaxis as her CHADSVASC score was calculated to be 6. Echocardiogram showed preserved LVEF. LA was moderately to severely dilated. She also has history of HFpEF (on furosemide 20 mg), CVA x2, hypertension, and hx of GI bleed (on PPI).     She is doing well from cardiac standpoint. We will continue with current medication regimen. She will follow up with us in 6 months with repeat ECG.       This note was completed in part using Dragon voice recognition software. Although reviewed after completion,  some word and grammatical errors may occur.    Orders this Visit:  No orders of the defined types were placed in this encounter.    No orders of the defined types were placed in this encounter.    There are no discontinued medications.      Encounter Diagnoses   Name Primary?     HYPERTENSION      Atrial fibrillation, unspecified type (H)      Benign essential hypertension      Dyspnea, unspecified type Yes       CURRENT MEDICATIONS:  Current Outpatient Medications   Medication Sig Dispense Refill     apixaban ANTICOAGULANT (ELIQUIS) 2.5 MG tablet Take 1 tablet (2.5 mg) by mouth 2 times daily 60 tablet 0     ascorbic acid (VITAMIN C) 500 MG CPCR CR capsule Take 500 mg by mouth daily Takes in the afternoon.       atenolol (TENORMIN) 50 MG tablet Take 2 tablets (100 mg) by mouth 2 times daily 120 tablet 3     atorvastatin (LIPITOR) 10 MG tablet Take 5 mg by mouth daily       calcium carbonate (CALCIUM CARBONATE) 600 MG tablet Take 600 mg by mouth daily Takes in the afternoon       calcium carbonate (TUMS) 500 MG chewable tablet Take 1-2 chew tab by mouth daily as needed for heartburn       calcium polycarbophil (FIBERCON) 625 MG tablet Take 2 tablets by mouth daily In the afternoon       citalopram (CELEXA) 20 MG tablet Take 20 mg by mouth daily        diltiazem ER (DILT-XR) 240 MG 24 hr ER beaded capsule Take 1 capsule (240 mg) by mouth daily 90 capsule 3     dimenhyDRINATE 50 MG CHEW Take 50 mg by mouth At Bedtime       furosemide (LASIX) 20 MG tablet Take 2 tablets (40 mg) by mouth every morning 180 tablet 3     glucosamine-chondroitin 500-400 MG CAPS per capsule Take 1 capsule by mouth daily       levothyroxine (LEVOXYL) 100 MCG tablet Take 1 tablet by mouth daily. 90 tablet 3     losartan (COZAAR) 100 MG tablet Take 50 mg by mouth daily       Multiple Vitamins-Minerals (ONE-A-DAY WOMENS 50 PLUS PO) Take 1 tablet by mouth daily In the afternoon       omeprazole (PRILOSEC OTC) 20 MG EC tablet Take 20 mg by mouth  every morning (before breakfast)       senna-docusate (SENOKOT-S/PERICOLACE) 8.6-50 MG tablet Take 1 tablet by mouth daily as needed for constipation       Tetrahydrozoline HCl (VISINE OP) Place 1 drop into both eyes daily as needed         ALLERGIES     Allergies   Allergen Reactions     Lisinopril Cough     No Known Drug Allergies        PAST MEDICAL HISTORY:  Past Medical History:   Diagnosis Date     Chronic or unspecified peptic ulcer, unspecified site, with hemorrhage, without mention of obstruction 1984     Essential hypertension, benign      GLUCOSE INTOLERANCE 2001     Mitral regurgitation     Mild to moderate, echocardiogram 2019     Moderate pulmonary arterial systolic hypertension (H)      Unspecified hypothyroidism        PAST SURGICAL HISTORY:  Past Surgical History:   Procedure Laterality Date     C REMV CATARACT INTRACAP,INSERT LENS  1989    L cataract extraction     C REMV CATARACT INTRACAP,INSERT LENS  ,    cataract surgeries     C STEREOTACTIC BREAST BIOPSY  98    left breast ster. core needle biopsy     ESOPHAGOSCOPY, GASTROSCOPY, DUODENOSCOPY (EGD), COMBINED N/A 3/18/2016    Procedure: COMBINED ESOPHAGOSCOPY, GASTROSCOPY, DUODENOSCOPY (EGD);  Surgeon: Tray Davis MD;  Location:  GI     ESOPHAGOSCOPY, GASTROSCOPY, DUODENOSCOPY (EGD), COMBINED N/A 3/19/2016    Procedure: COMBINED ESOPHAGOSCOPY, GASTROSCOPY, DUODENOSCOPY (EGD);  Surgeon: eRyna Carrero MD;  Location:  GI     HC BLOOD TRANSFUSION SERVICE  's    bleeding ulcer requiring transfusion     HC THYROIDECTOMY  1966    throidectomy      UPPER GI W/O KUB  2001    Upper GI- hiatal hernia     HERNIA REPAIR         FAMILY HISTORY:  Family History   Problem Relation Age of Onset     Diabetes Mother              Cerebrovascular Disease Father              Cancer Brother          - lung     Hypertension Sister      Hypertension Brother      Breast Cancer  "Maternal Grandmother         DIABETES MELLITUS       SOCIAL HISTORY:  Social History     Socioeconomic History     Marital status:      Spouse name: None     Number of children: 5     Years of education: 12     Highest education level: None   Occupational History     Occupation: retired    Social Needs     Financial resource strain: None     Food insecurity     Worry: None     Inability: None     Transportation needs     Medical: None     Non-medical: None   Tobacco Use     Smoking status: Never Smoker     Smokeless tobacco: Never Used   Substance and Sexual Activity     Alcohol use: Yes     Comment: occasional wine or mixed drink     Drug use: No     Sexual activity: Never   Lifestyle     Physical activity     Days per week: None     Minutes per session: None     Stress: None   Relationships     Social connections     Talks on phone: None     Gets together: None     Attends Jewish service: None     Active member of club or organization: None     Attends meetings of clubs or organizations: None     Relationship status: None     Intimate partner violence     Fear of current or ex partner: None     Emotionally abused: None     Physically abused: None     Forced sexual activity: None   Other Topics Concern     Parent/sibling w/ CABG, MI or angioplasty before 65F 55M? Not Asked   Social History Narrative     None       Review of Systems:  Skin:  Negative     Eyes:  Positive for glasses  ENT:  Negative    Respiratory:  Negative    Cardiovascular:    Positive for;fatigue  Gastroenterology: Negative    Genitourinary:  not assessed    Musculoskeletal:  Negative    Neurologic:  Negative    Psychiatric:  Negative    Heme/Lymph/Imm:  Negative    Endocrine:  Positive for thyroid disorder    Physical Exam:  Vitals: /65   Pulse 60   Ht 1.626 m (5' 4\")   Wt 50.3 kg (111 lb)   BMI 19.05 kg/m       GEN:  NAD  NECK: No JVD  C/V:  Irregularly irregular rhythm with normal rate.   RESP: Clear to " auscultation bilaterally.  GI: Abdomen soft, nontender, nondistended.  EXTREM: No LE edema.   NEURO: Alert and oriented, cooperative. No obvious focal deficits.   PSYCH: Normal affect.  SKIN: Warm and dry.       Recent Lab Results:  LIPID RESULTS:  Lab Results   Component Value Date    CHOL 155 11/16/2019    HDL 57 11/16/2019    LDL 83 11/16/2019    TRIG 77 11/16/2019    CHOLHDLRATIO 4 02/05/2004       LIVER ENZYME RESULTS:  Lab Results   Component Value Date    AST 48 (H) 01/07/2021    ALT 83 (H) 01/07/2021       CBC RESULTS:  Lab Results   Component Value Date    WBC 7.8 01/06/2021    RBC 3.80 01/06/2021    HGB 11.9 01/06/2021    HCT 37.2 01/06/2021    MCV 98 01/06/2021    MCH 31.3 01/06/2021    MCHC 32.0 01/06/2021    RDW 14.2 01/06/2021     01/06/2021       BMP RESULTS:  Lab Results   Component Value Date     01/09/2021    POTASSIUM 3.6 01/09/2021    CHLORIDE 103 01/09/2021    CO2 30 01/09/2021    ANIONGAP 5 01/09/2021    GLC 90 01/09/2021    BUN 15 01/09/2021    CR 0.86 01/09/2021    GFRESTIMATED 56 (L) 01/09/2021    GFRESTBLACK 65 01/09/2021    GRUPO 9.3 01/09/2021        A1C RESULTS:  Lab Results   Component Value Date    A1C 5.3 11/15/2019       INR RESULTS:  Lab Results   Component Value Date    INR 1.03 04/11/2011     Efren Vazquez PA-C  May 6, 2021     cc:   Efren Vazquez PA-C  [address] [id number]

## 2021-05-06 NOTE — PROGRESS NOTES
Primary Cardiologist: Dr. Hagan    Reason for Visit: follow up after increase in diltiazem to 240 mg    History of Present Illness:   Rhea is a 89 year old female who was admitted last month with a fib RVR. It was not clear if this was a new onset of chronic as patient has no symptoms. Her atenolol was discontinued and she was started on metoprolol and diltiazem for rate control. She was also started on apixaban 2.5 mg BID for CVA prophylaxis as her CHADSVASC score was calculated to be 6. Echocardiogram showed preserved LVEF. LA was moderately to severely dilated. She also has history of HFpEF (on furosemide 20 mg), CVA x2, hypertension, and hx of GI bleed (on PPI).     She returns to clinic with her daughter, stating she is doing well. Her pulse is down to 60's and she feels better. She denies lightheadedness, SOB, palpitations, or peripheral edema.     Assessment and Plan:  Rhea is a 89 year old female who was admitted last month with a fib RVR. It was not clear if this was a new onset of chronic as patient has no symptoms. Her atenolol was discontinued and she was started on metoprolol and diltiazem for rate control. She was also started on apixaban 2.5 mg BID for CVA prophylaxis as her CHADSVASC score was calculated to be 6. Echocardiogram showed preserved LVEF. LA was moderately to severely dilated. She also has history of HFpEF (on furosemide 20 mg), CVA x2, hypertension, and hx of GI bleed (on PPI).     She is doing well from cardiac standpoint. We will continue with current medication regimen. She will follow up with us in 6 months with repeat ECG.       This note was completed in part using Dragon voice recognition software. Although reviewed after completion, some word and grammatical errors may occur.    Orders this Visit:  No orders of the defined types were placed in this encounter.    No orders of the defined types were placed in this encounter.    There are no discontinued medications.      Encounter  Diagnoses   Name Primary?     HYPERTENSION      Atrial fibrillation, unspecified type (H)      Benign essential hypertension      Dyspnea, unspecified type Yes       CURRENT MEDICATIONS:  Current Outpatient Medications   Medication Sig Dispense Refill     apixaban ANTICOAGULANT (ELIQUIS) 2.5 MG tablet Take 1 tablet (2.5 mg) by mouth 2 times daily 60 tablet 0     ascorbic acid (VITAMIN C) 500 MG CPCR CR capsule Take 500 mg by mouth daily Takes in the afternoon.       atenolol (TENORMIN) 50 MG tablet Take 2 tablets (100 mg) by mouth 2 times daily 120 tablet 3     atorvastatin (LIPITOR) 10 MG tablet Take 5 mg by mouth daily       calcium carbonate (CALCIUM CARBONATE) 600 MG tablet Take 600 mg by mouth daily Takes in the afternoon       calcium carbonate (TUMS) 500 MG chewable tablet Take 1-2 chew tab by mouth daily as needed for heartburn       calcium polycarbophil (FIBERCON) 625 MG tablet Take 2 tablets by mouth daily In the afternoon       citalopram (CELEXA) 20 MG tablet Take 20 mg by mouth daily        diltiazem ER (DILT-XR) 240 MG 24 hr ER beaded capsule Take 1 capsule (240 mg) by mouth daily 90 capsule 3     dimenhyDRINATE 50 MG CHEW Take 50 mg by mouth At Bedtime       furosemide (LASIX) 20 MG tablet Take 2 tablets (40 mg) by mouth every morning 180 tablet 3     glucosamine-chondroitin 500-400 MG CAPS per capsule Take 1 capsule by mouth daily       levothyroxine (LEVOXYL) 100 MCG tablet Take 1 tablet by mouth daily. 90 tablet 3     losartan (COZAAR) 100 MG tablet Take 50 mg by mouth daily       Multiple Vitamins-Minerals (ONE-A-DAY WOMENS 50 PLUS PO) Take 1 tablet by mouth daily In the afternoon       omeprazole (PRILOSEC OTC) 20 MG EC tablet Take 20 mg by mouth every morning (before breakfast)       senna-docusate (SENOKOT-S/PERICOLACE) 8.6-50 MG tablet Take 1 tablet by mouth daily as needed for constipation       Tetrahydrozoline HCl (VISINE OP) Place 1 drop into both eyes daily as needed         ALLERGIES      Allergies   Allergen Reactions     Lisinopril Cough     No Known Drug Allergies        PAST MEDICAL HISTORY:  Past Medical History:   Diagnosis Date     Chronic or unspecified peptic ulcer, unspecified site, with hemorrhage, without mention of obstruction 1984     Essential hypertension, benign      GLUCOSE INTOLERANCE 2001     Mitral regurgitation     Mild to moderate, echocardiogram 2019     Moderate pulmonary arterial systolic hypertension (H)      Unspecified hypothyroidism        PAST SURGICAL HISTORY:  Past Surgical History:   Procedure Laterality Date     C REMV CATARACT INTRACAP,INSERT LENS  1989    L cataract extraction     C REMV CATARACT INTRACAP,INSERT LENS  ,    cataract surgeries     C STEREOTACTIC BREAST BIOPSY  98    left breast ster. core needle biopsy     ESOPHAGOSCOPY, GASTROSCOPY, DUODENOSCOPY (EGD), COMBINED N/A 3/18/2016    Procedure: COMBINED ESOPHAGOSCOPY, GASTROSCOPY, DUODENOSCOPY (EGD);  Surgeon: Tray Davis MD;  Location:  GI     ESOPHAGOSCOPY, GASTROSCOPY, DUODENOSCOPY (EGD), COMBINED N/A 3/19/2016    Procedure: COMBINED ESOPHAGOSCOPY, GASTROSCOPY, DUODENOSCOPY (EGD);  Surgeon: Reyna Carrero MD;  Location:  GI     HC BLOOD TRANSFUSION SERVICE  's    bleeding ulcer requiring transfusion     HC THYROIDECTOMY  1966    throidectomy     HC UPPER GI W/O KUB  2001    Upper GI- hiatal hernia     HERNIA REPAIR         FAMILY HISTORY:  Family History   Problem Relation Age of Onset     Diabetes Mother              Cerebrovascular Disease Father              Cancer Brother          - lung     Hypertension Sister      Hypertension Brother      Breast Cancer Maternal Grandmother         DIABETES MELLITUS       SOCIAL HISTORY:  Social History     Socioeconomic History     Marital status:      Spouse name: None     Number of children: 5     Years of education: 12     Highest education level: None  "  Occupational History     Occupation: retired    Social Needs     Financial resource strain: None     Food insecurity     Worry: None     Inability: None     Transportation needs     Medical: None     Non-medical: None   Tobacco Use     Smoking status: Never Smoker     Smokeless tobacco: Never Used   Substance and Sexual Activity     Alcohol use: Yes     Comment: occasional wine or mixed drink     Drug use: No     Sexual activity: Never   Lifestyle     Physical activity     Days per week: None     Minutes per session: None     Stress: None   Relationships     Social connections     Talks on phone: None     Gets together: None     Attends [employer] service: None     Active member of club or organization: None     Attends meetings of clubs or organizations: None     Relationship status: None     Intimate partner violence     Fear of current or ex partner: None     Emotionally abused: None     Physically abused: None     Forced sexual activity: None   Other Topics Concern     Parent/sibling w/ CABG, MI or angioplasty before 65F 55M? Not Asked   Social History Narrative     None       Review of Systems:  Skin:  Negative     Eyes:  Positive for glasses  ENT:  Negative    Respiratory:  Negative    Cardiovascular:    Positive for;fatigue  Gastroenterology: Negative    Genitourinary:  not assessed    Musculoskeletal:  Negative    Neurologic:  Negative    Psychiatric:  Negative    Heme/Lymph/Imm:  Negative    Endocrine:  Positive for thyroid disorder    Physical Exam:  Vitals: /65   Pulse 60   Ht 1.626 m (5' 4\")   Wt 50.3 kg (111 lb)   BMI 19.05 kg/m       GEN:  NAD  NECK: No JVD  C/V:  Irregularly irregular rhythm with normal rate.   RESP: Clear to auscultation bilaterally.  GI: Abdomen soft, nontender, nondistended.  EXTREM: No LE edema.   NEURO: Alert and oriented, cooperative. No obvious focal deficits.   PSYCH: Normal affect.  SKIN: Warm and dry.       Recent Lab Results:  LIPID RESULTS:  Lab " Results   Component Value Date    CHOL 155 11/16/2019    HDL 57 11/16/2019    LDL 83 11/16/2019    TRIG 77 11/16/2019    CHOLHDLRATIO 4 02/05/2004       LIVER ENZYME RESULTS:  Lab Results   Component Value Date    AST 48 (H) 01/07/2021    ALT 83 (H) 01/07/2021       CBC RESULTS:  Lab Results   Component Value Date    WBC 7.8 01/06/2021    RBC 3.80 01/06/2021    HGB 11.9 01/06/2021    HCT 37.2 01/06/2021    MCV 98 01/06/2021    MCH 31.3 01/06/2021    MCHC 32.0 01/06/2021    RDW 14.2 01/06/2021     01/06/2021       BMP RESULTS:  Lab Results   Component Value Date     01/09/2021    POTASSIUM 3.6 01/09/2021    CHLORIDE 103 01/09/2021    CO2 30 01/09/2021    ANIONGAP 5 01/09/2021    GLC 90 01/09/2021    BUN 15 01/09/2021    CR 0.86 01/09/2021    GFRESTIMATED 56 (L) 01/09/2021    GFRESTBLACK 65 01/09/2021    GRUPO 9.3 01/09/2021        A1C RESULTS:  Lab Results   Component Value Date    A1C 5.3 11/15/2019       INR RESULTS:  Lab Results   Component Value Date    INR 1.03 04/11/2011           Efren Vazquez PA-C  May 6, 2021

## 2021-09-01 DIAGNOSIS — I48.91 ATRIAL FIBRILLATION, UNSPECIFIED TYPE (H): ICD-10-CM

## 2021-09-01 RX ORDER — ATENOLOL 50 MG/1
100 TABLET ORAL 2 TIMES DAILY
Qty: 360 TABLET | Refills: 3 | Status: SHIPPED | OUTPATIENT
Start: 2021-09-01

## 2023-01-31 ENCOUNTER — TELEPHONE (OUTPATIENT)
Dept: CARDIOLOGY | Facility: CLINIC | Age: 88
End: 2023-01-31
Payer: COMMERCIAL

## 2023-01-31 NOTE — TELEPHONE ENCOUNTER
I returned a fax from [facility] requesting a refill of Atenolol. Patient has not been seen since 5/6/21. Please send to primary provider.